# Patient Record
Sex: FEMALE | Race: ASIAN | NOT HISPANIC OR LATINO | Employment: OTHER | URBAN - METROPOLITAN AREA
[De-identification: names, ages, dates, MRNs, and addresses within clinical notes are randomized per-mention and may not be internally consistent; named-entity substitution may affect disease eponyms.]

---

## 2017-07-21 ENCOUNTER — CONVERSION ENCOUNTER (OUTPATIENT)
Dept: RADIOLOGY | Facility: IMAGING CENTER | Age: 77
End: 2017-07-21

## 2017-11-01 ENCOUNTER — LAB REQUISITION (OUTPATIENT)
Dept: LAB | Facility: HOSPITAL | Age: 77
End: 2017-11-01
Payer: MEDICARE

## 2017-11-01 DIAGNOSIS — K21.9 GASTRO-ESOPHAGEAL REFLUX DISEASE WITHOUT ESOPHAGITIS: ICD-10-CM

## 2017-11-01 DIAGNOSIS — R10.13 EPIGASTRIC PAIN: ICD-10-CM

## 2017-11-01 PROCEDURE — 88342 IMHCHEM/IMCYTCHM 1ST ANTB: CPT | Performed by: INTERNAL MEDICINE

## 2017-11-01 PROCEDURE — 88305 TISSUE EXAM BY PATHOLOGIST: CPT | Performed by: INTERNAL MEDICINE

## 2018-07-11 ENCOUNTER — TRANSCRIBE ORDERS (OUTPATIENT)
Dept: ADMINISTRATIVE | Facility: HOSPITAL | Age: 78
End: 2018-07-11

## 2018-07-11 ENCOUNTER — LAB (OUTPATIENT)
Dept: LAB | Facility: IMAGING CENTER | Age: 78
End: 2018-07-11
Payer: MEDICARE

## 2018-07-11 DIAGNOSIS — E03.8 OTHER SPECIFIED HYPOTHYROIDISM: ICD-10-CM

## 2018-07-11 DIAGNOSIS — E03.8 OTHER SPECIFIED HYPOTHYROIDISM: Primary | ICD-10-CM

## 2018-07-11 LAB
T3FREE SERPL-MCNC: 2.07 PG/ML (ref 2.3–4.2)
T4 FREE SERPL-MCNC: 1.26 NG/DL (ref 0.76–1.46)
TSH SERPL DL<=0.05 MIU/L-ACNC: 1.06 UIU/ML (ref 0.36–3.74)

## 2018-07-11 PROCEDURE — 84439 ASSAY OF FREE THYROXINE: CPT

## 2018-07-11 PROCEDURE — 84481 FREE ASSAY (FT-3): CPT

## 2018-07-11 PROCEDURE — 84443 ASSAY THYROID STIM HORMONE: CPT

## 2018-07-11 PROCEDURE — 36415 COLL VENOUS BLD VENIPUNCTURE: CPT

## 2018-07-23 ENCOUNTER — TRANSCRIBE ORDERS (OUTPATIENT)
Dept: ADMINISTRATIVE | Facility: HOSPITAL | Age: 78
End: 2018-07-23

## 2018-07-23 ENCOUNTER — TELEPHONE (OUTPATIENT)
Dept: FAMILY MEDICINE CLINIC | Facility: CLINIC | Age: 78
End: 2018-07-23

## 2018-07-23 DIAGNOSIS — Z12.31 SCREENING MAMMOGRAM, ENCOUNTER FOR: Primary | ICD-10-CM

## 2018-07-23 DIAGNOSIS — Z12.39 SCREENING BREAST EXAMINATION: Primary | ICD-10-CM

## 2018-07-23 NOTE — TELEPHONE ENCOUNTER
Patient's  called and asked for a script for a mammo for his wife  She has an appt on 7/24/2018 @ 10 am at [facility] in [place]  The patient's  would like it faxed over to [phone]

## 2018-07-24 ENCOUNTER — HOSPITAL ENCOUNTER (OUTPATIENT)
Dept: RADIOLOGY | Facility: IMAGING CENTER | Age: 78
Discharge: HOME | End: 2018-07-24
Payer: MEDICARE

## 2018-07-24 DIAGNOSIS — Z12.39 SCREENING BREAST EXAMINATION: ICD-10-CM

## 2018-07-24 PROCEDURE — 77067 SCR MAMMO BI INCL CAD: CPT

## 2018-08-10 ENCOUNTER — TRANSCRIBE ORDERS (OUTPATIENT)
Dept: ADMINISTRATIVE | Facility: HOSPITAL | Age: 78
End: 2018-08-10

## 2018-08-10 ENCOUNTER — LAB (OUTPATIENT)
Dept: LAB | Facility: IMAGING CENTER | Age: 78
End: 2018-08-10
Payer: MEDICARE

## 2018-08-10 DIAGNOSIS — R05.9 COUGH: ICD-10-CM

## 2018-08-10 DIAGNOSIS — R68.89 OTHER GENERAL SYMPTOMS AND SIGNS: ICD-10-CM

## 2018-08-10 DIAGNOSIS — E03.8 OTHER SPECIFIED HYPOTHYROIDISM: ICD-10-CM

## 2018-08-10 DIAGNOSIS — K21.9 GASTROESOPHAGEAL REFLUX DISEASE WITHOUT ESOPHAGITIS: ICD-10-CM

## 2018-08-10 DIAGNOSIS — D32.9 BENIGN NEOPLASM OF MENINGES, UNSPECIFIED (HCC): Primary | ICD-10-CM

## 2018-08-10 DIAGNOSIS — I10 ESSENTIAL (PRIMARY) HYPERTENSION: ICD-10-CM

## 2018-08-10 DIAGNOSIS — D32.9 BENIGN NEOPLASM OF MENINGES, UNSPECIFIED (HCC): ICD-10-CM

## 2018-08-10 LAB
ALBUMIN SERPL BCP-MCNC: 3.6 G/DL (ref 3.5–5)
ALP SERPL-CCNC: 52 U/L (ref 46–116)
ALT SERPL W P-5'-P-CCNC: 33 U/L (ref 12–78)
ANION GAP SERPL CALCULATED.3IONS-SCNC: 6 MMOL/L (ref 4–13)
AST SERPL W P-5'-P-CCNC: 22 U/L (ref 5–45)
BACTERIA UR QL AUTO: ABNORMAL /HPF
BASOPHILS # BLD AUTO: 0.07 THOUSANDS/ΜL (ref 0–0.1)
BASOPHILS NFR BLD AUTO: 1 % (ref 0–1)
BILIRUB SERPL-MCNC: 0.62 MG/DL (ref 0.2–1)
BILIRUB UR QL STRIP: NEGATIVE
BUN SERPL-MCNC: 17 MG/DL (ref 5–25)
CALCIUM SERPL-MCNC: 8.7 MG/DL (ref 8.3–10.1)
CHLORIDE SERPL-SCNC: 106 MMOL/L (ref 100–108)
CHOLEST SERPL-MCNC: 196 MG/DL (ref 50–200)
CLARITY UR: ABNORMAL
CO2 SERPL-SCNC: 24 MMOL/L (ref 21–32)
COLOR UR: YELLOW
CREAT SERPL-MCNC: 0.65 MG/DL (ref 0.6–1.3)
EOSINOPHIL # BLD AUTO: 0.1 THOUSAND/ΜL (ref 0–0.61)
EOSINOPHIL NFR BLD AUTO: 2 % (ref 0–6)
ERYTHROCYTE [DISTWIDTH] IN BLOOD BY AUTOMATED COUNT: 14.4 % (ref 11.6–15.1)
ERYTHROCYTE [SEDIMENTATION RATE] IN BLOOD: 18 MM/HOUR (ref 0–20)
GFR SERPL CREATININE-BSD FRML MDRD: 87 ML/MIN/1.73SQ M
GLUCOSE P FAST SERPL-MCNC: 88 MG/DL (ref 65–99)
GLUCOSE UR STRIP-MCNC: NEGATIVE MG/DL
HCT VFR BLD AUTO: 39.9 % (ref 34.8–46.1)
HDLC SERPL-MCNC: 56 MG/DL (ref 40–60)
HGB BLD-MCNC: 12.4 G/DL (ref 11.5–15.4)
HGB UR QL STRIP.AUTO: NEGATIVE
HYALINE CASTS #/AREA URNS LPF: ABNORMAL /LPF
IMM GRANULOCYTES # BLD AUTO: 0.01 THOUSAND/UL (ref 0–0.2)
IMM GRANULOCYTES NFR BLD AUTO: 0 % (ref 0–2)
KETONES UR STRIP-MCNC: NEGATIVE MG/DL
LDLC SERPL CALC-MCNC: 114 MG/DL (ref 0–100)
LEUKOCYTE ESTERASE UR QL STRIP: NEGATIVE
LYMPHOCYTES # BLD AUTO: 2.37 THOUSANDS/ΜL (ref 0.6–4.47)
LYMPHOCYTES NFR BLD AUTO: 40 % (ref 14–44)
MAGNESIUM SERPL-MCNC: 2.4 MG/DL (ref 1.6–2.6)
MCH RBC QN AUTO: 28.4 PG (ref 26.8–34.3)
MCHC RBC AUTO-ENTMCNC: 31.1 G/DL (ref 31.4–37.4)
MCV RBC AUTO: 92 FL (ref 82–98)
MONOCYTES # BLD AUTO: 0.55 THOUSAND/ΜL (ref 0.17–1.22)
MONOCYTES NFR BLD AUTO: 9 % (ref 4–12)
NEUTROPHILS # BLD AUTO: 2.88 THOUSANDS/ΜL (ref 1.85–7.62)
NEUTS SEG NFR BLD AUTO: 48 % (ref 43–75)
NITRITE UR QL STRIP: NEGATIVE
NON-SQ EPI CELLS URNS QL MICRO: ABNORMAL /HPF
NONHDLC SERPL-MCNC: 140 MG/DL
NRBC BLD AUTO-RTO: 0 /100 WBCS
PH UR STRIP.AUTO: 6 [PH] (ref 4.5–8)
PLATELET # BLD AUTO: 271 THOUSANDS/UL (ref 149–390)
PMV BLD AUTO: 10.4 FL (ref 8.9–12.7)
POTASSIUM SERPL-SCNC: 3.7 MMOL/L (ref 3.5–5.3)
PROT SERPL-MCNC: 7.5 G/DL (ref 6.4–8.2)
PROT UR STRIP-MCNC: NEGATIVE MG/DL
RBC # BLD AUTO: 4.36 MILLION/UL (ref 3.81–5.12)
RBC #/AREA URNS AUTO: ABNORMAL /HPF
SODIUM SERPL-SCNC: 136 MMOL/L (ref 136–145)
SP GR UR STRIP.AUTO: 1.01 (ref 1–1.03)
T3FREE SERPL-MCNC: 2.41 PG/ML (ref 2.3–4.2)
T4 FREE SERPL-MCNC: 1.37 NG/DL (ref 0.76–1.46)
TRIGL SERPL-MCNC: 131 MG/DL
TSH SERPL DL<=0.05 MIU/L-ACNC: 1.7 UIU/ML (ref 0.36–3.74)
UROBILINOGEN UR QL STRIP.AUTO: 0.2 E.U./DL
WBC # BLD AUTO: 5.98 THOUSAND/UL (ref 4.31–10.16)
WBC #/AREA URNS AUTO: ABNORMAL /HPF

## 2018-08-10 PROCEDURE — 83735 ASSAY OF MAGNESIUM: CPT

## 2018-08-10 PROCEDURE — 85652 RBC SED RATE AUTOMATED: CPT

## 2018-08-10 PROCEDURE — 84439 ASSAY OF FREE THYROXINE: CPT

## 2018-08-10 PROCEDURE — 84443 ASSAY THYROID STIM HORMONE: CPT

## 2018-08-10 PROCEDURE — 36415 COLL VENOUS BLD VENIPUNCTURE: CPT

## 2018-08-10 PROCEDURE — 80061 LIPID PANEL: CPT

## 2018-08-10 PROCEDURE — 80053 COMPREHEN METABOLIC PANEL: CPT

## 2018-08-10 PROCEDURE — 81001 URINALYSIS AUTO W/SCOPE: CPT | Performed by: SPECIALIST

## 2018-08-10 PROCEDURE — 85025 COMPLETE CBC W/AUTO DIFF WBC: CPT

## 2018-08-10 PROCEDURE — 84481 FREE ASSAY (FT-3): CPT

## 2018-08-15 DIAGNOSIS — I10 ESSENTIAL HYPERTENSION: Primary | ICD-10-CM

## 2018-08-15 RX ORDER — LEVOTHYROXINE SODIUM 50 MCG
TABLET ORAL
Qty: 90 TABLET | OUTPATIENT
Start: 2018-08-15

## 2018-08-15 RX ORDER — AMLODIPINE BESYLATE 5 MG/1
TABLET ORAL
Qty: 180 TABLET | Refills: 4 | Status: SHIPPED | OUTPATIENT
Start: 2018-08-15 | End: 2018-09-18 | Stop reason: SDUPTHER

## 2018-09-18 ENCOUNTER — OFFICE VISIT (OUTPATIENT)
Dept: FAMILY MEDICINE CLINIC | Facility: CLINIC | Age: 78
End: 2018-09-18
Payer: MEDICARE

## 2018-09-18 VITALS
BODY MASS INDEX: 24.24 KG/M2 | DIASTOLIC BLOOD PRESSURE: 62 MMHG | SYSTOLIC BLOOD PRESSURE: 118 MMHG | OXYGEN SATURATION: 97 % | HEIGHT: 64 IN | HEART RATE: 57 BPM | RESPIRATION RATE: 16 BRPM | WEIGHT: 142 LBS

## 2018-09-18 DIAGNOSIS — I10 ESSENTIAL HYPERTENSION: ICD-10-CM

## 2018-09-18 DIAGNOSIS — E03.9 HYPOTHYROIDISM, UNSPECIFIED TYPE: Primary | ICD-10-CM

## 2018-09-18 PROCEDURE — 99214 OFFICE O/P EST MOD 30 MIN: CPT | Performed by: SPECIALIST

## 2018-09-18 RX ORDER — LEVOTHYROXINE SODIUM 0.07 MG/1
TABLET ORAL EVERY 24 HOURS
COMMUNITY
Start: 2018-04-16 | End: 2018-09-18

## 2018-09-18 RX ORDER — LEVOTHYROXINE SODIUM 0.05 MG/1
TABLET ORAL
Qty: 104 TABLET | Refills: 3 | Status: SHIPPED | OUTPATIENT
Start: 2018-09-18 | End: 2018-09-18 | Stop reason: SDUPTHER

## 2018-09-18 RX ORDER — LEVOTHYROXINE SODIUM 0.05 MG/1
TABLET ORAL EVERY 24 HOURS
COMMUNITY
Start: 2018-04-16 | End: 2018-09-18 | Stop reason: SDUPTHER

## 2018-09-18 RX ORDER — ESTRADIOL 1 MG/1
TABLET ORAL EVERY 24 HOURS
COMMUNITY
Start: 2014-11-11 | End: 2019-04-08 | Stop reason: ALTCHOICE

## 2018-09-18 RX ORDER — ZOLPIDEM TARTRATE 5 MG/1
5 TABLET ORAL DAILY
COMMUNITY
Start: 2014-12-01 | End: 2019-04-15 | Stop reason: SDUPTHER

## 2018-09-18 RX ORDER — AMLODIPINE BESYLATE 5 MG/1
5 TABLET ORAL 2 TIMES DAILY
Qty: 180 TABLET | Refills: 3 | Status: SHIPPED | OUTPATIENT
Start: 2018-09-18 | End: 2018-09-18 | Stop reason: SDUPTHER

## 2018-09-18 RX ORDER — METRONIDAZOLE 7.5 MG/G
GEL TOPICAL EVERY 12 HOURS
COMMUNITY
Start: 2017-11-06 | End: 2019-04-10 | Stop reason: SDUPTHER

## 2018-09-18 RX ORDER — LEVOTHYROXINE SODIUM 0.05 MG/1
TABLET ORAL
Qty: 104 TABLET | Refills: 0 | Status: SHIPPED | OUTPATIENT
Start: 2018-09-18 | End: 2018-09-26 | Stop reason: SDUPTHER

## 2018-09-18 RX ORDER — AMLODIPINE BESYLATE 5 MG/1
5 TABLET ORAL 2 TIMES DAILY
Qty: 180 TABLET | Refills: 0 | Status: SHIPPED | OUTPATIENT
Start: 2018-09-18 | End: 2018-11-06 | Stop reason: SDUPTHER

## 2018-09-18 NOTE — PROGRESS NOTES
Assessment/Plan:feels  Well  Lab  reviewed         Diagnoses and all orders for this visit:    Hypothyroidism, unspecified type  -     Discontinue: levothyroxine (SYNTHROID) 50 mcg tablet; Take 1 daily and 1 1/2 tablet on Wednesday and Saturday  -     levothyroxine (SYNTHROID) 50 mcg tablet; Take 1 daily and 1 1/2 tablet on Wednesday and Saturday  -     CBC and differential; Future  -     Comprehensive metabolic panel; Future  -     Lipid panel; Future  -     T4, free; Future  -     UA w Reflex to Microscopic w Reflex to Culture -Lab Collect  -     TSH, 3rd generation; Future    Essential hypertension  -     Discontinue: amLODIPine (NORVASC) 5 mg tablet; Take 1 tablet (5 mg total) by mouth 2 (two) times a day  -     amLODIPine (NORVASC) 5 mg tablet; Take 1 tablet (5 mg total) by mouth 2 (two) times a day  -     CBC and differential; Future  -     Comprehensive metabolic panel; Future  -     Lipid panel; Future  -     T4, free; Future  -     UA w Reflex to Microscopic w Reflex to Culture -Lab Collect  -     TSH, 3rd generation; Future    Other orders  -     estradiol (ESTRACE) 1 mg tablet; every 24 hours  -     Discontinue: levothyroxine (SYNTHROID) 50 mcg tablet; every 24 hours  -     Discontinue: levothyroxine (SYNTHROID) 75 mcg tablet; every 24 hours  -     B Complex Vitamins (VITAMIN-B COMPLEX PO); Take 1 tablet by mouth  -     zolpidem (AMBIEN) 5 mg tablet; Take 5 mg by mouth daily  -     metroNIDAZOLE (METROGEL) 0 75 % gel; Every 12 hours          Subjective:      Patient ID: Muna Garza is a 70 y o  female      69 Yo  feels  Well      bp  Controlled   Lab  reviewed        The following portions of the patient's history were reviewed and updated as appropriate: allergies, current medications, past family history, past medical history, past social history, past surgical history and problem list     Review of Systems   Constitutional: Negative for activity change, appetite change, chills, diaphoresis, fatigue, fever and unexpected weight change  HENT: Negative for hearing loss, sinus pain, sinus pressure and voice change  Respiratory: Negative for apnea, cough, choking, chest tightness, shortness of breath and wheezing  Cardiovascular: Negative for chest pain, palpitations and leg swelling  Gastrointestinal: Negative for abdominal distention and abdominal pain  Genitourinary: Negative for dysuria  Musculoskeletal: Negative for arthralgias, back pain, gait problem, joint swelling, myalgias and neck pain  Neurological: Negative for dizziness, tremors, seizures, syncope, facial asymmetry, speech difficulty, weakness, light-headedness, numbness and headaches  Psychiatric/Behavioral: Negative for agitation and behavioral problems  Objective:      /62   Pulse 57   Resp 16   Ht 5' 4" (1 626 m)   Wt 64 4 kg (142 lb)   SpO2 97%   BMI 24 37 kg/m²          Physical Exam   Constitutional: She appears well-developed and well-nourished  No distress  HENT:   Head: Normocephalic  Right Ear: External ear normal    Left Ear: External ear normal    Eyes: Conjunctivae and EOM are normal  Pupils are equal, round, and reactive to light  Right eye exhibits no discharge  Left eye exhibits no discharge  Scleral icterus is present  Cardiovascular: Normal rate, regular rhythm and intact distal pulses  Exam reveals no gallop and no friction rub  No murmur heard  Pulmonary/Chest: No respiratory distress  She has no wheezes  She has no rales  She exhibits no tenderness  Abdominal: She exhibits no distension  There is no tenderness  There is no rebound  Musculoskeletal: She exhibits no edema, tenderness or deformity  Neurological: She is alert  No cranial nerve deficit  Skin: Skin is warm and dry  No rash noted  She is not diaphoretic  No erythema     Psychiatric: Her behavior is normal

## 2018-09-26 DIAGNOSIS — E03.9 HYPOTHYROIDISM, UNSPECIFIED TYPE: ICD-10-CM

## 2018-09-26 RX ORDER — LEVOTHYROXINE SODIUM 0.05 MG/1
TABLET ORAL
Qty: 135 TABLET | Refills: 5 | Status: SHIPPED | OUTPATIENT
Start: 2018-09-26 | End: 2018-11-06 | Stop reason: SDUPTHER

## 2018-09-27 ENCOUNTER — TELEPHONE (OUTPATIENT)
Dept: FAMILY MEDICINE CLINIC | Facility: CLINIC | Age: 78
End: 2018-09-27

## 2018-09-28 ENCOUNTER — TELEPHONE (OUTPATIENT)
Dept: FAMILY MEDICINE CLINIC | Facility: CLINIC | Age: 78
End: 2018-09-28

## 2018-11-06 ENCOUNTER — APPOINTMENT (OUTPATIENT)
Dept: LAB | Facility: HOSPITAL | Age: 78
End: 2018-11-06
Payer: MEDICARE

## 2018-11-06 ENCOUNTER — OFFICE VISIT (OUTPATIENT)
Dept: FAMILY MEDICINE CLINIC | Facility: CLINIC | Age: 78
End: 2018-11-06
Payer: MEDICARE

## 2018-11-06 VITALS
RESPIRATION RATE: 16 BRPM | DIASTOLIC BLOOD PRESSURE: 60 MMHG | OXYGEN SATURATION: 94 % | WEIGHT: 142 LBS | SYSTOLIC BLOOD PRESSURE: 114 MMHG | BODY MASS INDEX: 24.37 KG/M2 | HEART RATE: 67 BPM

## 2018-11-06 DIAGNOSIS — E03.9 ACQUIRED HYPOTHYROIDISM: ICD-10-CM

## 2018-11-06 DIAGNOSIS — I10 ESSENTIAL HYPERTENSION: ICD-10-CM

## 2018-11-06 DIAGNOSIS — E03.9 HYPOTHYROIDISM, UNSPECIFIED TYPE: ICD-10-CM

## 2018-11-06 DIAGNOSIS — E03.9 ACQUIRED HYPOTHYROIDISM: Primary | ICD-10-CM

## 2018-11-06 LAB
T3FREE SERPL-MCNC: 2.02 PG/ML (ref 2.3–4.2)
T4 FREE SERPL-MCNC: 1.22 NG/DL (ref 0.76–1.46)
TSH SERPL DL<=0.05 MIU/L-ACNC: 5.53 UIU/ML (ref 0.47–4.68)

## 2018-11-06 PROCEDURE — 84439 ASSAY OF FREE THYROXINE: CPT

## 2018-11-06 PROCEDURE — 84443 ASSAY THYROID STIM HORMONE: CPT

## 2018-11-06 PROCEDURE — 99212 OFFICE O/P EST SF 10 MIN: CPT | Performed by: SPECIALIST

## 2018-11-06 PROCEDURE — 84481 FREE ASSAY (FT-3): CPT

## 2018-11-06 PROCEDURE — 36415 COLL VENOUS BLD VENIPUNCTURE: CPT

## 2018-11-06 RX ORDER — LEVOTHYROXINE SODIUM 50 MCG
TABLET ORAL
Qty: 103 TABLET | Refills: 3 | Status: SHIPPED | OUTPATIENT
Start: 2018-11-06 | End: 2019-04-10 | Stop reason: SDUPTHER

## 2018-11-06 RX ORDER — AMLODIPINE BESYLATE 5 MG/1
TABLET ORAL
Qty: 180 TABLET | Refills: 3 | Status: SHIPPED | OUTPATIENT
Start: 2018-11-06 | End: 2019-04-10 | Stop reason: SDUPTHER

## 2018-11-06 NOTE — PROGRESS NOTES
Assessment/Plan:      Feels  That  He  She  Is  Hypothyroid   Dry  Skin  Constipation  Feels  Cold          Diagnoses and all orders for this visit:    Acquired hypothyroidism  -     TSH, 3rd generation; Future  -     T4, free; Future  -     T3, free; Future          Subjective:      Patient ID: Rhonda Dupree is a 70 y o  female  69 Yo  Feel  Tired  Constipated   Dry  Skin    Feels  Cold     Check  Thyroid   parameters        The following portions of the patient's history were reviewed and updated as appropriate: allergies, current medications, past family history, past medical history, past social history, past surgical history and problem list     Review of Systems   Constitutional: Positive for fatigue  Negative for activity change, appetite change, chills, diaphoresis, fever and unexpected weight change  HENT: Negative for ear pain and voice change  Eyes: Negative for visual disturbance  Respiratory: Negative for cough, chest tightness, shortness of breath and wheezing  Cardiovascular: Negative for chest pain, palpitations and leg swelling  Gastrointestinal: Positive for constipation  Negative for abdominal distention and abdominal pain  Genitourinary: Negative for difficulty urinating  Musculoskeletal: Negative for arthralgias, back pain, gait problem, joint swelling and myalgias  Skin: Negative for color change, pallor, rash and wound  Dry  skin   Neurological: Negative for dizziness, tremors, seizures, syncope, facial asymmetry, speech difficulty, weakness, light-headedness, numbness and headaches  Objective:      /60 (BP Location: Left arm, Patient Position: Sitting, Cuff Size: Adult)   Pulse 67   Resp 16   Wt 64 4 kg (142 lb)   SpO2 94%   BMI 24 37 kg/m²          Physical Exam   Constitutional: She appears well-developed and well-nourished  No distress  Eyes: Pupils are equal, round, and reactive to light   Conjunctivae and EOM are normal    Neck: No JVD present  Cardiovascular: Normal rate and regular rhythm  No murmur heard  Pulmonary/Chest: She has no wheezes  She has no rales  Musculoskeletal: She exhibits no edema  Skin: Skin is dry  She is not diaphoretic

## 2018-11-07 ENCOUNTER — TELEPHONE (OUTPATIENT)
Dept: FAMILY MEDICINE CLINIC | Facility: CLINIC | Age: 78
End: 2018-11-07

## 2018-11-07 NOTE — TELEPHONE ENCOUNTER
Tess Martin said he had a missed call  Patient was seen 11/6/2018 in office and was sent for labs  Please advise

## 2018-11-08 NOTE — TELEPHONE ENCOUNTER
I did not call this Pt   I will forward this message to Lauryn Wheeler to see if she contacted this Pt

## 2019-01-17 ENCOUNTER — APPOINTMENT (OUTPATIENT)
Dept: LAB | Facility: CLINIC | Age: 79
End: 2019-01-17
Payer: MEDICARE

## 2019-01-17 LAB
T4 FREE SERPL-MCNC: 1.33 NG/DL (ref 0.76–1.46)
TSH SERPL DL<=0.05 MIU/L-ACNC: 0.53 UIU/ML (ref 0.36–3.74)

## 2019-01-17 PROCEDURE — 84439 ASSAY OF FREE THYROXINE: CPT

## 2019-01-17 PROCEDURE — 84443 ASSAY THYROID STIM HORMONE: CPT

## 2019-01-17 PROCEDURE — 36415 COLL VENOUS BLD VENIPUNCTURE: CPT

## 2019-01-22 ENCOUNTER — TELEPHONE (OUTPATIENT)
Dept: FAMILY MEDICINE CLINIC | Facility: CLINIC | Age: 79
End: 2019-01-22

## 2019-01-22 NOTE — TELEPHONE ENCOUNTER
Pts  called and states T3 was not done with last blood work   He would like you to place order so he can take her today to get this done

## 2019-03-28 ENCOUNTER — TRANSCRIBE ORDERS (OUTPATIENT)
Dept: LAB | Facility: CLINIC | Age: 79
End: 2019-03-28

## 2019-03-28 ENCOUNTER — APPOINTMENT (OUTPATIENT)
Dept: LAB | Facility: CLINIC | Age: 79
End: 2019-03-28
Payer: MEDICARE

## 2019-03-28 DIAGNOSIS — I10 HYPERTENSION, UNSPECIFIED TYPE: ICD-10-CM

## 2019-03-28 DIAGNOSIS — E03.9 HYPOTHYROIDISM (ACQUIRED): ICD-10-CM

## 2019-03-28 DIAGNOSIS — E03.9 HYPOTHYROIDISM (ACQUIRED): Primary | ICD-10-CM

## 2019-03-28 LAB
ALBUMIN SERPL BCP-MCNC: 3.5 G/DL (ref 3.5–5)
ALP SERPL-CCNC: 55 U/L (ref 46–116)
ALT SERPL W P-5'-P-CCNC: 38 U/L (ref 12–78)
ANION GAP SERPL CALCULATED.3IONS-SCNC: 10 MMOL/L (ref 4–13)
AST SERPL W P-5'-P-CCNC: 23 U/L (ref 5–45)
BACTERIA UR QL AUTO: ABNORMAL /HPF
BASOPHILS # BLD AUTO: 0.06 THOUSANDS/ΜL (ref 0–0.1)
BASOPHILS NFR BLD AUTO: 1 % (ref 0–1)
BILIRUB SERPL-MCNC: 0.2 MG/DL (ref 0.2–1)
BILIRUB UR QL STRIP: NEGATIVE
BUN SERPL-MCNC: 18 MG/DL (ref 5–25)
CALCIUM SERPL-MCNC: 8.8 MG/DL (ref 8.3–10.1)
CHLORIDE SERPL-SCNC: 105 MMOL/L (ref 100–108)
CHOLEST SERPL-MCNC: 198 MG/DL (ref 50–200)
CLARITY UR: CLEAR
CO2 SERPL-SCNC: 24 MMOL/L (ref 21–32)
COLOR UR: YELLOW
CREAT SERPL-MCNC: 0.68 MG/DL (ref 0.6–1.3)
EOSINOPHIL # BLD AUTO: 0.12 THOUSAND/ΜL (ref 0–0.61)
EOSINOPHIL NFR BLD AUTO: 2 % (ref 0–6)
ERYTHROCYTE [DISTWIDTH] IN BLOOD BY AUTOMATED COUNT: 14.1 % (ref 11.6–15.1)
GFR SERPL CREATININE-BSD FRML MDRD: 85 ML/MIN/1.73SQ M
GLUCOSE P FAST SERPL-MCNC: 98 MG/DL (ref 65–99)
GLUCOSE UR STRIP-MCNC: NEGATIVE MG/DL
HCT VFR BLD AUTO: 38.9 % (ref 34.8–46.1)
HDLC SERPL-MCNC: 56 MG/DL (ref 40–60)
HGB BLD-MCNC: 12.7 G/DL (ref 11.5–15.4)
HGB UR QL STRIP.AUTO: NEGATIVE
IMM GRANULOCYTES # BLD AUTO: 0.01 THOUSAND/UL (ref 0–0.2)
IMM GRANULOCYTES NFR BLD AUTO: 0 % (ref 0–2)
KETONES UR STRIP-MCNC: NEGATIVE MG/DL
LDLC SERPL CALC-MCNC: 127 MG/DL (ref 0–100)
LEUKOCYTE ESTERASE UR QL STRIP: ABNORMAL
LYMPHOCYTES # BLD AUTO: 2.77 THOUSANDS/ΜL (ref 0.6–4.47)
LYMPHOCYTES NFR BLD AUTO: 48 % (ref 14–44)
MCH RBC QN AUTO: 29.3 PG (ref 26.8–34.3)
MCHC RBC AUTO-ENTMCNC: 32.6 G/DL (ref 31.4–37.4)
MCV RBC AUTO: 90 FL (ref 82–98)
MONOCYTES # BLD AUTO: 0.48 THOUSAND/ΜL (ref 0.17–1.22)
MONOCYTES NFR BLD AUTO: 8 % (ref 4–12)
NEUTROPHILS # BLD AUTO: 2.4 THOUSANDS/ΜL (ref 1.85–7.62)
NEUTS SEG NFR BLD AUTO: 41 % (ref 43–75)
NITRITE UR QL STRIP: NEGATIVE
NON-SQ EPI CELLS URNS QL MICRO: ABNORMAL /HPF
NONHDLC SERPL-MCNC: 142 MG/DL
NRBC BLD AUTO-RTO: 0 /100 WBCS
PH UR STRIP.AUTO: 7 [PH]
PLATELET # BLD AUTO: 239 THOUSANDS/UL (ref 149–390)
PMV BLD AUTO: 10.2 FL (ref 8.9–12.7)
POTASSIUM SERPL-SCNC: 3.7 MMOL/L (ref 3.5–5.3)
PROT SERPL-MCNC: 7.3 G/DL (ref 6.4–8.2)
PROT UR STRIP-MCNC: NEGATIVE MG/DL
RBC # BLD AUTO: 4.34 MILLION/UL (ref 3.81–5.12)
RBC #/AREA URNS AUTO: ABNORMAL /HPF
SODIUM SERPL-SCNC: 139 MMOL/L (ref 136–145)
SP GR UR STRIP.AUTO: 1.01 (ref 1–1.03)
T4 FREE SERPL-MCNC: 1.2 NG/DL (ref 0.76–1.46)
TRIGL SERPL-MCNC: 76 MG/DL
TSH SERPL DL<=0.05 MIU/L-ACNC: 1.27 UIU/ML (ref 0.36–3.74)
UROBILINOGEN UR QL STRIP.AUTO: 0.2 E.U./DL
WBC # BLD AUTO: 5.84 THOUSAND/UL (ref 4.31–10.16)
WBC #/AREA URNS AUTO: ABNORMAL /HPF

## 2019-03-28 PROCEDURE — 36415 COLL VENOUS BLD VENIPUNCTURE: CPT

## 2019-03-28 PROCEDURE — 84443 ASSAY THYROID STIM HORMONE: CPT

## 2019-03-28 PROCEDURE — 80053 COMPREHEN METABOLIC PANEL: CPT

## 2019-03-28 PROCEDURE — 85025 COMPLETE CBC W/AUTO DIFF WBC: CPT

## 2019-03-28 PROCEDURE — 80061 LIPID PANEL: CPT

## 2019-03-28 PROCEDURE — 81001 URINALYSIS AUTO W/SCOPE: CPT | Performed by: SPECIALIST

## 2019-03-28 PROCEDURE — 84439 ASSAY OF FREE THYROXINE: CPT

## 2019-04-08 ENCOUNTER — OFFICE VISIT (OUTPATIENT)
Dept: FAMILY MEDICINE CLINIC | Facility: CLINIC | Age: 79
End: 2019-04-08
Payer: MEDICARE

## 2019-04-08 VITALS
DIASTOLIC BLOOD PRESSURE: 78 MMHG | SYSTOLIC BLOOD PRESSURE: 142 MMHG | HEART RATE: 64 BPM | RESPIRATION RATE: 14 BRPM | OXYGEN SATURATION: 95 % | HEIGHT: 64 IN | TEMPERATURE: 97 F | WEIGHT: 141 LBS | BODY MASS INDEX: 24.07 KG/M2

## 2019-04-08 DIAGNOSIS — E03.9 HYPOTHYROIDISM, UNSPECIFIED TYPE: ICD-10-CM

## 2019-04-08 DIAGNOSIS — G47.00 INSOMNIA, UNSPECIFIED TYPE: ICD-10-CM

## 2019-04-08 DIAGNOSIS — I10 ESSENTIAL HYPERTENSION: Primary | ICD-10-CM

## 2019-04-08 DIAGNOSIS — E03.9 ACQUIRED HYPOTHYROIDISM: ICD-10-CM

## 2019-04-08 DIAGNOSIS — D32.9 BENIGN NEOPLASM OF MENINGES, UNSPECIFIED (HCC): ICD-10-CM

## 2019-04-08 PROCEDURE — 99214 OFFICE O/P EST MOD 30 MIN: CPT | Performed by: SPECIALIST

## 2019-04-10 RX ORDER — LEVOTHYROXINE SODIUM 50 MCG
50 TABLET ORAL DAILY
Qty: 103 TABLET | Refills: 3 | Status: SHIPPED | OUTPATIENT
Start: 2019-04-10 | End: 2019-08-12

## 2019-04-10 RX ORDER — METRONIDAZOLE 7.5 MG/G
GEL TOPICAL 2 TIMES DAILY
Qty: 45 G | Refills: 1 | Status: SHIPPED | OUTPATIENT
Start: 2019-04-10 | End: 2020-02-10 | Stop reason: SDUPTHER

## 2019-04-10 RX ORDER — AMLODIPINE BESYLATE 5 MG/1
5 TABLET ORAL 2 TIMES DAILY
Qty: 180 TABLET | Refills: 3 | Status: SHIPPED | OUTPATIENT
Start: 2019-04-10 | End: 2020-02-10 | Stop reason: SDUPTHER

## 2019-04-15 DIAGNOSIS — G47.00 INSOMNIA, UNSPECIFIED TYPE: Primary | ICD-10-CM

## 2019-04-15 RX ORDER — ZOLPIDEM TARTRATE 5 MG/1
5 TABLET ORAL DAILY
Qty: 30 TABLET | Refills: 1 | Status: CANCELLED | OUTPATIENT
Start: 2019-04-15

## 2019-04-15 RX ORDER — ZOLPIDEM TARTRATE 5 MG/1
5 TABLET ORAL DAILY
Qty: 30 TABLET | Refills: 2 | Status: SHIPPED | OUTPATIENT
Start: 2019-04-15 | End: 2020-02-10 | Stop reason: SDUPTHER

## 2019-05-22 ENCOUNTER — TRANSCRIBE ORDERS (OUTPATIENT)
Dept: ADMINISTRATIVE | Facility: HOSPITAL | Age: 79
End: 2019-05-22

## 2019-05-22 DIAGNOSIS — D32.9 MENINGIOMA (HCC): Primary | ICD-10-CM

## 2019-05-30 ENCOUNTER — HOSPITAL ENCOUNTER (OUTPATIENT)
Dept: MRI IMAGING | Facility: HOSPITAL | Age: 79
Discharge: HOME | End: 2019-05-30
Payer: MEDICARE

## 2019-05-30 DIAGNOSIS — D32.9 MENINGIOMA (HCC): ICD-10-CM

## 2019-05-30 PROCEDURE — A9585 GADOBUTROL INJECTION: HCPCS | Performed by: RADIOLOGY

## 2019-05-30 PROCEDURE — 70553 MRI BRAIN STEM W/O & W/DYE: CPT

## 2019-05-30 RX ADMIN — GADOBUTROL 6 ML: 604.72 INJECTION INTRAVENOUS at 14:30

## 2019-07-29 ENCOUNTER — TELEPHONE (OUTPATIENT)
Dept: FAMILY MEDICINE CLINIC | Facility: CLINIC | Age: 79
End: 2019-07-29

## 2019-07-29 DIAGNOSIS — E03.9 ACQUIRED HYPOTHYROIDISM: ICD-10-CM

## 2019-07-29 DIAGNOSIS — D32.9 BENIGN NEOPLASM OF MENINGES, UNSPECIFIED (HCC): ICD-10-CM

## 2019-07-29 DIAGNOSIS — I10 ESSENTIAL HYPERTENSION: Primary | ICD-10-CM

## 2019-08-05 ENCOUNTER — APPOINTMENT (OUTPATIENT)
Dept: LAB | Facility: CLINIC | Age: 79
End: 2019-08-05
Payer: MEDICARE

## 2019-08-05 DIAGNOSIS — I10 ESSENTIAL HYPERTENSION: ICD-10-CM

## 2019-08-05 DIAGNOSIS — D32.9 BENIGN NEOPLASM OF MENINGES, UNSPECIFIED (HCC): ICD-10-CM

## 2019-08-05 DIAGNOSIS — E03.9 ACQUIRED HYPOTHYROIDISM: ICD-10-CM

## 2019-08-05 LAB
ALBUMIN SERPL BCP-MCNC: 3.5 G/DL (ref 3.5–5)
ALP SERPL-CCNC: 52 U/L (ref 46–116)
ALT SERPL W P-5'-P-CCNC: 26 U/L (ref 12–78)
ANION GAP SERPL CALCULATED.3IONS-SCNC: 10 MMOL/L (ref 4–13)
AST SERPL W P-5'-P-CCNC: 16 U/L (ref 5–45)
BACTERIA UR QL AUTO: ABNORMAL /HPF
BASOPHILS # BLD AUTO: 0.07 THOUSANDS/ΜL (ref 0–0.1)
BASOPHILS NFR BLD AUTO: 1 % (ref 0–1)
BILIRUB SERPL-MCNC: 0.4 MG/DL (ref 0.2–1)
BILIRUB UR QL STRIP: NEGATIVE
BUN SERPL-MCNC: 16 MG/DL (ref 5–25)
CALCIUM SERPL-MCNC: 8.8 MG/DL (ref 8.3–10.1)
CHLORIDE SERPL-SCNC: 106 MMOL/L (ref 100–108)
CHOLEST SERPL-MCNC: 185 MG/DL (ref 50–200)
CLARITY UR: CLEAR
CO2 SERPL-SCNC: 25 MMOL/L (ref 21–32)
COLOR UR: YELLOW
CREAT SERPL-MCNC: 0.7 MG/DL (ref 0.6–1.3)
EOSINOPHIL # BLD AUTO: 0.09 THOUSAND/ΜL (ref 0–0.61)
EOSINOPHIL NFR BLD AUTO: 2 % (ref 0–6)
ERYTHROCYTE [DISTWIDTH] IN BLOOD BY AUTOMATED COUNT: 13.8 % (ref 11.6–15.1)
GFR SERPL CREATININE-BSD FRML MDRD: 84 ML/MIN/1.73SQ M
GLUCOSE P FAST SERPL-MCNC: 94 MG/DL (ref 65–99)
GLUCOSE UR STRIP-MCNC: NEGATIVE MG/DL
HCT VFR BLD AUTO: 40.3 % (ref 34.8–46.1)
HDLC SERPL-MCNC: 48 MG/DL (ref 40–60)
HGB BLD-MCNC: 12.7 G/DL (ref 11.5–15.4)
HGB UR QL STRIP.AUTO: NEGATIVE
IMM GRANULOCYTES # BLD AUTO: 0.01 THOUSAND/UL (ref 0–0.2)
IMM GRANULOCYTES NFR BLD AUTO: 0 % (ref 0–2)
KETONES UR STRIP-MCNC: NEGATIVE MG/DL
LDLC SERPL CALC-MCNC: 106 MG/DL (ref 0–100)
LEUKOCYTE ESTERASE UR QL STRIP: ABNORMAL
LYMPHOCYTES # BLD AUTO: 2.7 THOUSANDS/ΜL (ref 0.6–4.47)
LYMPHOCYTES NFR BLD AUTO: 49 % (ref 14–44)
MCH RBC QN AUTO: 28.9 PG (ref 26.8–34.3)
MCHC RBC AUTO-ENTMCNC: 31.5 G/DL (ref 31.4–37.4)
MCV RBC AUTO: 92 FL (ref 82–98)
MONOCYTES # BLD AUTO: 0.54 THOUSAND/ΜL (ref 0.17–1.22)
MONOCYTES NFR BLD AUTO: 10 % (ref 4–12)
NEUTROPHILS # BLD AUTO: 2.06 THOUSANDS/ΜL (ref 1.85–7.62)
NEUTS SEG NFR BLD AUTO: 38 % (ref 43–75)
NITRITE UR QL STRIP: NEGATIVE
NON-SQ EPI CELLS URNS QL MICRO: ABNORMAL /HPF
NONHDLC SERPL-MCNC: 137 MG/DL
NRBC BLD AUTO-RTO: 0 /100 WBCS
PH UR STRIP.AUTO: 6.5 [PH]
PLATELET # BLD AUTO: 246 THOUSANDS/UL (ref 149–390)
PMV BLD AUTO: 9.9 FL (ref 8.9–12.7)
POTASSIUM SERPL-SCNC: 3.9 MMOL/L (ref 3.5–5.3)
PROT SERPL-MCNC: 7.4 G/DL (ref 6.4–8.2)
PROT UR STRIP-MCNC: NEGATIVE MG/DL
RBC # BLD AUTO: 4.39 MILLION/UL (ref 3.81–5.12)
RBC #/AREA URNS AUTO: ABNORMAL /HPF
SODIUM SERPL-SCNC: 141 MMOL/L (ref 136–145)
SP GR UR STRIP.AUTO: <=1.005 (ref 1–1.03)
T4 FREE SERPL-MCNC: 1.29 NG/DL (ref 0.76–1.46)
TRIGL SERPL-MCNC: 154 MG/DL
TSH SERPL DL<=0.05 MIU/L-ACNC: 0.56 UIU/ML (ref 0.36–3.74)
UROBILINOGEN UR QL STRIP.AUTO: 0.2 E.U./DL
WBC # BLD AUTO: 5.47 THOUSAND/UL (ref 4.31–10.16)
WBC #/AREA URNS AUTO: ABNORMAL /HPF

## 2019-08-05 PROCEDURE — 84439 ASSAY OF FREE THYROXINE: CPT

## 2019-08-05 PROCEDURE — 80061 LIPID PANEL: CPT

## 2019-08-05 PROCEDURE — 36415 COLL VENOUS BLD VENIPUNCTURE: CPT

## 2019-08-05 PROCEDURE — 85025 COMPLETE CBC W/AUTO DIFF WBC: CPT

## 2019-08-05 PROCEDURE — 84443 ASSAY THYROID STIM HORMONE: CPT

## 2019-08-05 PROCEDURE — 80053 COMPREHEN METABOLIC PANEL: CPT

## 2019-08-05 PROCEDURE — 81001 URINALYSIS AUTO W/SCOPE: CPT

## 2019-08-12 ENCOUNTER — OFFICE VISIT (OUTPATIENT)
Dept: FAMILY MEDICINE CLINIC | Facility: CLINIC | Age: 79
End: 2019-08-12
Payer: MEDICARE

## 2019-08-12 VITALS
BODY MASS INDEX: 23.39 KG/M2 | RESPIRATION RATE: 16 BRPM | TEMPERATURE: 97.9 F | OXYGEN SATURATION: 97 % | DIASTOLIC BLOOD PRESSURE: 70 MMHG | HEART RATE: 62 BPM | SYSTOLIC BLOOD PRESSURE: 124 MMHG | WEIGHT: 137 LBS | HEIGHT: 64 IN

## 2019-08-12 DIAGNOSIS — Z12.39 BREAST CANCER SCREENING: ICD-10-CM

## 2019-08-12 DIAGNOSIS — G47.00 INSOMNIA, UNSPECIFIED TYPE: ICD-10-CM

## 2019-08-12 DIAGNOSIS — H25.9 AGE-RELATED CATARACT OF BOTH EYES, UNSPECIFIED AGE-RELATED CATARACT TYPE: ICD-10-CM

## 2019-08-12 DIAGNOSIS — E03.9 ACQUIRED HYPOTHYROIDISM: ICD-10-CM

## 2019-08-12 DIAGNOSIS — Z00.00 MEDICARE ANNUAL WELLNESS VISIT, SUBSEQUENT: Primary | ICD-10-CM

## 2019-08-12 DIAGNOSIS — E03.9 HYPOTHYROIDISM, UNSPECIFIED TYPE: ICD-10-CM

## 2019-08-12 DIAGNOSIS — I10 ESSENTIAL HYPERTENSION: ICD-10-CM

## 2019-08-12 PROCEDURE — G0439 PPPS, SUBSEQ VISIT: HCPCS | Performed by: SPECIALIST

## 2019-08-12 PROCEDURE — 99214 OFFICE O/P EST MOD 30 MIN: CPT | Performed by: SPECIALIST

## 2019-08-12 RX ORDER — LEVOTHYROXINE SODIUM 50 MCG
50 TABLET ORAL DAILY
Qty: 104 TABLET | Refills: 3 | Status: SHIPPED | OUTPATIENT
Start: 2019-08-12 | End: 2020-02-10 | Stop reason: SDUPTHER

## 2019-08-12 NOTE — PROGRESS NOTES
Assessment and Plan:     Problem List Items Addressed This Visit     None      Visit Diagnoses     Medicare annual wellness visit, subsequent    -  Primary    Essential hypertension        Acquired hypothyroidism        Insomnia, unspecified type             History of Present Illness:     Patient presents for Welcome to Medicare visit  Patient Care Team:  Braulio Rogers MD as PCP - General (Internal Medicine)     Review of Systems:     Review of Systems   Gastrointestinal: Negative for bowel incontinence  Psychiatric/Behavioral: The patient is nervous/anxious           Problem List:     Patient Active Problem List   Diagnosis    Benign neoplasm of meninges, unspecified ([address] )      Past Medical and Surgical History:     Past Medical History:   Diagnosis Date    Disease of thyroid gland     GERD (gastroesophageal reflux disease)     Shoulder impingement      Past Surgical History:   Procedure Laterality Date    APPENDECTOMY  1993    FEMUR SURGERY      HYSTERECTOMY  1993      Family History:     Family History   Problem Relation Age of Onset    Breast cancer Family     Colon cancer Family     Stroke Family     Hypertension Family       Social History:     Social History     Tobacco Use   Smoking Status Never Smoker   Smokeless Tobacco Never Used   Tobacco Comment    no passive smoke exposure     Social History     Substance and Sexual Activity   Alcohol Use No     Social History     Substance and Sexual Activity   Drug Use No      Medications and Allergies:     Current Outpatient Medications   Medication Sig Dispense Refill    amLODIPine (NORVASC) 5 mg tablet Take 1 tablet (5 mg total) by mouth 2 (two) times a day 180 tablet 3    B Complex Vitamins (VITAMIN-B COMPLEX PO) Take 1 tablet by mouth      metroNIDAZOLE (METROGEL) 0 75 % gel Apply topically 2 (two) times a day 45 g 1    SYNTHROID 50 MCG tablet Take 1 tablet (50 mcg total) by mouth daily Brand Only 103 tablet 3    zolpidem (AMBIEN) 5 mg tablet Take 1 tablet (5 mg total) by mouth daily 30 tablet 2     No current facility-administered medications for this visit  Allergies   Allergen Reactions    No Active Allergies       Immunizations:     Immunization History   Administered Date(s) Administered    Zoster 11/06/2014      Medicare Screening Tests and Risk Assessments:     Arash Hendrix is here for her Subsequent Wellness visit  Health Risk Assessment:  Patient rates overall health as very good  Patient feels that their physical health rating is Same  Eyesight was rated as Same  Hearing was rated as Same  Patient feels that their emotional and mental health rating is Same  Pain experienced by patient in the last 7 days has been None  Patient states that she has experienced no weight loss or gain in last 6 months  Emotional/Mental Health:  Patient has been feeling nervous/anxious  PHQ-9 Depression Screening:    Frequency of the following problems over the past two weeks:      1  Little interest or pleasure in doing things: 0 - not at all      2  Feeling down, depressed, or hopeless: 0 - not at all  PHQ-2 Score: 0          Broken Bones/Falls: Fall Risk Assessment:    In the past year, patient has experienced: No history of falling in past year          Bladder/Bowel:  Patient has leaked urine accidently in the last six months  Patient reports no loss of bowel control  Immunizations:  Patient has had a flu vaccination within the last year  Patient has received a pneumonia shot  Patient has received a shingles shot  Patient has received tetanus/diphtheria shot  Home Safety:  Patient has trouble with stairs inside or outside of their home  Patient currently reports that there are no safety hazards present in home, working smoke alarms, working carbon monoxide detectors        Preventative Screenings:   Breast cancer screening performed, colon cancer screen completed, cholesterol screen completed, glaucoma eye exam completed, Nutrition:  Current diet: Regular with servings of the following:    Medications:  Patient is not currently taking any over-the-counter supplements  Patient is able to manage medications  Lifestyle Choices:  Patient reports no tobacco use  Patient has not smoked or used tobacco in the past   Patient reports no alcohol use  Patient drives a vehicle  Patient wears seat belt  Activities of Daily Living:  Can get out of bed by his or her self, able to dress self, able to make own meals, able to do own shopping, able to bathe self, can do own laundry/housekeeping, can manage own money, pay bills and track expenses    Previous Hospitalizations:  No hospitalization or ED visit in past 12 months        Advanced Directives:  Patient has decided on a power of   Patient has spoken to designated power of   Patient has completed advanced directive          Preventative Screening/Counseling:      Cardiovascular:      General: Risks and Benefits Discussed and Screening Current      Counseling: Healthy Diet and Healthy Weight          Diabetes:      General: Risks and Benefits Discussed and Screening Current      Counseling: Healthy Diet and Healthy Weight          Colorectal Cancer:      General: Risks and Benefits Discussed and Screening Current      Counseling: high fiber diet          Breast Cancer:      General: Risks and Benefits Discussed and Screening Current          Cervical Cancer:      General: Risks and Benefits Discussed and Screening Current          Osteoporosis:      General: Risks and Benefits Discussed and Screening Current      Counseling: Calcium and Vitamin D Intake          AAA:      General: Risks and Benefits Discussed and Screening Current          Glaucoma:      General: Risks and Benefits Discussed and Screening Current          HIV:      General: Risks and Benefits Discussed and Screening Not Indicated      Counseling: HIV Prevention          Hepatitis C:      General: Risks and Benefits Discussed and Screening Current        Advanced Directives:   Patient has living will for healthcare, has durable POA for healthcare, patient has an advanced directive  Information on ACP and/or AD provided  End of life assessment reviewed with patient  Provider agrees with end of life decisions   Immunizations:  Patient reviewed and up to date          No exam data present     Physical Exam:     There were no vitals taken for this visit      Physical Exam

## 2019-08-12 NOTE — PROGRESS NOTES
Assessment/Plan:    Stable  t4  And  tsh   No  Other  Symptoms   Has   2  Hearing  Aids      For   Eye       And  [place] To   Completed           Patient seen in office today  During the visit I was accompanied by MA while physical exam was completed  No problem-specific Assessment & Plan notes found for this encounter  Problem List Items Addressed This Visit     None      Visit Diagnoses     Essential hypertension    -  Primary    Acquired hypothyroidism        Insomnia, unspecified type                Subjective:     BMI Counseling: There is no height or weight on file to calculate BMI  Discussed the patient's BMI with her  The BMI is above average  BMI counseling and education was provided to the patient  Nutrition recommendations include decreasing overall calorie intake  Depression Screening Follow-up Plan: Patient's depression screening was positive with a PHQ-2 score of   Their PHQ-9 score was   Patient assessed for underlying major depression  They have no active suicidal ideations  Brief counseling provided and recommend additional follow-up/re-evaluation next office visit  Patient ID: Luisa Maciel is a 70 y o  female  81-year-old female is seen today     Annual well visit was reviewed she needs referral for eye doctor and  Mammogram       There have been no cardio or pulmonary symptoms    He feels well    [place]   advanced  Directives  Have  Been  Done      tsh  And  t4          The following portions of the patient's history were reviewed and updated as appropriate: allergies, current medications, past family history, past medical history, past social history, past surgical history and problem list     Review of Systems   Constitutional: Negative for activity change, appetite change, chills, diaphoresis, fatigue and fever  HENT: Negative for congestion, sinus pressure, sinus pain and voice change  Eyes: Negative for visual disturbance     Respiratory: Negative for apnea, shortness of breath and wheezing  Cardiovascular: Negative for chest pain, palpitations and leg swelling  Genitourinary: Negative for difficulty urinating and dysuria  Musculoskeletal: Negative for arthralgias, back pain, gait problem, joint swelling, myalgias, neck pain and neck stiffness  Skin: Negative  Neurological: Negative for dizziness, tremors, seizures, syncope, facial asymmetry, speech difficulty, weakness, light-headedness, numbness and headaches  Psychiatric/Behavioral: Negative for agitation and behavioral problems  Objective: There were no vitals taken for this visit  Physical Exam   Constitutional: She is oriented to person, place, and time  She appears well-developed and well-nourished  No distress  HENT:   Mouth/Throat: No oropharyngeal exudate  Eyes: Right eye exhibits no discharge  Left eye exhibits no discharge  No scleral icterus  Cardiovascular: Normal rate, regular rhythm, normal heart sounds and intact distal pulses  No murmur heard  Pulmonary/Chest: Effort normal and breath sounds normal  She has no wheezes  She has no rales  Abdominal: Soft  Musculoskeletal: She exhibits no edema or deformity  Neurological: She is alert and oriented to person, place, and time  Skin: Skin is warm and dry  She is not diaphoretic  Psychiatric: She has a normal mood and affect

## 2019-11-05 ENCOUNTER — HOSPITAL ENCOUNTER (OUTPATIENT)
Dept: MAMMOGRAPHY | Facility: CLINIC | Age: 79
Discharge: HOME | End: 2019-11-05
Payer: MEDICARE

## 2019-11-05 VITALS — HEIGHT: 64 IN | BODY MASS INDEX: 23.39 KG/M2 | WEIGHT: 137 LBS

## 2019-11-05 DIAGNOSIS — Z12.39 BREAST CANCER SCREENING: ICD-10-CM

## 2019-11-05 PROCEDURE — 77063 BREAST TOMOSYNTHESIS BI: CPT

## 2019-11-05 PROCEDURE — 77067 SCR MAMMO BI INCL CAD: CPT

## 2019-11-06 ENCOUNTER — TELEPHONE (OUTPATIENT)
Dept: FAMILY MEDICINE CLINIC | Facility: CLINIC | Age: 79
End: 2019-11-06

## 2020-01-28 ENCOUNTER — TRANSCRIBE ORDERS (OUTPATIENT)
Dept: LAB | Facility: CLINIC | Age: 80
End: 2020-01-28

## 2020-01-28 ENCOUNTER — APPOINTMENT (OUTPATIENT)
Dept: LAB | Facility: CLINIC | Age: 80
End: 2020-01-28
Payer: MEDICARE

## 2020-01-28 DIAGNOSIS — G47.00 INSOMNIA, UNSPECIFIED TYPE: ICD-10-CM

## 2020-01-28 DIAGNOSIS — E03.9 ACQUIRED HYPOTHYROIDISM: ICD-10-CM

## 2020-01-28 DIAGNOSIS — Z00.00 MEDICARE ANNUAL WELLNESS VISIT, SUBSEQUENT: ICD-10-CM

## 2020-01-28 DIAGNOSIS — I10 ESSENTIAL HYPERTENSION: ICD-10-CM

## 2020-01-28 DIAGNOSIS — E03.9 HYPOTHYROIDISM, UNSPECIFIED TYPE: ICD-10-CM

## 2020-01-28 LAB
ALBUMIN SERPL BCP-MCNC: 3.7 G/DL (ref 3.5–5)
ALP SERPL-CCNC: 51 U/L (ref 46–116)
ALT SERPL W P-5'-P-CCNC: 27 U/L (ref 12–78)
ANION GAP SERPL CALCULATED.3IONS-SCNC: 10 MMOL/L (ref 4–13)
AST SERPL W P-5'-P-CCNC: 12 U/L (ref 5–45)
BACTERIA UR QL AUTO: ABNORMAL /HPF
BASOPHILS # BLD AUTO: 0.06 THOUSANDS/ΜL (ref 0–0.1)
BASOPHILS NFR BLD AUTO: 1 % (ref 0–1)
BILIRUB SERPL-MCNC: 0.37 MG/DL (ref 0.2–1)
BILIRUB UR QL STRIP: NEGATIVE
BUN SERPL-MCNC: 18 MG/DL (ref 5–25)
CALCIUM SERPL-MCNC: 8.7 MG/DL (ref 8.3–10.1)
CHLORIDE SERPL-SCNC: 106 MMOL/L (ref 100–108)
CHOLEST SERPL-MCNC: 217 MG/DL (ref 50–200)
CLARITY UR: CLEAR
CO2 SERPL-SCNC: 26 MMOL/L (ref 21–32)
COLOR UR: YELLOW
CREAT SERPL-MCNC: 0.71 MG/DL (ref 0.6–1.3)
EOSINOPHIL # BLD AUTO: 0.13 THOUSAND/ΜL (ref 0–0.61)
EOSINOPHIL NFR BLD AUTO: 2 % (ref 0–6)
ERYTHROCYTE [DISTWIDTH] IN BLOOD BY AUTOMATED COUNT: 14.4 % (ref 11.6–15.1)
GFR SERPL CREATININE-BSD FRML MDRD: 82 ML/MIN/1.73SQ M
GLUCOSE P FAST SERPL-MCNC: 90 MG/DL (ref 65–99)
GLUCOSE UR STRIP-MCNC: NEGATIVE MG/DL
HCT VFR BLD AUTO: 40.3 % (ref 34.8–46.1)
HDLC SERPL-MCNC: 58 MG/DL
HGB BLD-MCNC: 12.7 G/DL (ref 11.5–15.4)
HGB UR QL STRIP.AUTO: NEGATIVE
IMM GRANULOCYTES # BLD AUTO: 0 THOUSAND/UL (ref 0–0.2)
IMM GRANULOCYTES NFR BLD AUTO: 0 % (ref 0–2)
KETONES UR STRIP-MCNC: NEGATIVE MG/DL
LDLC SERPL CALC-MCNC: 138 MG/DL (ref 0–100)
LEUKOCYTE ESTERASE UR QL STRIP: ABNORMAL
LYMPHOCYTES # BLD AUTO: 3.07 THOUSANDS/ΜL (ref 0.6–4.47)
LYMPHOCYTES NFR BLD AUTO: 48 % (ref 14–44)
MCH RBC QN AUTO: 28.7 PG (ref 26.8–34.3)
MCHC RBC AUTO-ENTMCNC: 31.5 G/DL (ref 31.4–37.4)
MCV RBC AUTO: 91 FL (ref 82–98)
MONOCYTES # BLD AUTO: 0.5 THOUSAND/ΜL (ref 0.17–1.22)
MONOCYTES NFR BLD AUTO: 8 % (ref 4–12)
NEUTROPHILS # BLD AUTO: 2.56 THOUSANDS/ΜL (ref 1.85–7.62)
NEUTS SEG NFR BLD AUTO: 41 % (ref 43–75)
NITRITE UR QL STRIP: POSITIVE
NON-SQ EPI CELLS URNS QL MICRO: ABNORMAL /HPF
NONHDLC SERPL-MCNC: 159 MG/DL
NRBC BLD AUTO-RTO: 0 /100 WBCS
PH UR STRIP.AUTO: 7 [PH]
PLATELET # BLD AUTO: 239 THOUSANDS/UL (ref 149–390)
PMV BLD AUTO: 9.8 FL (ref 8.9–12.7)
POTASSIUM SERPL-SCNC: 3.7 MMOL/L (ref 3.5–5.3)
PROT SERPL-MCNC: 7.6 G/DL (ref 6.4–8.2)
PROT UR STRIP-MCNC: NEGATIVE MG/DL
RBC # BLD AUTO: 4.42 MILLION/UL (ref 3.81–5.12)
RBC #/AREA URNS AUTO: ABNORMAL /HPF
SODIUM SERPL-SCNC: 142 MMOL/L (ref 136–145)
SP GR UR STRIP.AUTO: 1.01 (ref 1–1.03)
T4 FREE SERPL-MCNC: 1.23 NG/DL (ref 0.76–1.46)
TRIGL SERPL-MCNC: 106 MG/DL
TSH SERPL DL<=0.05 MIU/L-ACNC: 0.67 UIU/ML (ref 0.36–3.74)
UROBILINOGEN UR QL STRIP.AUTO: 0.2 E.U./DL
WBC # BLD AUTO: 6.32 THOUSAND/UL (ref 4.31–10.16)
WBC #/AREA URNS AUTO: ABNORMAL /HPF

## 2020-01-28 PROCEDURE — 84443 ASSAY THYROID STIM HORMONE: CPT

## 2020-01-28 PROCEDURE — 85025 COMPLETE CBC W/AUTO DIFF WBC: CPT

## 2020-01-28 PROCEDURE — 36415 COLL VENOUS BLD VENIPUNCTURE: CPT

## 2020-01-28 PROCEDURE — 80061 LIPID PANEL: CPT

## 2020-01-28 PROCEDURE — 84439 ASSAY OF FREE THYROXINE: CPT

## 2020-01-28 PROCEDURE — 81001 URINALYSIS AUTO W/SCOPE: CPT | Performed by: SPECIALIST

## 2020-01-28 PROCEDURE — 80053 COMPREHEN METABOLIC PANEL: CPT

## 2020-02-04 ENCOUNTER — TELEPHONE (OUTPATIENT)
Dept: FAMILY MEDICINE CLINIC | Facility: CLINIC | Age: 80
End: 2020-02-04

## 2020-02-04 ENCOUNTER — APPOINTMENT (OUTPATIENT)
Dept: LAB | Facility: CLINIC | Age: 80
End: 2020-02-04
Payer: MEDICARE

## 2020-02-04 DIAGNOSIS — R30.0 DYSURIA: Primary | ICD-10-CM

## 2020-02-04 LAB
BACTERIA UR QL AUTO: ABNORMAL /HPF
BILIRUB UR QL STRIP: NEGATIVE
CLARITY UR: CLEAR
COLOR UR: YELLOW
GLUCOSE UR STRIP-MCNC: NEGATIVE MG/DL
HGB UR QL STRIP.AUTO: NEGATIVE
KETONES UR STRIP-MCNC: NEGATIVE MG/DL
LEUKOCYTE ESTERASE UR QL STRIP: ABNORMAL
NITRITE UR QL STRIP: POSITIVE
NON-SQ EPI CELLS URNS QL MICRO: ABNORMAL /HPF
PH UR STRIP.AUTO: 7 [PH]
PROT UR STRIP-MCNC: NEGATIVE MG/DL
RBC #/AREA URNS AUTO: ABNORMAL /HPF
SP GR UR STRIP.AUTO: 1.01 (ref 1–1.03)
UROBILINOGEN UR QL STRIP.AUTO: 0.2 E.U./DL
WBC #/AREA URNS AUTO: ABNORMAL /HPF

## 2020-02-04 PROCEDURE — 81001 URINALYSIS AUTO W/SCOPE: CPT | Performed by: SPECIALIST

## 2020-02-04 NOTE — TELEPHONE ENCOUNTER
Let  Dr Partha Montero  Know  I  Ordered  A  Urinalysis    Reflex  To   Culture   Sensitivity  [place]  [facility]              Can  Go  To   Lab   Its  In  The  Computer

## 2020-02-04 NOTE — TELEPHONE ENCOUNTER
Call from pt spouse states that pt is currently having UTI symptoms of urinary frequency and burning  No blood no odor  Spouse Is requesting for Dr Choe to put UA order in and he will take urine specimen in sterile cup to [facility]  Please advise thank you

## 2020-02-10 ENCOUNTER — OFFICE VISIT (OUTPATIENT)
Dept: FAMILY MEDICINE CLINIC | Facility: CLINIC | Age: 80
End: 2020-02-10
Payer: MEDICARE

## 2020-02-10 VITALS
WEIGHT: 138.8 LBS | HEIGHT: 64 IN | OXYGEN SATURATION: 97 % | SYSTOLIC BLOOD PRESSURE: 120 MMHG | BODY MASS INDEX: 23.7 KG/M2 | TEMPERATURE: 95.8 F | DIASTOLIC BLOOD PRESSURE: 72 MMHG | HEART RATE: 65 BPM

## 2020-02-10 DIAGNOSIS — G47.00 INSOMNIA, UNSPECIFIED TYPE: ICD-10-CM

## 2020-02-10 DIAGNOSIS — E03.9 ACQUIRED HYPOTHYROIDISM: ICD-10-CM

## 2020-02-10 DIAGNOSIS — E03.9 HYPOTHYROIDISM, UNSPECIFIED TYPE: ICD-10-CM

## 2020-02-10 DIAGNOSIS — I10 ESSENTIAL HYPERTENSION: Primary | ICD-10-CM

## 2020-02-10 DIAGNOSIS — D32.9 BENIGN NEOPLASM OF MENINGES, UNSPECIFIED (HCC): ICD-10-CM

## 2020-02-10 PROCEDURE — 1160F RVW MEDS BY RX/DR IN RCRD: CPT | Performed by: SPECIALIST

## 2020-02-10 PROCEDURE — 3078F DIAST BP <80 MM HG: CPT | Performed by: SPECIALIST

## 2020-02-10 PROCEDURE — 3008F BODY MASS INDEX DOCD: CPT | Performed by: SPECIALIST

## 2020-02-10 PROCEDURE — 1036F TOBACCO NON-USER: CPT | Performed by: SPECIALIST

## 2020-02-10 PROCEDURE — 99214 OFFICE O/P EST MOD 30 MIN: CPT | Performed by: SPECIALIST

## 2020-02-10 PROCEDURE — 3074F SYST BP LT 130 MM HG: CPT | Performed by: SPECIALIST

## 2020-02-10 RX ORDER — ZOLPIDEM TARTRATE 5 MG/1
5 TABLET ORAL DAILY
Qty: 30 TABLET | Refills: 3 | Status: SHIPPED | OUTPATIENT
Start: 2020-02-10 | End: 2020-09-16 | Stop reason: SDUPTHER

## 2020-02-10 RX ORDER — AMLODIPINE BESYLATE 5 MG/1
5 TABLET ORAL 2 TIMES DAILY
Qty: 180 TABLET | Refills: 1 | Status: SHIPPED | OUTPATIENT
Start: 2020-02-10 | End: 2020-09-11

## 2020-02-10 RX ORDER — METRONIDAZOLE 7.5 MG/G
GEL TOPICAL 2 TIMES DAILY
Qty: 135 G | Refills: 1 | Status: SHIPPED | OUTPATIENT
Start: 2020-02-10 | End: 2021-01-20 | Stop reason: SDUPTHER

## 2020-02-10 RX ORDER — LEVOTHYROXINE SODIUM 50 MCG
TABLET ORAL
Qty: 117 TABLET | Refills: 3 | Status: SHIPPED | OUTPATIENT
Start: 2020-02-10 | End: 2020-11-13

## 2020-02-10 NOTE — PROGRESS NOTES
Assessment/Plan:    Patient seen today she is 68years old her only complaint on walking is right knee pain there is no cardio or pulmonary symptoms she goes to the eye doctor dentist to neurosurgeon who follows her for 3 meningiomas on the brain she does not have chronic headaches but occasional headaches    Laboratory studies reviewed she also gets mammography pelvic exams and colonoscopies when indicated    Stable    Patient seen in office today  During the visit I was accompanied by MA while physical exam was completed  No problem-specific Assessment & Plan notes found for this encounter           Problem List Items Addressed This Visit        Nervous and Auditory    Benign neoplasm of meninges, unspecified (HCC)    Relevant Medications    metroNIDAZOLE (METROGEL) 0 75 % gel    zolpidem (AMBIEN) 5 mg tablet    Other Relevant Orders    CBC and differential    Comprehensive metabolic panel    Lipid panel    UA w Reflex to Microscopic w Reflex to Culture -Lab Collect    TSH, 3rd generation    T4, free      Other Visit Diagnoses     Essential hypertension        Relevant Medications    amLODIPine (NORVASC) 5 mg tablet    metroNIDAZOLE (METROGEL) 0 75 % gel    SYNTHROID 50 MCG tablet    zolpidem (AMBIEN) 5 mg tablet    Other Relevant Orders    CBC and differential    Comprehensive metabolic panel    Lipid panel    UA w Reflex to Microscopic w Reflex to Culture -Lab Collect    TSH, 3rd generation    T4, free    Acquired hypothyroidism        Relevant Medications    metroNIDAZOLE (METROGEL) 0 75 % gel    SYNTHROID 50 MCG tablet    zolpidem (AMBIEN) 5 mg tablet    Other Relevant Orders    CBC and differential    Comprehensive metabolic panel    Lipid panel    UA w Reflex to Microscopic w Reflex to Culture -Lab Collect    TSH, 3rd generation    T4, free    Insomnia, unspecified type        Relevant Medications    metroNIDAZOLE (METROGEL) 0 75 % gel    SYNTHROID 50 MCG tablet    zolpidem (AMBIEN) 5 mg tablet    Other Relevant Orders    CBC and differential    Comprehensive metabolic panel    Lipid panel    UA w Reflex to Microscopic w Reflex to Culture -Lab Collect    TSH, 3rd generation    T4, free    Hypothyroidism, unspecified type        Relevant Medications    metroNIDAZOLE (METROGEL) 0 75 % gel    SYNTHROID 50 MCG tablet    zolpidem (AMBIEN) 5 mg tablet    Other Relevant Orders    CBC and differential    Comprehensive metabolic panel    Lipid panel    UA w Reflex to Microscopic w Reflex to Culture -Lab Collect    TSH, 3rd generation    T4, free    Medicare annual wellness visit, subsequent        Relevant Medications    SYNTHROID 50 MCG tablet    Other Relevant Orders    CBC and differential    Comprehensive metabolic panel    Lipid panel    UA w Reflex to Microscopic w Reflex to Culture -Lab Collect    TSH, 3rd generation    T4, free            Subjective:            Patient ID: Brenna Cabrera is a 68 y o  female  24-year-old female when walking developed right knee pain no cardio or pulmonary symptoms she is being followed by Neurosurgery for meningiomas x3 no surgery has been done she does not have any chronic headaches or symptoms from this    Lab reviewed    She has a history of her chronic urinary tract infections the present time she has asymptomatic bacteriuria      The following portions of the patient's history were reviewed and updated as appropriate: allergies, past family history and past social history  Review of Systems   Constitutional: Negative for activity change, appetite change, chills, diaphoresis, fatigue and fever  HENT: Negative for congestion, sinus pressure, sinus pain and voice change  Eyes: Negative for visual disturbance  Respiratory: Negative for cough, chest tightness, shortness of breath and wheezing  Cardiovascular: Negative for chest pain, palpitations and leg swelling  Gastrointestinal: Negative for abdominal distention and abdominal pain     Genitourinary: Negative for difficulty urinating and dysuria  Musculoskeletal: Positive for arthralgias  Negative for back pain, gait problem, joint swelling, myalgias, neck pain and neck stiffness  Skin: Negative  Neurological: Negative for dizziness, tremors, seizures, syncope, facial asymmetry, speech difficulty, weakness, light-headedness, numbness and headaches  Objective:      /72 (BP Location: Left arm, Patient Position: Sitting, Cuff Size: Standard)   Pulse 65   Temp (!) 95 8 °F (35 4 °C) (Tympanic)   Ht 5' 4" (1 626 m)   Wt 63 kg (138 lb 12 8 oz)   SpO2 97%   BMI 23 82 kg/m²          Physical Exam   Constitutional: She is oriented to person, place, and time  She appears well-developed and well-nourished  No distress  HENT:   Mouth/Throat: No oropharyngeal exudate  Eyes: Pupils are equal, round, and reactive to light  EOM are normal    Neck: No JVD present  Cardiovascular: Normal rate, regular rhythm, normal heart sounds and intact distal pulses  No murmur heard  Pulmonary/Chest: Effort normal and breath sounds normal  No respiratory distress  Abdominal: Soft  Bowel sounds are normal  She exhibits no distension  Musculoskeletal: Normal range of motion  Neurological: She is alert and oriented to person, place, and time  Skin: Skin is warm and dry  She is not diaphoretic  Psychiatric: She has a normal mood and affect

## 2020-06-30 ENCOUNTER — TRANSCRIBE ORDERS (OUTPATIENT)
Dept: ADMINISTRATIVE | Facility: HOSPITAL | Age: 80
End: 2020-06-30

## 2020-06-30 DIAGNOSIS — D32.9 MENINGIOMA (HCC): Primary | ICD-10-CM

## 2020-07-06 ENCOUNTER — HOSPITAL ENCOUNTER (OUTPATIENT)
Dept: MRI IMAGING | Facility: HOSPITAL | Age: 80
Discharge: HOME | End: 2020-07-06
Payer: MEDICARE

## 2020-07-06 DIAGNOSIS — D32.9 MENINGIOMA (HCC): ICD-10-CM

## 2020-07-06 PROCEDURE — A9585 GADOBUTROL INJECTION: HCPCS | Performed by: OPHTHALMOLOGY

## 2020-07-06 PROCEDURE — 70553 MRI BRAIN STEM W/O & W/DYE: CPT

## 2020-07-06 RX ADMIN — GADOBUTROL 6 ML: 604.72 INJECTION INTRAVENOUS at 11:02

## 2020-07-30 ENCOUNTER — APPOINTMENT (OUTPATIENT)
Dept: LAB | Facility: CLINIC | Age: 80
End: 2020-07-30
Payer: MEDICARE

## 2020-07-30 ENCOUNTER — TRANSCRIBE ORDERS (OUTPATIENT)
Dept: LAB | Facility: CLINIC | Age: 80
End: 2020-07-30

## 2020-07-30 DIAGNOSIS — E03.9 ACQUIRED HYPOTHYROIDISM: ICD-10-CM

## 2020-07-30 DIAGNOSIS — G47.00 INSOMNIA, UNSPECIFIED TYPE: ICD-10-CM

## 2020-07-30 DIAGNOSIS — Z79.899 ENCOUNTER FOR LONG-TERM (CURRENT) USE OF OTHER MEDICATIONS: ICD-10-CM

## 2020-07-30 DIAGNOSIS — Z79.899 ENCOUNTER FOR LONG-TERM (CURRENT) USE OF OTHER MEDICATIONS: Primary | ICD-10-CM

## 2020-07-30 DIAGNOSIS — L50.9 URTICARIA, UNSPECIFIED: ICD-10-CM

## 2020-07-30 DIAGNOSIS — I10 ESSENTIAL HYPERTENSION, BENIGN: ICD-10-CM

## 2020-07-30 DIAGNOSIS — E03.9 HYPOTHYROIDISM, UNSPECIFIED TYPE: ICD-10-CM

## 2020-07-30 DIAGNOSIS — I10 ESSENTIAL HYPERTENSION: ICD-10-CM

## 2020-07-30 DIAGNOSIS — L30.8 INTERFACE DERMATITIS: ICD-10-CM

## 2020-07-30 DIAGNOSIS — D32.9 BENIGN NEOPLASM OF MENINGES, UNSPECIFIED (HCC): ICD-10-CM

## 2020-07-30 LAB
ALBUMIN SERPL BCP-MCNC: 3.5 G/DL (ref 3.5–5)
ALP SERPL-CCNC: 55 U/L (ref 46–116)
ALT SERPL W P-5'-P-CCNC: 31 U/L (ref 12–78)
ANION GAP SERPL CALCULATED.3IONS-SCNC: 10 MMOL/L (ref 4–13)
AST SERPL W P-5'-P-CCNC: 18 U/L (ref 5–45)
BACTERIA UR QL AUTO: ABNORMAL /HPF
BASOPHILS # BLD AUTO: 0.04 THOUSANDS/ΜL (ref 0–0.1)
BASOPHILS NFR BLD AUTO: 1 % (ref 0–1)
BILIRUB SERPL-MCNC: 0.24 MG/DL (ref 0.2–1)
BILIRUB UR QL STRIP: NEGATIVE
BUN SERPL-MCNC: 21 MG/DL (ref 5–25)
CALCIUM SERPL-MCNC: 8.4 MG/DL (ref 8.3–10.1)
CHLORIDE SERPL-SCNC: 106 MMOL/L (ref 100–108)
CHOLEST SERPL-MCNC: 197 MG/DL (ref 50–200)
CLARITY UR: ABNORMAL
CO2 SERPL-SCNC: 23 MMOL/L (ref 21–32)
COLOR UR: YELLOW
CREAT SERPL-MCNC: 0.76 MG/DL (ref 0.6–1.3)
EOSINOPHIL # BLD AUTO: 0.03 THOUSAND/ΜL (ref 0–0.61)
EOSINOPHIL NFR BLD AUTO: 0 % (ref 0–6)
ERYTHROCYTE [DISTWIDTH] IN BLOOD BY AUTOMATED COUNT: 13.4 % (ref 11.6–15.1)
GFR SERPL CREATININE-BSD FRML MDRD: 75 ML/MIN/1.73SQ M
GLUCOSE P FAST SERPL-MCNC: 106 MG/DL (ref 65–99)
GLUCOSE UR STRIP-MCNC: NEGATIVE MG/DL
HCT VFR BLD AUTO: 37.4 % (ref 34.8–46.1)
HDLC SERPL-MCNC: 55 MG/DL
HGB BLD-MCNC: 12.1 G/DL (ref 11.5–15.4)
HGB UR QL STRIP.AUTO: NEGATIVE
IMM GRANULOCYTES # BLD AUTO: 0.02 THOUSAND/UL (ref 0–0.2)
IMM GRANULOCYTES NFR BLD AUTO: 0 % (ref 0–2)
KETONES UR STRIP-MCNC: NEGATIVE MG/DL
LDLC SERPL CALC-MCNC: 124 MG/DL (ref 0–100)
LEUKOCYTE ESTERASE UR QL STRIP: ABNORMAL
LYMPHOCYTES # BLD AUTO: 2.2 THOUSANDS/ΜL (ref 0.6–4.47)
LYMPHOCYTES NFR BLD AUTO: 32 % (ref 14–44)
MCH RBC QN AUTO: 29.4 PG (ref 26.8–34.3)
MCHC RBC AUTO-ENTMCNC: 32.4 G/DL (ref 31.4–37.4)
MCV RBC AUTO: 91 FL (ref 82–98)
MONOCYTES # BLD AUTO: 0.45 THOUSAND/ΜL (ref 0.17–1.22)
MONOCYTES NFR BLD AUTO: 7 % (ref 4–12)
NEUTROPHILS # BLD AUTO: 4.19 THOUSANDS/ΜL (ref 1.85–7.62)
NEUTS SEG NFR BLD AUTO: 60 % (ref 43–75)
NITRITE UR QL STRIP: NEGATIVE
NON-SQ EPI CELLS URNS QL MICRO: ABNORMAL /HPF
NONHDLC SERPL-MCNC: 142 MG/DL
NRBC BLD AUTO-RTO: 0 /100 WBCS
PH UR STRIP.AUTO: 7 [PH]
PLATELET # BLD AUTO: 238 THOUSANDS/UL (ref 149–390)
PMV BLD AUTO: 9.7 FL (ref 8.9–12.7)
POTASSIUM SERPL-SCNC: 3.8 MMOL/L (ref 3.5–5.3)
PROT SERPL-MCNC: 7.3 G/DL (ref 6.4–8.2)
PROT UR STRIP-MCNC: NEGATIVE MG/DL
RBC # BLD AUTO: 4.12 MILLION/UL (ref 3.81–5.12)
RBC #/AREA URNS AUTO: ABNORMAL /HPF
SODIUM SERPL-SCNC: 139 MMOL/L (ref 136–145)
SP GR UR STRIP.AUTO: 1.01 (ref 1–1.03)
T4 FREE SERPL-MCNC: 1.38 NG/DL (ref 0.76–1.46)
TRIGL SERPL-MCNC: 89 MG/DL
TSH SERPL DL<=0.05 MIU/L-ACNC: 0.22 UIU/ML (ref 0.36–3.74)
UROBILINOGEN UR QL STRIP.AUTO: 0.2 E.U./DL
WBC # BLD AUTO: 6.93 THOUSAND/UL (ref 4.31–10.16)
WBC #/AREA URNS AUTO: ABNORMAL /HPF

## 2020-07-30 PROCEDURE — 80061 LIPID PANEL: CPT

## 2020-07-30 PROCEDURE — 84439 ASSAY OF FREE THYROXINE: CPT

## 2020-07-30 PROCEDURE — 81001 URINALYSIS AUTO W/SCOPE: CPT | Performed by: SPECIALIST

## 2020-07-30 PROCEDURE — 85025 COMPLETE CBC W/AUTO DIFF WBC: CPT

## 2020-07-30 PROCEDURE — 80053 COMPREHEN METABOLIC PANEL: CPT

## 2020-07-30 PROCEDURE — 84443 ASSAY THYROID STIM HORMONE: CPT

## 2020-07-30 PROCEDURE — 36415 COLL VENOUS BLD VENIPUNCTURE: CPT

## 2020-09-02 ENCOUNTER — OFFICE VISIT (OUTPATIENT)
Dept: FAMILY MEDICINE CLINIC | Facility: CLINIC | Age: 80
End: 2020-09-02
Payer: MEDICARE

## 2020-09-02 VITALS
TEMPERATURE: 97 F | WEIGHT: 137 LBS | DIASTOLIC BLOOD PRESSURE: 80 MMHG | SYSTOLIC BLOOD PRESSURE: 140 MMHG | OXYGEN SATURATION: 97 % | HEART RATE: 60 BPM | RESPIRATION RATE: 18 BRPM | HEIGHT: 64 IN | BODY MASS INDEX: 23.39 KG/M2

## 2020-09-02 DIAGNOSIS — E03.9 ACQUIRED HYPOTHYROIDISM: Primary | ICD-10-CM

## 2020-09-02 PROCEDURE — G0439 PPPS, SUBSEQ VISIT: HCPCS | Performed by: SPECIALIST

## 2020-09-02 PROCEDURE — 99214 OFFICE O/P EST MOD 30 MIN: CPT | Performed by: SPECIALIST

## 2020-09-02 NOTE — PATIENT INSTRUCTIONS
Continue your new dose of thyroid replacement check T4 and TSH to see if the TSH is now back in the normal limits    Follow-up within 6 months with a new physician

## 2020-09-02 NOTE — PROGRESS NOTES
Assessment and Plan:     Problem List Items Addressed This Visit     None           Preventive health issues were discussed with patient, and age appropriate screening tests were ordered as noted in patient's After Visit Summary  Personalized health advice and appropriate referrals for health education or preventive services given if needed, as noted in patient's After Visit Summary       History of Present Illness:     Patient presents for Medicare Annual Wellness visit    Patient Care Team:  Melida Fraga MD as PCP - General (Internal Medicine)     Problem List:     Patient Active Problem List   Diagnosis    Benign neoplasm of meninges, unspecified [facility])      Past Medical and Surgical History:     Past Medical History:   Diagnosis Date    Disease of thyroid gland     GERD (gastroesophageal reflux disease)     Shoulder impingement      Past Surgical History:   Procedure Laterality Date    APPENDECTOMY  1993    FEMUR SURGERY      HYSTERECTOMY  1993    OOPHORECTOMY        Family History:     Family History   Problem Relation Age of Onset   Lourdes Courser Breast cancer Family     Colon cancer Family     Stroke Family     Hypertension Family     Colon cancer Mother 80    Breast cancer Sister 44      Social History:        Social History     Socioeconomic History    Marital status: /Civil Union     Spouse name: None    Number of children: None    Years of education: None    Highest education level: None   Occupational History    Occupation: retired   Social Needs    Financial resource strain: Not hard at all   Jules-Gina insecurity     Worry: Never true     Inability: Never true    Transportation needs     Medical: No     Non-medical: No   Tobacco Use    Smoking status: Never Smoker    Smokeless tobacco: Never Used    Tobacco comment: no passive smoke exposure   Substance and Sexual Activity    Alcohol use: No    Drug use: No    Sexual activity: Not Currently     Partners: Male     Birth control/protection: None     Comment:    Lifestyle    Physical activity     Days per week: 7 days     Minutes per session: 40 min    Stress: Not at all   Relationships    Social connections     Talks on phone: More than three times a week     Gets together: More than three times a week     Attends [employer] service: 1 to 4 times per year     Active member of club or organization: No     Attends meetings of clubs or organizations: Never     Relationship status:     Intimate partner violence     Fear of current or ex partner: No     Emotionally abused: No     Physically abused: No     Forced sexual activity: No   Other Topics Concern    None   Social History Narrative    None      Medications and Allergies:     Current Outpatient Medications   Medication Sig Dispense Refill    amLODIPine (NORVASC) 5 mg tablet Take 1 tablet (5 mg total) by mouth 2 (two) times a day 180 tablet 1    B Complex Vitamins (VITAMIN-B COMPLEX PO) Take 1 tablet by mouth      metroNIDAZOLE (METROGEL) 0 75 % gel Apply topically 2 (two) times a day 135 g 1    SYNTHROID 50 MCG tablet Brand Only Pt is to take 1 1/4 tab daily 117 tablet 3    zolpidem (AMBIEN) 5 mg tablet Take 1 tablet (5 mg total) by mouth daily 30 tablet 3     No current facility-administered medications for this visit  Allergies   Allergen Reactions    No Active Allergies       Immunizations:     Immunization History   Administered Date(s) Administered    Zoster 11/06/2014      Health Maintenance: There are no preventive care reminders to display for this patient        Topic Date Due    DTaP,Tdap,and Td Vaccines (1 - Tdap) 01/21/1961    Pneumococcal Vaccine: 65+ Years (1 of 1 - PPSV23) 01/21/2007    Influenza Vaccine  07/01/2020      Medicare Health Risk Assessment:     /80 (BP Location: Left arm, Patient Position: Sitting, Cuff Size: Adult)   Pulse 60   Temp (!) 97 °F (36 1 °C)   Resp 18   Ht 5' 4" (1 626 m)   Wt 62 1 kg (137 lb) SpO2 97%   BMI 23 52 kg/m²      Kameron Rdz is here for her Subsequent Wellness visit  Health Risk Assessment:   Patient rates overall health as fair  Patient feels that their physical health rating is same  Eyesight was rated as same  Hearing was rated as same  Patient feels that their emotional and mental health rating is same  Pain experienced in the last 7 days has been none  Patient states that she has experienced no weight loss or gain in last 6 months  Depression Screening:   PHQ-2 Score: 0      Fall Risk Screening: In the past year, patient has experienced: no history of falling in past year      Urinary Incontinence Screening:   Patient has not leaked urine accidently in the last six months  Home Safety:  Patient has trouble with stairs inside or outside of their home  Patient has working smoke alarms Home safety hazards include: none  Nutrition:   Current diet is Regular  Medications:   Patient is currently taking over-the-counter supplements  OTC medications include: see medication list  Patient is able to manage medications  Activities of Daily Living (ADLs)/Instrumental Activities of Daily Living (IADLs):   Walk and transfer into and out of bed and chair?: Yes  Dress and groom yourself?: Yes    Bathe or shower yourself?: Yes    Feed yourself? Yes  Do your laundry/housekeeping?: Yes  Manage your money, pay your bills and track your expenses?: Yes  Make your own meals?: Yes    Do your own shopping?: Yes    Previous Hospitalizations:   Any hospitalizations or ED visits within the last 12 months?: No      Advance Care Planning:   Living will: Yes    Durable POA for healthcare:  Yes    Advanced directive: Yes      PREVENTIVE SCREENINGS      Cardiovascular Screening:    General: Screening Current      Diabetes Screening:     General: Screening Current      Breast Cancer Screening:     General: Screening Current      Cervical Cancer Screening:    General: Screening Not Indicated Lung Cancer Screening:     General: Screening Not Indicated      Zaid Burleson MD

## 2020-09-02 NOTE — PROGRESS NOTES
Assessment/Plan:    Patient seen today and generally feeling well she has decreased hearing and 2 hearing aids she walks well and can walk for more than a mi she does not have any cardiopulmonary symptoms    Her TSH is slightly less than normal and she reduced her thyroid replacement slightly T4 and TSH will be repeated to compare and attempt to keep her TSH is in the normal limits as well as the T4        Patient seen in office today  During the visit I was accompanied by MA while physical exam was completed  No problem-specific Assessment & Plan notes found for this encounter  Problem List Items Addressed This Visit     None      Visit Diagnoses     Acquired hypothyroidism    -  Primary    Relevant Orders    T4, free    TSH, 3rd generation            Subjective:            Patient ID: Chinyere Cruz is a 68 y o  female  70-year-old female generally feels well she has hypothyroidism she is on Synthroid replacement therapy her TSH is slightly reduced therefore she did reduce her dose slightly at home and will repeat her T4 and TSH is see she is now normal      The following portions of the patient's history were reviewed and updated as appropriate: allergies, past family history, past social history and past surgical history  Review of Systems   Constitutional: Negative for activity change, appetite change, chills, diaphoresis, fatigue and fever  HENT: Positive for hearing loss  Negative for congestion, sinus pressure, sinus pain and voice change  Eyes: Positive for visual disturbance  Patient will be seeing her eye doctor since her vision is decreased   Respiratory: Negative for cough, chest tightness, shortness of breath and wheezing  Cardiovascular: Negative for chest pain, palpitations and leg swelling  Gastrointestinal: Negative for abdominal distention and abdominal pain  Genitourinary: Negative for difficulty urinating     Musculoskeletal: Negative for arthralgias, back pain, gait problem, joint swelling, myalgias, neck pain and neck stiffness  Skin: Negative  Neurological: Negative for dizziness, tremors, seizures, syncope, facial asymmetry, speech difficulty, weakness, light-headedness, numbness and headaches  Psychiatric/Behavioral: Negative for behavioral problems  Objective:      /80 (BP Location: Left arm, Patient Position: Sitting, Cuff Size: Adult)   Pulse 60   Temp (!) 97 °F (36 1 °C)   Resp 18   Ht 5' 4" (1 626 m)   Wt 62 1 kg (137 lb)   SpO2 97%   BMI 23 52 kg/m²          Physical Exam  Constitutional:       General: She is not in acute distress  Appearance: Normal appearance  She is normal weight  She is not ill-appearing, toxic-appearing or diaphoretic  Eyes:      Pupils: Pupils are equal, round, and reactive to light  Neck:      Musculoskeletal: No neck rigidity  Cardiovascular:      Rate and Rhythm: Normal rate and regular rhythm  Heart sounds: No murmur  Pulmonary:      Effort: Pulmonary effort is normal       Breath sounds: Normal breath sounds  No wheezing or rales  Abdominal:      General: There is no distension  Musculoskeletal:         General: No swelling  Skin:     General: Skin is warm  Neurological:      General: No focal deficit present  Mental Status: She is alert     Psychiatric:         Mood and Affect: Mood normal

## 2020-09-11 ENCOUNTER — APPOINTMENT (OUTPATIENT)
Dept: LAB | Facility: CLINIC | Age: 80
End: 2020-09-11
Payer: MEDICARE

## 2020-09-11 DIAGNOSIS — I10 ESSENTIAL HYPERTENSION: ICD-10-CM

## 2020-09-11 DIAGNOSIS — E03.9 ACQUIRED HYPOTHYROIDISM: ICD-10-CM

## 2020-09-11 LAB
T4 FREE SERPL-MCNC: 1.33 NG/DL (ref 0.76–1.46)
TSH SERPL DL<=0.05 MIU/L-ACNC: 0.69 UIU/ML (ref 0.36–3.74)

## 2020-09-11 PROCEDURE — 84439 ASSAY OF FREE THYROXINE: CPT

## 2020-09-11 PROCEDURE — 84443 ASSAY THYROID STIM HORMONE: CPT

## 2020-09-11 PROCEDURE — 36415 COLL VENOUS BLD VENIPUNCTURE: CPT

## 2020-09-11 RX ORDER — AMLODIPINE BESYLATE 5 MG/1
TABLET ORAL
Qty: 180 TABLET | Refills: 3 | Status: SHIPPED | OUTPATIENT
Start: 2020-09-11 | End: 2021-01-20 | Stop reason: SDUPTHER

## 2020-09-16 DIAGNOSIS — E03.9 HYPOTHYROIDISM, UNSPECIFIED TYPE: ICD-10-CM

## 2020-09-16 DIAGNOSIS — I10 ESSENTIAL HYPERTENSION: ICD-10-CM

## 2020-09-16 DIAGNOSIS — G47.00 INSOMNIA, UNSPECIFIED TYPE: ICD-10-CM

## 2020-09-16 DIAGNOSIS — D32.9 BENIGN NEOPLASM OF MENINGES, UNSPECIFIED (HCC): ICD-10-CM

## 2020-09-16 DIAGNOSIS — E03.9 ACQUIRED HYPOTHYROIDISM: ICD-10-CM

## 2020-09-16 RX ORDER — ZOLPIDEM TARTRATE 5 MG/1
5 TABLET ORAL DAILY
Qty: 30 TABLET | Refills: 3 | Status: SHIPPED | OUTPATIENT
Start: 2020-09-16 | End: 2021-01-20 | Stop reason: SDUPTHER

## 2020-09-16 NOTE — TELEPHONE ENCOUNTER
Patients  is requesting patient to have annual blood work ordered   Patient will have this done by next visit

## 2020-09-21 ENCOUNTER — TELEPHONE (OUTPATIENT)
Dept: FAMILY MEDICINE CLINIC | Facility: CLINIC | Age: 80
End: 2020-09-21

## 2020-09-21 DIAGNOSIS — E78.5 HYPERLIPIDEMIA, UNSPECIFIED HYPERLIPIDEMIA TYPE: ICD-10-CM

## 2020-09-21 DIAGNOSIS — E03.9 ACQUIRED HYPOTHYROIDISM: Primary | ICD-10-CM

## 2020-09-21 NOTE — TELEPHONE ENCOUNTER
Patient's  called and would like to have a script for labs placed so that she can have them done before her next visit

## 2020-11-13 DIAGNOSIS — G47.00 INSOMNIA, UNSPECIFIED TYPE: ICD-10-CM

## 2020-11-13 DIAGNOSIS — I10 ESSENTIAL HYPERTENSION: ICD-10-CM

## 2020-11-13 DIAGNOSIS — E03.9 HYPOTHYROIDISM, UNSPECIFIED TYPE: ICD-10-CM

## 2020-11-13 DIAGNOSIS — E03.9 ACQUIRED HYPOTHYROIDISM: ICD-10-CM

## 2020-11-13 RX ORDER — LEVOTHYROXINE SODIUM 50 MCG
TABLET ORAL
Qty: 113 TABLET | Refills: 3 | Status: SHIPPED | OUTPATIENT
Start: 2020-11-13 | End: 2021-01-20 | Stop reason: SDUPTHER

## 2020-11-30 PROCEDURE — 88305 TISSUE EXAM BY PATHOLOGIST: CPT | Performed by: PATHOLOGY

## 2020-12-01 ENCOUNTER — LAB REQUISITION (OUTPATIENT)
Dept: LAB | Facility: HOSPITAL | Age: 80
End: 2020-12-01
Payer: MEDICARE

## 2020-12-01 DIAGNOSIS — K31.89 OTHER DISEASES OF STOMACH AND DUODENUM: ICD-10-CM

## 2020-12-01 DIAGNOSIS — B96.81 HELICOBACTER PYLORI (H. PYLORI) AS THE CAUSE OF DISEASES CLASSIFIED ELSEWHERE: ICD-10-CM

## 2020-12-01 DIAGNOSIS — K22.89 OTHER SPECIFIED DISEASES OF ESOPHAGUS: ICD-10-CM

## 2020-12-01 DIAGNOSIS — K63.5 POLYP OF COLON: ICD-10-CM

## 2020-12-01 DIAGNOSIS — Z80.0 FAMILY HISTORY OF MALIGNANT NEOPLASM OF DIGESTIVE ORGANS: ICD-10-CM

## 2020-12-01 DIAGNOSIS — Z86.010 PERSONAL HISTORY OF COLONIC POLYPS: ICD-10-CM

## 2020-12-01 DIAGNOSIS — K29.50 UNSPECIFIED CHRONIC GASTRITIS WITHOUT BLEEDING: ICD-10-CM

## 2021-01-20 ENCOUNTER — OFFICE VISIT (OUTPATIENT)
Dept: INTERNAL MEDICINE CLINIC | Facility: CLINIC | Age: 81
End: 2021-01-20
Payer: MEDICARE

## 2021-01-20 VITALS
HEIGHT: 62 IN | HEART RATE: 79 BPM | OXYGEN SATURATION: 98 % | BODY MASS INDEX: 25.21 KG/M2 | DIASTOLIC BLOOD PRESSURE: 72 MMHG | SYSTOLIC BLOOD PRESSURE: 128 MMHG | TEMPERATURE: 96.9 F | WEIGHT: 137 LBS

## 2021-01-20 DIAGNOSIS — Z79.899 ENCOUNTER FOR LONG-TERM CURRENT USE OF MEDICATION: ICD-10-CM

## 2021-01-20 DIAGNOSIS — R39.9 URINARY SYMPTOM OR SIGN: ICD-10-CM

## 2021-01-20 DIAGNOSIS — Z71.9 HEALTH COUNSELING: ICD-10-CM

## 2021-01-20 DIAGNOSIS — Z23 ENCOUNTER FOR IMMUNIZATION: ICD-10-CM

## 2021-01-20 DIAGNOSIS — E03.9 ACQUIRED HYPOTHYROIDISM: Primary | ICD-10-CM

## 2021-01-20 DIAGNOSIS — G47.00 INSOMNIA, UNSPECIFIED TYPE: ICD-10-CM

## 2021-01-20 DIAGNOSIS — R20.9 COLD HANDS: ICD-10-CM

## 2021-01-20 DIAGNOSIS — L71.9 ROSACEA: ICD-10-CM

## 2021-01-20 DIAGNOSIS — D32.9 BENIGN NEOPLASM OF MENINGES, UNSPECIFIED (HCC): ICD-10-CM

## 2021-01-20 DIAGNOSIS — Z78.0 POSTMENOPAUSAL: ICD-10-CM

## 2021-01-20 DIAGNOSIS — G47.09 OTHER INSOMNIA: ICD-10-CM

## 2021-01-20 DIAGNOSIS — I10 ESSENTIAL HYPERTENSION: ICD-10-CM

## 2021-01-20 DIAGNOSIS — R73.01 ABNORMAL FASTING GLUCOSE: ICD-10-CM

## 2021-01-20 DIAGNOSIS — E03.9 HYPOTHYROIDISM, UNSPECIFIED TYPE: ICD-10-CM

## 2021-01-20 DIAGNOSIS — K29.50 CHRONIC GASTRITIS, PRESENCE OF BLEEDING UNSPECIFIED, UNSPECIFIED GASTRITIS TYPE: ICD-10-CM

## 2021-01-20 DIAGNOSIS — D42.9 MENINGIOMA, MULTIPLE (HCC): ICD-10-CM

## 2021-01-20 PROBLEM — K57.30 DIVERTICULAR DISEASE OF COLON: Status: ACTIVE | Noted: 2020-11-19

## 2021-01-20 PROBLEM — B96.81 HELICOBACTER PYLORI GASTRITIS: Status: ACTIVE | Noted: 2017-10-19

## 2021-01-20 PROBLEM — K64.9 HEMORRHOIDS: Status: ACTIVE | Noted: 2020-11-19

## 2021-01-20 PROBLEM — K29.70 HELICOBACTER PYLORI GASTRITIS: Status: ACTIVE | Noted: 2017-10-19

## 2021-01-20 PROCEDURE — 99214 OFFICE O/P EST MOD 30 MIN: CPT | Performed by: INTERNAL MEDICINE

## 2021-01-20 RX ORDER — CALCIUM LACTATE 100 MG
600 TABLET ORAL DAILY PRN
Qty: 30 TABLET | Refills: 0
Start: 2021-01-20

## 2021-01-20 RX ORDER — LEVOTHYROXINE SODIUM 50 MCG
TABLET ORAL
Qty: 113 TABLET | Refills: 0 | Status: SHIPPED | OUTPATIENT
Start: 2021-01-20 | End: 2021-03-24

## 2021-01-20 RX ORDER — METRONIDAZOLE 7.5 MG/G
GEL TOPICAL 2 TIMES DAILY
Qty: 135 G | Refills: 1 | Status: SHIPPED | OUTPATIENT
Start: 2021-01-20 | End: 2022-05-16

## 2021-01-20 RX ORDER — ZOLPIDEM TARTRATE 5 MG/1
5 TABLET ORAL DAILY
Qty: 30 TABLET | Refills: 1 | Status: SHIPPED | OUTPATIENT
Start: 2021-01-20 | End: 2022-02-14 | Stop reason: SDUPTHER

## 2021-01-20 RX ORDER — AMLODIPINE BESYLATE 5 MG/1
5 TABLET ORAL 2 TIMES DAILY
Qty: 180 TABLET | Refills: 1 | Status: SHIPPED | OUTPATIENT
Start: 2021-01-20 | End: 2021-05-26

## 2021-01-20 NOTE — PROGRESS NOTES
Assessment/Plan:    Essential hypertension  BP stable, on amlodipine  Acquired hypothyroidism  Repeat TFTs  Taking medication appropriately  Cold hands  Intermittent symptoms with chills, not consistent with Raynaud's  Refer to Endo  Meningioma, multiple ([facility] [address] )  Yearly MRI, due 7/21  Sees [facility] neurology  Rosacea  Uses metronidazole prn  Other insomnia  Takes zolpidem 2 5 mg qHS  Diagnoses and all orders for this visit:    Acquired hypothyroidism  -     Ambulatory referral to Endocrinology; Future  -     TSH, 3rd generation with Free T4 reflex  -     T4, free  -     Synthroid 50 MCG tablet; TAKE 1 AND 1/4 TABLETS BY  MOUTH DAILY  -     zolpidem (AMBIEN) 5 mg tablet; Take 1 tablet (5 mg total) by mouth daily  -     metroNIDAZOLE (METROGEL) 0 75 % gel; Apply topically 2 (two) times a day    Cold hands  -     Ambulatory referral to Endocrinology; Future    Essential hypertension  -     CBC and differential  -     Comprehensive metabolic panel  -     Lipid panel  -     Synthroid 50 MCG tablet; TAKE 1 AND 1/4 TABLETS BY  MOUTH DAILY  -     amLODIPine (NORVASC) 5 mg tablet; Take 1 tablet (5 mg total) by mouth 2 (two) times a day  -     zolpidem (AMBIEN) 5 mg tablet; Take 1 tablet (5 mg total) by mouth daily  -     metroNIDAZOLE (METROGEL) 0 75 % gel; Apply topically 2 (two) times a day    Hypothyroidism, unspecified type  -     Synthroid 50 MCG tablet; TAKE 1 AND 1/4 TABLETS BY  MOUTH DAILY  -     zolpidem (AMBIEN) 5 mg tablet; Take 1 tablet (5 mg total) by mouth daily  -     metroNIDAZOLE (METROGEL) 0 75 % gel; Apply topically 2 (two) times a day    Insomnia, unspecified type  -     Synthroid 50 MCG tablet; TAKE 1 AND 1/4 TABLETS BY  MOUTH DAILY  -     zolpidem (AMBIEN) 5 mg tablet; Take 1 tablet (5 mg total) by mouth daily  -     metroNIDAZOLE (METROGEL) 0 75 % gel; Apply topically 2 (two) times a day    Benign neoplasm of meninges, unspecified (HCC)  -     zolpidem (AMBIEN) 5 mg tablet;  Take 1 tablet (5 mg total) by mouth daily  -     metroNIDAZOLE (METROGEL) 0 75 % gel; Apply topically 2 (two) times a day    Meningioma, multiple (HCC)    Other insomnia    Abnormal fasting glucose  -     Hemoglobin A1C    Encounter for long-term current use of medication  -     Vitamin B12    Postmenopausal  -     DXA bone density spine hip and pelvis; Future    Urinary symptom or sign  -     UA w Reflex to Microscopic w Reflex to Culture    Rosacea    Health counseling  Comments:  Declined vaccinations due to side effects in the past   Bone density due  Declined mammogram this year  Chronic gastritis, presence of bleeding unspecified, unspecified gastritis type  -     Betaine HCl 300 MG TABS; Take 2 tablets (600 mg total) by mouth daily as needed (indigestion)  -     pancrelipase, Lip-Prot-Amyl, (CREON) 24,000 units; Take 24,000 units of lipase by mouth daily as needed for snacks    Other orders  -     Cancel: Vitamin D 25 hydroxy      Follow up in 6 months or as needed  Subjective:      Patient ID: Charanjit Mancilla is a 68 y o  female here to establish care  She feels well, no new complaints  She reports developing cold hands after she eats  This would subside after a few minutes  For several months (?years), she has experienced intermittent chills  It would occur anytime of the year, would wear a winter coat in the summer sometimes  Symptoms would last less than a day, no other symptoms   is asking if this is related to her thyroid  Denies any dry skin, constipations, memory issues  She takes Ambien 2 5 mg every night to help her sleep  The following portions of the patient's history were reviewed and updated as appropriate: allergies, current medications, past family history, past medical history, past social history, past surgical history and problem list     Review of Systems   Constitutional: Negative for appetite change and fatigue     HENT: Negative for congestion, ear pain and postnasal drip     Eyes: Negative for visual disturbance  Respiratory: Negative for cough and shortness of breath  Cardiovascular: Negative for chest pain and leg swelling  Gastrointestinal: Negative for abdominal pain, constipation and diarrhea  Genitourinary: Negative for dysuria, frequency and urgency  Musculoskeletal: Negative for arthralgias and myalgias  Skin: Negative for rash and wound  Neurological: Negative for dizziness, numbness and headaches  Hematological: Does not bruise/bleed easily  Psychiatric/Behavioral: Negative for confusion  The patient is not nervous/anxious  Objective:      /72   Pulse 79   Temp (!) 96 9 °F (36 1 °C)   Ht 5' 2" (1 575 m)   Wt 62 1 kg (137 lb)   SpO2 98%   BMI 25 06 kg/m²          Physical Exam  Vitals signs and nursing note reviewed  Constitutional:       General: She is not in acute distress  Appearance: She is well-developed  HENT:      Head: Normocephalic and atraumatic  Right Ear: Tympanic membrane, ear canal and external ear normal       Left Ear: Tympanic membrane, ear canal and external ear normal    Eyes:      Pupils: Pupils are equal, round, and reactive to light  Cardiovascular:      Rate and Rhythm: Normal rate and regular rhythm  Heart sounds: Normal heart sounds  Pulmonary:      Effort: Pulmonary effort is normal       Breath sounds: Normal breath sounds  No wheezing  Abdominal:      General: Bowel sounds are normal       Palpations: Abdomen is soft  Skin:     General: Skin is warm  Findings: No rash  Neurological:      General: No focal deficit present  Mental Status: She is alert and oriented to person, place, and time  Psychiatric:         Mood and Affect: Mood normal          Behavior: Behavior normal          Reviewed available records  BMI Counseling: Body mass index is 25 06 kg/m²   The BMI is above normal  Exercise recommendations include exercising 3-5 times per week and strength training exercises

## 2021-01-25 ENCOUNTER — IMMUNIZATIONS (OUTPATIENT)
Dept: FAMILY MEDICINE CLINIC | Facility: HOSPITAL | Age: 81
End: 2021-01-25

## 2021-01-25 DIAGNOSIS — Z23 ENCOUNTER FOR IMMUNIZATION: Primary | ICD-10-CM

## 2021-01-25 PROCEDURE — 0011A SARS-COV-2 / COVID-19 MRNA VACCINE (MODERNA) 100 MCG: CPT

## 2021-01-25 PROCEDURE — 91301 SARS-COV-2 / COVID-19 MRNA VACCINE (MODERNA) 100 MCG: CPT

## 2021-02-22 ENCOUNTER — IMMUNIZATIONS (OUTPATIENT)
Dept: FAMILY MEDICINE CLINIC | Facility: HOSPITAL | Age: 81
End: 2021-02-22

## 2021-02-22 DIAGNOSIS — Z23 ENCOUNTER FOR IMMUNIZATION: Primary | ICD-10-CM

## 2021-02-22 PROCEDURE — 0012A SARS-COV-2 / COVID-19 MRNA VACCINE (MODERNA) 100 MCG: CPT

## 2021-02-22 PROCEDURE — 91301 SARS-COV-2 / COVID-19 MRNA VACCINE (MODERNA) 100 MCG: CPT

## 2021-02-26 ENCOUNTER — TELEPHONE (OUTPATIENT)
Dept: INTERNAL MEDICINE CLINIC | Facility: CLINIC | Age: 81
End: 2021-02-26

## 2021-02-26 ENCOUNTER — LAB (OUTPATIENT)
Dept: LAB | Facility: CLINIC | Age: 81
End: 2021-02-26
Payer: MEDICARE

## 2021-02-26 ENCOUNTER — TRANSCRIBE ORDERS (OUTPATIENT)
Dept: LAB | Facility: CLINIC | Age: 81
End: 2021-02-26

## 2021-02-26 DIAGNOSIS — E78.5 HYPERLIPIDEMIA, UNSPECIFIED HYPERLIPIDEMIA TYPE: ICD-10-CM

## 2021-02-26 DIAGNOSIS — E03.9 ACQUIRED HYPOTHYROIDISM: ICD-10-CM

## 2021-02-26 LAB
ALBUMIN SERPL BCP-MCNC: 3.4 G/DL (ref 3.5–5)
ALP SERPL-CCNC: 86 U/L (ref 46–116)
ALT SERPL W P-5'-P-CCNC: 74 U/L (ref 12–78)
ANION GAP SERPL CALCULATED.3IONS-SCNC: 11 MMOL/L (ref 4–13)
AST SERPL W P-5'-P-CCNC: 30 U/L (ref 5–45)
BACTERIA UR QL AUTO: ABNORMAL /HPF
BASOPHILS # BLD AUTO: 0.08 THOUSANDS/ΜL (ref 0–0.1)
BASOPHILS NFR BLD AUTO: 2 % (ref 0–1)
BILIRUB SERPL-MCNC: 0.27 MG/DL (ref 0.2–1)
BILIRUB UR QL STRIP: NEGATIVE
BUN SERPL-MCNC: 19 MG/DL (ref 5–25)
CALCIUM ALBUM COR SERPL-MCNC: 9.2 MG/DL (ref 8.3–10.1)
CALCIUM SERPL-MCNC: 8.7 MG/DL (ref 8.3–10.1)
CHLORIDE SERPL-SCNC: 106 MMOL/L (ref 100–108)
CHOLEST SERPL-MCNC: 212 MG/DL (ref 50–200)
CLARITY UR: ABNORMAL
CO2 SERPL-SCNC: 24 MMOL/L (ref 21–32)
COLOR UR: YELLOW
CREAT SERPL-MCNC: 0.66 MG/DL (ref 0.6–1.3)
EOSINOPHIL # BLD AUTO: 0.24 THOUSAND/ΜL (ref 0–0.61)
EOSINOPHIL NFR BLD AUTO: 5 % (ref 0–6)
ERYTHROCYTE [DISTWIDTH] IN BLOOD BY AUTOMATED COUNT: 14.2 % (ref 11.6–15.1)
EST. AVERAGE GLUCOSE BLD GHB EST-MCNC: 117 MG/DL
GFR SERPL CREATININE-BSD FRML MDRD: 84 ML/MIN/1.73SQ M
GLUCOSE P FAST SERPL-MCNC: 95 MG/DL (ref 65–99)
GLUCOSE UR STRIP-MCNC: NEGATIVE MG/DL
HBA1C MFR BLD: 5.7 %
HCT VFR BLD AUTO: 38.2 % (ref 34.8–46.1)
HDLC SERPL-MCNC: 60 MG/DL
HGB BLD-MCNC: 12.3 G/DL (ref 11.5–15.4)
HGB UR QL STRIP.AUTO: ABNORMAL
IMM GRANULOCYTES # BLD AUTO: 0.01 THOUSAND/UL (ref 0–0.2)
IMM GRANULOCYTES NFR BLD AUTO: 0 % (ref 0–2)
KETONES UR STRIP-MCNC: NEGATIVE MG/DL
LDLC SERPL CALC-MCNC: 126 MG/DL (ref 0–100)
LEUKOCYTE ESTERASE UR QL STRIP: ABNORMAL
LYMPHOCYTES # BLD AUTO: 2.41 THOUSANDS/ΜL (ref 0.6–4.47)
LYMPHOCYTES NFR BLD AUTO: 45 % (ref 14–44)
MCH RBC QN AUTO: 28.7 PG (ref 26.8–34.3)
MCHC RBC AUTO-ENTMCNC: 32.2 G/DL (ref 31.4–37.4)
MCV RBC AUTO: 89 FL (ref 82–98)
MONOCYTES # BLD AUTO: 0.43 THOUSAND/ΜL (ref 0.17–1.22)
MONOCYTES NFR BLD AUTO: 8 % (ref 4–12)
NEUTROPHILS # BLD AUTO: 2.11 THOUSANDS/ΜL (ref 1.85–7.62)
NEUTS SEG NFR BLD AUTO: 40 % (ref 43–75)
NITRITE UR QL STRIP: POSITIVE
NON-SQ EPI CELLS URNS QL MICRO: ABNORMAL /HPF
NRBC BLD AUTO-RTO: 0 /100 WBCS
PH UR STRIP.AUTO: 7 [PH]
PLATELET # BLD AUTO: 262 THOUSANDS/UL (ref 149–390)
PMV BLD AUTO: 9.7 FL (ref 8.9–12.7)
POTASSIUM SERPL-SCNC: 3.7 MMOL/L (ref 3.5–5.3)
PROT SERPL-MCNC: 7.2 G/DL (ref 6.4–8.2)
PROT UR STRIP-MCNC: NEGATIVE MG/DL
RBC # BLD AUTO: 4.28 MILLION/UL (ref 3.81–5.12)
RBC #/AREA URNS AUTO: ABNORMAL /HPF
SODIUM SERPL-SCNC: 141 MMOL/L (ref 136–145)
SP GR UR STRIP.AUTO: 1.02 (ref 1–1.03)
T4 FREE SERPL-MCNC: 1.15 NG/DL (ref 0.76–1.46)
TRIGL SERPL-MCNC: 132 MG/DL
TSH SERPL DL<=0.05 MIU/L-ACNC: 1.5 UIU/ML (ref 0.36–3.74)
UROBILINOGEN UR QL STRIP.AUTO: 0.2 E.U./DL
VIT B12 SERPL-MCNC: 758 PG/ML (ref 100–900)
WBC # BLD AUTO: 5.28 THOUSAND/UL (ref 4.31–10.16)
WBC #/AREA URNS AUTO: ABNORMAL /HPF

## 2021-02-26 PROCEDURE — 36415 COLL VENOUS BLD VENIPUNCTURE: CPT | Performed by: INTERNAL MEDICINE

## 2021-02-26 PROCEDURE — 85025 COMPLETE CBC W/AUTO DIFF WBC: CPT | Performed by: INTERNAL MEDICINE

## 2021-02-26 PROCEDURE — 83036 HEMOGLOBIN GLYCOSYLATED A1C: CPT | Performed by: INTERNAL MEDICINE

## 2021-02-26 PROCEDURE — 82607 VITAMIN B-12: CPT | Performed by: INTERNAL MEDICINE

## 2021-02-26 PROCEDURE — 84439 ASSAY OF FREE THYROXINE: CPT | Performed by: INTERNAL MEDICINE

## 2021-02-26 PROCEDURE — 80053 COMPREHEN METABOLIC PANEL: CPT | Performed by: INTERNAL MEDICINE

## 2021-02-26 PROCEDURE — 81001 URINALYSIS AUTO W/SCOPE: CPT

## 2021-02-26 PROCEDURE — 80061 LIPID PANEL: CPT

## 2021-02-26 PROCEDURE — 84443 ASSAY THYROID STIM HORMONE: CPT | Performed by: INTERNAL MEDICINE

## 2021-02-26 NOTE — TELEPHONE ENCOUNTER
Lab results:    Urine showed infection  Are you having any pain or discomfort? If not, no need for antibiotics  Cholesterol is higher than previous  Sugars are in prediabetic range    The rest of your labs were normal

## 2021-03-24 DIAGNOSIS — E03.9 ACQUIRED HYPOTHYROIDISM: ICD-10-CM

## 2021-03-24 DIAGNOSIS — E03.9 HYPOTHYROIDISM, UNSPECIFIED TYPE: ICD-10-CM

## 2021-03-24 DIAGNOSIS — I10 ESSENTIAL HYPERTENSION: ICD-10-CM

## 2021-03-24 DIAGNOSIS — G47.00 INSOMNIA, UNSPECIFIED TYPE: ICD-10-CM

## 2021-03-24 RX ORDER — LEVOTHYROXINE SODIUM 50 MCG
TABLET ORAL
Qty: 113 TABLET | Refills: 1 | Status: SHIPPED | OUTPATIENT
Start: 2021-03-24 | End: 2021-05-13 | Stop reason: SDUPTHER

## 2021-05-13 ENCOUNTER — CONSULT (OUTPATIENT)
Dept: ENDOCRINOLOGY | Facility: CLINIC | Age: 81
End: 2021-05-13
Payer: MEDICARE

## 2021-05-13 VITALS
DIASTOLIC BLOOD PRESSURE: 66 MMHG | SYSTOLIC BLOOD PRESSURE: 136 MMHG | BODY MASS INDEX: 24.91 KG/M2 | TEMPERATURE: 97.5 F | HEART RATE: 62 BPM | WEIGHT: 136.2 LBS

## 2021-05-13 DIAGNOSIS — I10 ESSENTIAL HYPERTENSION: ICD-10-CM

## 2021-05-13 DIAGNOSIS — E03.9 ACQUIRED HYPOTHYROIDISM: Primary | ICD-10-CM

## 2021-05-13 DIAGNOSIS — E03.9 HYPOTHYROIDISM, UNSPECIFIED TYPE: ICD-10-CM

## 2021-05-13 DIAGNOSIS — R20.9 COLD HANDS: ICD-10-CM

## 2021-05-13 DIAGNOSIS — R68.89 COLD INTOLERANCE: ICD-10-CM

## 2021-05-13 DIAGNOSIS — G47.00 INSOMNIA, UNSPECIFIED TYPE: ICD-10-CM

## 2021-05-13 PROCEDURE — 1123F ACP DISCUSS/DSCN MKR DOCD: CPT | Performed by: INTERNAL MEDICINE

## 2021-05-13 PROCEDURE — 99204 OFFICE O/P NEW MOD 45 MIN: CPT | Performed by: INTERNAL MEDICINE

## 2021-05-13 RX ORDER — LEVOTHYROXINE SODIUM 50 MCG
TABLET ORAL
Qty: 120 TABLET | Refills: 1 | Status: SHIPPED | OUTPATIENT
Start: 2021-05-13 | End: 2021-08-23 | Stop reason: SDUPTHER

## 2021-05-13 NOTE — PROGRESS NOTES
Elen Riley 80 y o  female MRN: [id number]    Encounter: [id number]      Assessment/Plan     Assessment: This is a 80y o -year-old female with hypothyroidism  Plan:    Diagnoses and all orders for this visit:    Acquired hypothyroidism  Lab Results   Component Value Date    [id number] 1 496 02/26/2021    Last TSH is 1 4    Patient is currently taking 1 and  1/ 4 tablet daily, discussed to switch to Synthroid 50 mcg 1 tablet daily from Monday through Friday and take 2 tablets on Saturday Sunday  discussed to check  TSH, free T4 in 6 weeks  educated to take Synthroid on empty stomach 1 hour before breakfast and 4 hour apart from vitamin supplementation       -     Ambulatory referral to Endocrinology  -     Synthroid 50 MCG tablet; Take one tablet daily on empty stomach except on Saturday and Sunday take 2 tablet  -     T4, free; Future  -     TSH, 3rd generation; Future  -     TSH, 3rd generation; Future  -     T4, free; Future    Cold hands  -     Ambulatory referral to Endocrinology    Hypothyroidism, unspecified type  -     Synthroid 50 MCG tablet; Take one tablet daily on empty stomach except on Saturday and Sunday take 2 tablet    Essential hypertension   Blood pressure is slightly elevated  Discussed to follow up with PCP, continue current management  -     Synthroid 50 MCG tablet; Take one tablet daily on empty stomach except on Saturday and Sunday take 2 tablet    Cold intolerance   will obtain a m  cortisol, to ensure cortisol is okay  -     Cortisol Level, AM Specimen- Lab Collect; Future        CC:   Hypothyroidism     History of Present Illness     HPI:    Elen Riley Is 42-year-old woman with medical history of hypothyroidism for many years managed on levothyroxine, prediabetes is here for evaluation and management of hypothyroidism  she currently takes  Brand Synthroid 50 mcg daily on empty stomach 1 hour before breakfast    she is accompanied by her      she is also concerned about cold hands and cold intolerance  she denies dizziness, syncopal or presyncopal episodes  she has history of pancreatic insufficiency and currently taking pancrelipase  she also takes Norvasc 5 mg twice daily for hypertension  she is on zolpidem 5 mg daily  Results for Wilder Done (MRN [id number]) as of 5/13/2021 09:04   Ref  Range 2/26/2021 07:09   Sodium Latest Ref Range: 136 - 145 mmol/L 141   Potassium Latest Ref Range: 3 5 - 5 3 mmol/L 3 7   Chloride Latest Ref Range: 100 - 108 mmol/L 106   CO2 Latest Ref Range: 21 - 32 mmol/L 24   Anion Gap Latest Ref Range: 4 - 13 mmol/L 11   BUN Latest Ref Range: 5 - 25 mg/dL 19   Creatinine Latest Ref Range: 0 60 - 1 30 mg/dL 0 66   GLUCOSE FASTING Latest Ref Range: 65 - 99 mg/dL 95   Calcium Latest Ref Range: 8 3 - 10 1 mg/dL 8 7   CORRECTED CALCIUM Latest Ref Range: 8 3 - 10 1 mg/dL 9 2   AST Latest Ref Range: 5 - 45 U/L 30   ALT Latest Ref Range: 12 - 78 U/L 74   Alkaline Phosphatase Latest Ref Range: 46 - 116 U/L 86   Total Protein Latest Ref Range: 6 4 - 8 2 g/dL 7 2   Albumin Latest Ref Range: 3 5 - 5 0 g/dL 3 4 (L)   TOTAL BILIRUBIN Latest Ref Range: 0 20 - 1 00 mg/dL 0 27   eGFR Latest Units: ml/min/1 73sq m 84   Cholesterol Latest Ref Range: 50 - 200 mg/dL 212 (H)   Triglycerides Latest Ref Range: <=150 mg/dL 132   HDL Latest Ref Range: >=40 mg/dL 60   LDL Calculated Latest Ref Range: 0 - 100 mg/dL 126 (H)   Vitamin B-12 Latest Ref Range: 100 - 900 pg/mL 758     Results for Wilder Done (MRN [id number]) as of 5/13/2021 09:04   Ref  Range 2/26/2021 07:09   TSH 3RD GENERATON Latest Ref Range: 0 358 - 3 740 uIU/mL 1 496   Free T4 Latest Ref Range: 0 76 - 1 46 ng/dL 1 15     Review of Systems   Constitutional: Positive for chills  Negative for activity change, appetite change, fatigue, fever and unexpected weight change  HENT: Negative  Eyes: Negative for visual disturbance  Respiratory: Negative      Cardiovascular: Negative  Gastrointestinal: Negative  Endocrine: Negative for polydipsia, polyphagia and polyuria  Genitourinary: Negative  Musculoskeletal: Negative  Neurological: Negative  Hematological: Negative  Psychiatric/Behavioral: The patient is nervous/anxious          Historical Information   Past Medical History:   Diagnosis Date    Disease of thyroid gland     GERD (gastroesophageal reflux disease)     Shoulder impingement      Past Surgical History:   Procedure Laterality Date    APPENDECTOMY  1993    FEMUR SURGERY Right     HYSTERECTOMY  1993    TAHBSO    OOPHORECTOMY       Social History   Social History     Substance and Sexual Activity   Alcohol Use No     Social History     Substance and Sexual Activity   Drug Use No     Social History     Tobacco Use   Smoking Status Never Smoker   Smokeless Tobacco Never Used   Tobacco Comment    no passive smoke exposure     Family History:   Family History   Problem Relation Age of Onset    Breast cancer Family     Colon cancer Family     Stroke Family     Hypertension Family     Colon cancer Mother 80    Breast cancer Sister 44    Pancreatic cancer Sister     Hypertension Father     Stroke Father     Alcohol abuse Neg Hx     Substance Abuse Neg Hx     Mental illness Neg Hx     Depression Neg Hx        Meds/Allergies   Current Outpatient Medications   Medication Sig Dispense Refill    amLODIPine (NORVASC) 5 mg tablet Take 1 tablet (5 mg total) by mouth 2 (two) times a day 180 tablet 1    B Complex Vitamins (VITAMIN-B COMPLEX PO) Take 1 tablet by mouth      Betaine HCl 300 MG TABS Take 2 tablets (600 mg total) by mouth daily as needed (indigestion) 30 tablet 0    metroNIDAZOLE (METROGEL) 0 75 % gel Apply topically 2 (two) times a day 135 g 1    pancrelipase, Lip-Prot-Amyl, (CREON) 24,000 units Take 24,000 units of lipase by mouth daily as needed for snacks 30 capsule 0    Synthroid 50 MCG tablet Take one tablet daily on empty stomach except on Saturday and Sunday take 2 tablet 120 tablet 1    zolpidem (AMBIEN) 5 mg tablet Take 1 tablet (5 mg total) by mouth daily 30 tablet 1     No current facility-administered medications for this visit  Allergies   Allergen Reactions    No Active Allergies        Objective   Vitals: Blood pressure 136/66, pulse 62, temperature 97 5 °F (36 4 °C), weight 61 8 kg (136 lb 3 2 oz)  Physical Exam    The history was obtained from the review of the chart, patient  Lab Results:   Lab Results   Component Value Date/Time    TSH 3RD GENERATON 1 496 02/26/2021 07:09 AM    TSH 3RD GENERATON 0 695 09/11/2020 11:02 AM    TSH 3RD GENERATON 0 217 (L) 07/30/2020 07:28 AM    Free T4 1 15 02/26/2021 07:09 AM    Free T4 1 33 09/11/2020 11:02 AM    Free T4 1 38 07/30/2020 07:28 AM       Imaging Studies:        I have personally reviewed pertinent reports  Portions of the record may have been created with voice recognition software  Occasional wrong word or "sound a like" substitutions may have occurred due to the inherent limitations of voice recognition software  Read the chart carefully and recognize, using context, where substitutions have occurred

## 2021-05-20 ENCOUNTER — LAB (OUTPATIENT)
Dept: LAB | Facility: CLINIC | Age: 81
End: 2021-05-20
Payer: MEDICARE

## 2021-05-20 DIAGNOSIS — R68.89 COLD INTOLERANCE: ICD-10-CM

## 2021-05-20 LAB — CORTIS AM PEAK SERPL-MCNC: 18.9 UG/DL (ref 4.2–22.4)

## 2021-05-20 PROCEDURE — 36415 COLL VENOUS BLD VENIPUNCTURE: CPT

## 2021-05-20 PROCEDURE — 82533 TOTAL CORTISOL: CPT

## 2021-05-20 NOTE — RESULT ENCOUNTER NOTE
Hi your , Cortisol is normal  Cold intolerance not related to Endocrine problems, thyroid blood work is also within normal range  Please discuss with PCP to rule out other causes    Lab Results       Component                Value               Date                       [id number]             1 496               02/26/2021

## 2021-05-26 DIAGNOSIS — I10 ESSENTIAL HYPERTENSION: ICD-10-CM

## 2021-05-26 RX ORDER — AMLODIPINE BESYLATE 5 MG/1
TABLET ORAL
Qty: 180 TABLET | Refills: 0 | Status: SHIPPED | OUTPATIENT
Start: 2021-05-26 | End: 2021-09-28

## 2021-07-19 ENCOUNTER — LAB (OUTPATIENT)
Dept: LAB | Facility: CLINIC | Age: 81
End: 2021-07-19
Payer: MEDICARE

## 2021-07-19 DIAGNOSIS — E03.9 ACQUIRED HYPOTHYROIDISM: ICD-10-CM

## 2021-07-19 LAB
T4 FREE SERPL-MCNC: 1.14 NG/DL (ref 0.76–1.46)
TSH SERPL DL<=0.05 MIU/L-ACNC: 1.47 UIU/ML (ref 0.36–3.74)

## 2021-07-19 PROCEDURE — 84443 ASSAY THYROID STIM HORMONE: CPT

## 2021-07-19 PROCEDURE — 84439 ASSAY OF FREE THYROXINE: CPT

## 2021-07-19 PROCEDURE — 36415 COLL VENOUS BLD VENIPUNCTURE: CPT

## 2021-07-22 ENCOUNTER — OFFICE VISIT (OUTPATIENT)
Dept: INTERNAL MEDICINE CLINIC | Facility: CLINIC | Age: 81
End: 2021-07-22
Payer: MEDICARE

## 2021-07-22 VITALS
OXYGEN SATURATION: 97 % | WEIGHT: 136 LBS | SYSTOLIC BLOOD PRESSURE: 124 MMHG | TEMPERATURE: 97.3 F | DIASTOLIC BLOOD PRESSURE: 74 MMHG | HEART RATE: 63 BPM | HEIGHT: 62 IN | BODY MASS INDEX: 25.03 KG/M2

## 2021-07-22 DIAGNOSIS — G47.00 INSOMNIA, UNSPECIFIED TYPE: ICD-10-CM

## 2021-07-22 DIAGNOSIS — E03.9 HYPOTHYROIDISM, UNSPECIFIED TYPE: ICD-10-CM

## 2021-07-22 DIAGNOSIS — Z71.9 HEALTH COUNSELING: ICD-10-CM

## 2021-07-22 DIAGNOSIS — I10 ESSENTIAL HYPERTENSION: ICD-10-CM

## 2021-07-22 DIAGNOSIS — K29.50 CHRONIC GASTRITIS, PRESENCE OF BLEEDING UNSPECIFIED, UNSPECIFIED GASTRITIS TYPE: ICD-10-CM

## 2021-07-22 DIAGNOSIS — D32.9 BENIGN NEOPLASM OF MENINGES, UNSPECIFIED (HCC): ICD-10-CM

## 2021-07-22 DIAGNOSIS — D42.9 MENINGIOMA, MULTIPLE (HCC): ICD-10-CM

## 2021-07-22 DIAGNOSIS — E03.9 ACQUIRED HYPOTHYROIDISM: ICD-10-CM

## 2021-07-22 DIAGNOSIS — R73.03 PREDIABETES: ICD-10-CM

## 2021-07-22 DIAGNOSIS — G47.09 OTHER INSOMNIA: Primary | ICD-10-CM

## 2021-07-22 DIAGNOSIS — R39.9 URINARY SYMPTOM OR SIGN: ICD-10-CM

## 2021-07-22 PROCEDURE — 99214 OFFICE O/P EST MOD 30 MIN: CPT | Performed by: INTERNAL MEDICINE

## 2021-07-22 RX ORDER — PHENAZOPYRIDINE HYDROCHLORIDE 200 MG/1
200 TABLET, FILM COATED ORAL 3 TIMES DAILY PRN
Qty: 30 TABLET | Refills: 0 | Status: SHIPPED | OUTPATIENT
Start: 2021-07-22

## 2021-07-22 NOTE — PATIENT INSTRUCTIONS
If with pain when urinating, please give a urine sample  Just submit to the lab, there is a standing order  Schedule appointment for eye exam     Consider prazosin if having true night maxwell  Start walking / exercising daily to help with sleep  What you can do to improve your sleep:   · Create a sleep schedule  Try to go to sleep and wake up at the same times every day  Keep a record of your sleep patterns, and any sleeping problems you have  Bring the record to follow-up visits with healthcare providers  · Do not take naps  Naps could make it hard for you to fall asleep at bedtime  · Keep your bedroom cool, quiet, and dark  Turn on white noise, such as a fan, to help you relax  Do not use your bed for any activity that will keep you awake  Do not read, exercise, eat, or watch TV in your bedroom  · Get up if you do not fall asleep within 20 minutes  Move to another room and do something relaxing until you become sleepy  · Limit caffeine, alcohol, and food to earlier in the day  Only drink caffeine in the morning  Do not drink alcohol within 6 hours of bedtime  Do not eat a heavy meal right before you go to bed  · Exercise regularly  Daily exercise may help you sleep better  Do not exercise within 4 hours of bedtime

## 2021-07-22 NOTE — PROGRESS NOTES
Assessment/Plan:    Essential hypertension  BP controlled, on amlodipine  Other insomnia  Discussed sleep hygiene  She is unable to explain if having true nightmares or vivid dreams  Discussed prazosin, may affect blood pressure  Encouraged to walk / exercise daily  Takes zolpidem 2 5 mg qHS prn only  Meningioma, multiple ([facility] )  MRI due, she will call neurosurgery to schedule  Cold hands  Saw Endo, cortisol normal   Symptoms would occur a few days a month only  Acquired hypothyroidism  Adequately replaced  Would like to see another Endo  Hemorrhoids  No recent issues  Rosacea  No recent rash  Diagnoses and all orders for this visit:    Other insomnia    Essential hypertension  -     CBC and differential; Future  -     Comprehensive metabolic panel; Future  -     Lipid panel; Future    Acquired hypothyroidism    Insomnia, unspecified type    Benign neoplasm of meninges, unspecified (HCC)    Hypothyroidism, unspecified type    Chronic gastritis, presence of bleeding unspecified, unspecified gastritis type    Meningioma, multiple ([facility] )    Urinary symptom or sign  Comments:  Chills may be due to UTIs  Instructed to give sample asap if with symptoms  Given Pyridium to take prn as requested  Orders:  -     phenazopyridine (PYRIDIUM) 200 mg tablet; Take 1 tablet (200 mg total) by mouth 3 (three) times a day as needed for bladder spasms  -     UA w Reflex to Microscopic w Reflex to Culture    Prediabetes  -     Hemoglobin A1C; Future    Health counseling  Comments:  Schedule bone density  Declined futher mammograms  Eye exam due  Follow up in 6 months or as needed  Subjective:      Patient ID: Marta Robledo is a 80 y o  female here for a follow up  She still complains of chills, it would occur a few times a month   reports it is shaking chills  He says she has asymptomatic bacteruria  She reports urinary symptoms about a week ago, cannot recall if she had chills then  She takes Pyridium as needed, requesting for a prescription for this  She complains of sleep issues  She usually goes to bed at midnight  If she is unable to sleep, she will take Ambien, half tablet around 1 am  She reports nightmares most night, unable to explain further  She does dream every night  She has trouble falling asleep  She sleeps several hours at night with or without the Ambien  She does not walk or exercise regularly, remains sedentary at home  She complains of easy bruising, still has a bruise from blood work 3 days ago on her arm   is asking if she needs a coagulation profile  She is not on aspirin, does not take NSAIDs, does take several vitamin supplements  The following portions of the patient's history were reviewed and updated as appropriate: allergies, current medications, past medical history, past social history and problem list     Review of Systems   Constitutional: Negative for activity change, appetite change and fatigue  HENT: Negative for congestion  Eyes: Negative for visual disturbance  Respiratory: Negative for cough and shortness of breath  Cardiovascular: Negative for chest pain and leg swelling  Gastrointestinal: Negative for abdominal pain, constipation and diarrhea  Genitourinary: Negative for dysuria, frequency and urgency  Musculoskeletal: Negative for arthralgias  Skin: Negative for rash and wound  Neurological: Negative for dizziness, numbness and headaches  Hematological: Bruises/bleeds easily  Psychiatric/Behavioral: Positive for sleep disturbance  Negative for agitation and dysphoric mood  The patient is not nervous/anxious  Objective:      /74   Pulse 63   Temp (!) 97 3 °F (36 3 °C)   Ht 5' 2" (1 575 m)   Wt 61 7 kg (136 lb)   SpO2 97%   BMI 24 87 kg/m²          Physical Exam  Vitals and nursing note reviewed  Constitutional:       General: She is not in acute distress       Appearance: She is well-developed  HENT:      Head: Normocephalic and atraumatic  Right Ear: Tympanic membrane, ear canal and external ear normal       Left Ear: Tympanic membrane, ear canal and external ear normal    Eyes:      Pupils: Pupils are equal, round, and reactive to light  Cardiovascular:      Rate and Rhythm: Normal rate and regular rhythm  Heart sounds: Normal heart sounds  Pulmonary:      Effort: Pulmonary effort is normal       Breath sounds: Normal breath sounds  No wheezing  Abdominal:      General: Bowel sounds are normal       Palpations: Abdomen is soft  Musculoskeletal:         General: No swelling  Skin:     General: Skin is warm  Findings: Bruising present  No rash or wound  Neurological:      General: No focal deficit present  Mental Status: She is alert and oriented to person, place, and time  Psychiatric:         Mood and Affect: Mood normal          Behavior: Behavior normal            Lab results reviewed with patient

## 2021-07-22 NOTE — ASSESSMENT & PLAN NOTE
Discussed sleep hygiene  She is unable to explain if having true nightmares or vivid dreams  Discussed prazosin, may affect blood pressure  Encouraged to walk / exercise daily  Takes zolpidem 2 5 mg qHS prn only

## 2021-08-16 ENCOUNTER — HOSPITAL ENCOUNTER (OUTPATIENT)
Dept: MRI IMAGING | Facility: HOSPITAL | Age: 81
Discharge: HOME | End: 2021-08-16
Payer: MEDICARE

## 2021-08-16 DIAGNOSIS — D32.9 MENINGIOMA (HCC): ICD-10-CM

## 2021-08-16 PROCEDURE — 70553 MRI BRAIN STEM W/O & W/DYE: CPT

## 2021-08-16 PROCEDURE — A9585 GADOBUTROL INJECTION: HCPCS | Performed by: RADIOLOGY

## 2021-08-16 RX ADMIN — GADOBUTROL 6 ML: 604.72 INJECTION INTRAVENOUS at 10:08

## 2021-08-23 DIAGNOSIS — E03.9 ACQUIRED HYPOTHYROIDISM: ICD-10-CM

## 2021-08-23 DIAGNOSIS — I10 ESSENTIAL HYPERTENSION: ICD-10-CM

## 2021-08-23 DIAGNOSIS — G47.00 INSOMNIA, UNSPECIFIED TYPE: ICD-10-CM

## 2021-08-23 DIAGNOSIS — E03.9 HYPOTHYROIDISM, UNSPECIFIED TYPE: ICD-10-CM

## 2021-08-23 RX ORDER — LEVOTHYROXINE SODIUM 50 MCG
TABLET ORAL
Qty: 120 TABLET | Refills: 1 | Status: SHIPPED | OUTPATIENT
Start: 2021-08-23 | End: 2022-08-08

## 2021-09-28 DIAGNOSIS — I10 ESSENTIAL HYPERTENSION: ICD-10-CM

## 2021-09-28 RX ORDER — AMLODIPINE BESYLATE 5 MG/1
TABLET ORAL
Qty: 180 TABLET | Refills: 3 | Status: SHIPPED | OUTPATIENT
Start: 2021-09-28

## 2021-10-29 LAB
LEFT EYE DIABETIC RETINOPATHY: NORMAL
RIGHT EYE DIABETIC RETINOPATHY: NORMAL
SEVERITY (EYE EXAM): NORMAL

## 2021-11-15 ENCOUNTER — LAB (OUTPATIENT)
Dept: LAB | Facility: CLINIC | Age: 81
End: 2021-11-15
Payer: MEDICARE

## 2021-11-15 ENCOUNTER — TELEPHONE (OUTPATIENT)
Dept: ENDOCRINOLOGY | Facility: CLINIC | Age: 81
End: 2021-11-15

## 2021-11-15 DIAGNOSIS — E03.9 ACQUIRED HYPOTHYROIDISM: ICD-10-CM

## 2021-11-15 LAB
T4 FREE SERPL-MCNC: 1.21 NG/DL (ref 0.76–1.46)
TSH SERPL DL<=0.05 MIU/L-ACNC: 2.36 UIU/ML (ref 0.36–3.74)

## 2021-11-15 PROCEDURE — 36415 COLL VENOUS BLD VENIPUNCTURE: CPT

## 2021-11-15 PROCEDURE — 84439 ASSAY OF FREE THYROXINE: CPT

## 2021-11-15 PROCEDURE — 84443 ASSAY THYROID STIM HORMONE: CPT

## 2021-11-29 ENCOUNTER — OFFICE VISIT (OUTPATIENT)
Dept: INTERNAL MEDICINE CLINIC | Facility: CLINIC | Age: 81
End: 2021-11-29
Payer: MEDICARE

## 2021-11-29 VITALS
HEART RATE: 65 BPM | WEIGHT: 137 LBS | BODY MASS INDEX: 25.21 KG/M2 | DIASTOLIC BLOOD PRESSURE: 60 MMHG | OXYGEN SATURATION: 98 % | HEIGHT: 62 IN | TEMPERATURE: 97.8 F | SYSTOLIC BLOOD PRESSURE: 122 MMHG

## 2021-11-29 DIAGNOSIS — Z78.0 ENCOUNTER FOR OSTEOPOROSIS SCREENING IN ASYMPTOMATIC POSTMENOPAUSAL PATIENT: ICD-10-CM

## 2021-11-29 DIAGNOSIS — I10 ESSENTIAL HYPERTENSION: ICD-10-CM

## 2021-11-29 DIAGNOSIS — Z13.820 ENCOUNTER FOR OSTEOPOROSIS SCREENING IN ASYMPTOMATIC POSTMENOPAUSAL PATIENT: ICD-10-CM

## 2021-11-29 DIAGNOSIS — T14.8XXA BRUISING: ICD-10-CM

## 2021-11-29 DIAGNOSIS — M81.0 AGE-RELATED OSTEOPOROSIS WITHOUT CURRENT PATHOLOGICAL FRACTURE: ICD-10-CM

## 2021-11-29 DIAGNOSIS — Z12.31 ENCOUNTER FOR SCREENING MAMMOGRAM FOR MALIGNANT NEOPLASM OF BREAST: ICD-10-CM

## 2021-11-29 DIAGNOSIS — D42.9 MENINGIOMA, MULTIPLE (HCC): Primary | ICD-10-CM

## 2021-11-29 DIAGNOSIS — E03.9 ACQUIRED HYPOTHYROIDISM: ICD-10-CM

## 2021-11-29 DIAGNOSIS — G47.09 OTHER INSOMNIA: ICD-10-CM

## 2021-11-29 PROCEDURE — 99214 OFFICE O/P EST MOD 30 MIN: CPT | Performed by: INTERNAL MEDICINE

## 2021-11-30 ENCOUNTER — TELEPHONE (OUTPATIENT)
Dept: ADMINISTRATIVE | Facility: OTHER | Age: 81
End: 2021-11-30

## 2021-12-11 ENCOUNTER — APPOINTMENT (OUTPATIENT)
Dept: LAB | Facility: CLINIC | Age: 81
End: 2021-12-11
Payer: MEDICARE

## 2021-12-11 DIAGNOSIS — I10 ESSENTIAL HYPERTENSION: ICD-10-CM

## 2021-12-11 DIAGNOSIS — T14.8XXA BRUISING: ICD-10-CM

## 2021-12-11 DIAGNOSIS — M81.0 AGE-RELATED OSTEOPOROSIS WITHOUT CURRENT PATHOLOGICAL FRACTURE: ICD-10-CM

## 2021-12-11 DIAGNOSIS — R73.03 PREDIABETES: ICD-10-CM

## 2021-12-11 LAB
25(OH)D3 SERPL-MCNC: 30.8 NG/ML (ref 30–100)
ALBUMIN SERPL BCP-MCNC: 3.9 G/DL (ref 3.5–5)
ALP SERPL-CCNC: 63 U/L (ref 46–116)
ALT SERPL W P-5'-P-CCNC: 34 U/L (ref 12–78)
ANION GAP SERPL CALCULATED.3IONS-SCNC: 9 MMOL/L (ref 4–13)
APTT PPP: 32 SECONDS (ref 23–37)
AST SERPL W P-5'-P-CCNC: 24 U/L (ref 5–45)
BACTERIA UR QL AUTO: ABNORMAL /HPF
BASOPHILS # BLD AUTO: 0.09 THOUSANDS/ΜL (ref 0–0.1)
BASOPHILS NFR BLD AUTO: 1 % (ref 0–1)
BILIRUB SERPL-MCNC: 0.38 MG/DL (ref 0.2–1)
BILIRUB UR QL STRIP: NEGATIVE
BUN SERPL-MCNC: 16 MG/DL (ref 5–25)
CALCIUM SERPL-MCNC: 8.9 MG/DL (ref 8.3–10.1)
CHLORIDE SERPL-SCNC: 103 MMOL/L (ref 100–108)
CHOLEST SERPL-MCNC: 224 MG/DL
CLARITY UR: CLEAR
CO2 SERPL-SCNC: 25 MMOL/L (ref 21–32)
COLOR UR: ABNORMAL
CREAT SERPL-MCNC: 0.77 MG/DL (ref 0.6–1.3)
EOSINOPHIL # BLD AUTO: 0.15 THOUSAND/ΜL (ref 0–0.61)
EOSINOPHIL NFR BLD AUTO: 2 % (ref 0–6)
ERYTHROCYTE [DISTWIDTH] IN BLOOD BY AUTOMATED COUNT: 13.8 % (ref 11.6–15.1)
EST. AVERAGE GLUCOSE BLD GHB EST-MCNC: 117 MG/DL
GFR SERPL CREATININE-BSD FRML MDRD: 74 ML/MIN/1.73SQ M
GLUCOSE P FAST SERPL-MCNC: 91 MG/DL (ref 65–99)
GLUCOSE UR STRIP-MCNC: NEGATIVE MG/DL
HBA1C MFR BLD: 5.7 %
HCT VFR BLD AUTO: 41 % (ref 34.8–46.1)
HDLC SERPL-MCNC: 55 MG/DL
HGB BLD-MCNC: 13 G/DL (ref 11.5–15.4)
HGB UR QL STRIP.AUTO: NEGATIVE
IMM GRANULOCYTES # BLD AUTO: 0.01 THOUSAND/UL (ref 0–0.2)
IMM GRANULOCYTES NFR BLD AUTO: 0 % (ref 0–2)
INR PPP: 0.93 (ref 0.84–1.19)
KETONES UR STRIP-MCNC: NEGATIVE MG/DL
LDLC SERPL CALC-MCNC: 141 MG/DL (ref 0–100)
LEUKOCYTE ESTERASE UR QL STRIP: ABNORMAL
LYMPHOCYTES # BLD AUTO: 3.42 THOUSANDS/ΜL (ref 0.6–4.47)
LYMPHOCYTES NFR BLD AUTO: 53 % (ref 14–44)
MCH RBC QN AUTO: 28.9 PG (ref 26.8–34.3)
MCHC RBC AUTO-ENTMCNC: 31.7 G/DL (ref 31.4–37.4)
MCV RBC AUTO: 91 FL (ref 82–98)
MONOCYTES # BLD AUTO: 0.46 THOUSAND/ΜL (ref 0.17–1.22)
MONOCYTES NFR BLD AUTO: 7 % (ref 4–12)
NEUTROPHILS # BLD AUTO: 2.39 THOUSANDS/ΜL (ref 1.85–7.62)
NEUTS SEG NFR BLD AUTO: 37 % (ref 43–75)
NITRITE UR QL STRIP: NEGATIVE
NON-SQ EPI CELLS URNS QL MICRO: ABNORMAL /HPF
NONHDLC SERPL-MCNC: 169 MG/DL
NRBC BLD AUTO-RTO: 0 /100 WBCS
PH UR STRIP.AUTO: 7.5 [PH]
PLATELET # BLD AUTO: 292 THOUSANDS/UL (ref 149–390)
PMV BLD AUTO: 10.1 FL (ref 8.9–12.7)
POTASSIUM SERPL-SCNC: 3.8 MMOL/L (ref 3.5–5.3)
PROT SERPL-MCNC: 7.8 G/DL (ref 6.4–8.2)
PROT UR STRIP-MCNC: NEGATIVE MG/DL
PROTHROMBIN TIME: 12.5 SECONDS (ref 11.6–14.5)
RBC # BLD AUTO: 4.5 MILLION/UL (ref 3.81–5.12)
RBC #/AREA URNS AUTO: ABNORMAL /HPF
SODIUM SERPL-SCNC: 137 MMOL/L (ref 136–145)
SP GR UR STRIP.AUTO: 1.01 (ref 1–1.03)
TRIGL SERPL-MCNC: 140 MG/DL
UROBILINOGEN UR QL STRIP.AUTO: 0.2 E.U./DL
WBC # BLD AUTO: 6.52 THOUSAND/UL (ref 4.31–10.16)
WBC #/AREA URNS AUTO: ABNORMAL /HPF

## 2021-12-11 PROCEDURE — 81001 URINALYSIS AUTO W/SCOPE: CPT | Performed by: INTERNAL MEDICINE

## 2021-12-11 PROCEDURE — 80053 COMPREHEN METABOLIC PANEL: CPT

## 2021-12-11 PROCEDURE — 82306 VITAMIN D 25 HYDROXY: CPT

## 2021-12-11 PROCEDURE — 85730 THROMBOPLASTIN TIME PARTIAL: CPT

## 2021-12-11 PROCEDURE — 36415 COLL VENOUS BLD VENIPUNCTURE: CPT

## 2021-12-11 PROCEDURE — 85025 COMPLETE CBC W/AUTO DIFF WBC: CPT

## 2021-12-11 PROCEDURE — 83036 HEMOGLOBIN GLYCOSYLATED A1C: CPT

## 2021-12-11 PROCEDURE — 80061 LIPID PANEL: CPT

## 2021-12-11 PROCEDURE — 85610 PROTHROMBIN TIME: CPT

## 2022-02-14 DIAGNOSIS — D32.9 BENIGN NEOPLASM OF MENINGES, UNSPECIFIED (HCC): ICD-10-CM

## 2022-02-14 DIAGNOSIS — G47.00 INSOMNIA, UNSPECIFIED TYPE: ICD-10-CM

## 2022-02-14 DIAGNOSIS — I10 ESSENTIAL HYPERTENSION: ICD-10-CM

## 2022-02-14 DIAGNOSIS — E03.9 HYPOTHYROIDISM, UNSPECIFIED TYPE: ICD-10-CM

## 2022-02-14 DIAGNOSIS — E03.9 ACQUIRED HYPOTHYROIDISM: ICD-10-CM

## 2022-02-15 RX ORDER — ZOLPIDEM TARTRATE 5 MG/1
5 TABLET ORAL DAILY
Qty: 30 TABLET | Refills: 0 | Status: SHIPPED | OUTPATIENT
Start: 2022-02-15

## 2022-02-21 DIAGNOSIS — I10 ESSENTIAL HYPERTENSION: ICD-10-CM

## 2022-02-21 DIAGNOSIS — G47.00 INSOMNIA, UNSPECIFIED TYPE: ICD-10-CM

## 2022-02-21 DIAGNOSIS — E03.9 ACQUIRED HYPOTHYROIDISM: ICD-10-CM

## 2022-02-21 DIAGNOSIS — D32.9 BENIGN NEOPLASM OF MENINGES, UNSPECIFIED (HCC): ICD-10-CM

## 2022-02-21 DIAGNOSIS — E03.9 HYPOTHYROIDISM, UNSPECIFIED TYPE: ICD-10-CM

## 2022-02-21 RX ORDER — ZOLPIDEM TARTRATE 5 MG/1
5 TABLET ORAL DAILY
Qty: 30 TABLET | Refills: 0 | Status: CANCELLED | OUTPATIENT
Start: 2022-02-21

## 2022-04-25 ENCOUNTER — TELEPHONE (OUTPATIENT)
Dept: INTERNAL MEDICINE CLINIC | Facility: CLINIC | Age: 82
End: 2022-04-25

## 2022-04-25 DIAGNOSIS — K29.50 CHRONIC GASTRITIS, PRESENCE OF BLEEDING UNSPECIFIED, UNSPECIFIED GASTRITIS TYPE: Primary | ICD-10-CM

## 2022-04-25 DIAGNOSIS — Z13.220 LIPID SCREENING: ICD-10-CM

## 2022-04-25 DIAGNOSIS — E03.9 ACQUIRED HYPOTHYROIDISM: ICD-10-CM

## 2022-04-25 DIAGNOSIS — R73.03 PREDIABETES: ICD-10-CM

## 2022-04-25 DIAGNOSIS — I10 ESSENTIAL HYPERTENSION: ICD-10-CM

## 2022-04-26 ENCOUNTER — APPOINTMENT (OUTPATIENT)
Dept: LAB | Facility: CLINIC | Age: 82
End: 2022-04-26
Payer: MEDICARE

## 2022-04-26 DIAGNOSIS — Z13.220 LIPID SCREENING: ICD-10-CM

## 2022-04-26 DIAGNOSIS — R73.03 PREDIABETES: ICD-10-CM

## 2022-04-26 DIAGNOSIS — E03.9 ACQUIRED HYPOTHYROIDISM: ICD-10-CM

## 2022-04-26 DIAGNOSIS — I10 ESSENTIAL HYPERTENSION: ICD-10-CM

## 2022-04-26 LAB
ALBUMIN SERPL BCP-MCNC: 3.6 G/DL (ref 3.5–5)
ALP SERPL-CCNC: 55 U/L (ref 46–116)
ALT SERPL W P-5'-P-CCNC: 31 U/L (ref 12–78)
ANION GAP SERPL CALCULATED.3IONS-SCNC: 9 MMOL/L (ref 4–13)
AST SERPL W P-5'-P-CCNC: 21 U/L (ref 5–45)
BACTERIA UR QL AUTO: ABNORMAL /HPF
BASOPHILS # BLD AUTO: 0.09 THOUSANDS/ΜL (ref 0–0.1)
BASOPHILS NFR BLD AUTO: 2 % (ref 0–1)
BILIRUB SERPL-MCNC: 0.36 MG/DL (ref 0.2–1)
BILIRUB UR QL STRIP: NEGATIVE
BUN SERPL-MCNC: 14 MG/DL (ref 5–25)
CALCIUM SERPL-MCNC: 8.9 MG/DL (ref 8.3–10.1)
CHLORIDE SERPL-SCNC: 106 MMOL/L (ref 100–108)
CHOLEST SERPL-MCNC: 206 MG/DL
CLARITY UR: CLEAR
CO2 SERPL-SCNC: 24 MMOL/L (ref 21–32)
COLOR UR: ABNORMAL
CREAT SERPL-MCNC: 0.74 MG/DL (ref 0.6–1.3)
EOSINOPHIL # BLD AUTO: 0.15 THOUSAND/ΜL (ref 0–0.61)
EOSINOPHIL NFR BLD AUTO: 3 % (ref 0–6)
ERYTHROCYTE [DISTWIDTH] IN BLOOD BY AUTOMATED COUNT: 14.6 % (ref 11.6–15.1)
GFR SERPL CREATININE-BSD FRML MDRD: 76 ML/MIN/1.73SQ M
GLUCOSE P FAST SERPL-MCNC: 88 MG/DL (ref 65–99)
GLUCOSE UR STRIP-MCNC: NEGATIVE MG/DL
HCT VFR BLD AUTO: 38.6 % (ref 34.8–46.1)
HDLC SERPL-MCNC: 58 MG/DL
HGB BLD-MCNC: 12.3 G/DL (ref 11.5–15.4)
HGB UR QL STRIP.AUTO: NEGATIVE
IMM GRANULOCYTES # BLD AUTO: 0.01 THOUSAND/UL (ref 0–0.2)
IMM GRANULOCYTES NFR BLD AUTO: 0 % (ref 0–2)
KETONES UR STRIP-MCNC: NEGATIVE MG/DL
LDLC SERPL CALC-MCNC: 123 MG/DL (ref 0–100)
LEUKOCYTE ESTERASE UR QL STRIP: ABNORMAL
LYMPHOCYTES # BLD AUTO: 2.74 THOUSANDS/ΜL (ref 0.6–4.47)
LYMPHOCYTES NFR BLD AUTO: 47 % (ref 14–44)
MCH RBC QN AUTO: 28.6 PG (ref 26.8–34.3)
MCHC RBC AUTO-ENTMCNC: 31.9 G/DL (ref 31.4–37.4)
MCV RBC AUTO: 90 FL (ref 82–98)
MONOCYTES # BLD AUTO: 0.5 THOUSAND/ΜL (ref 0.17–1.22)
MONOCYTES NFR BLD AUTO: 9 % (ref 4–12)
NEUTROPHILS # BLD AUTO: 2.24 THOUSANDS/ΜL (ref 1.85–7.62)
NEUTS SEG NFR BLD AUTO: 39 % (ref 43–75)
NITRITE UR QL STRIP: NEGATIVE
NON-SQ EPI CELLS URNS QL MICRO: ABNORMAL /HPF
NONHDLC SERPL-MCNC: 148 MG/DL
NRBC BLD AUTO-RTO: 0 /100 WBCS
PH UR STRIP.AUTO: 7.5 [PH]
PLATELET # BLD AUTO: 267 THOUSANDS/UL (ref 149–390)
PMV BLD AUTO: 9.7 FL (ref 8.9–12.7)
POTASSIUM SERPL-SCNC: 4 MMOL/L (ref 3.5–5.3)
PROT SERPL-MCNC: 7 G/DL (ref 6.4–8.2)
PROT UR STRIP-MCNC: NEGATIVE MG/DL
RBC # BLD AUTO: 4.3 MILLION/UL (ref 3.81–5.12)
RBC #/AREA URNS AUTO: ABNORMAL /HPF
SODIUM SERPL-SCNC: 139 MMOL/L (ref 136–145)
SP GR UR STRIP.AUTO: 1.01 (ref 1–1.03)
TRIGL SERPL-MCNC: 126 MG/DL
TSH SERPL DL<=0.05 MIU/L-ACNC: 2.44 UIU/ML (ref 0.45–4.5)
URINE COMMENT: ABNORMAL
UROBILINOGEN UR QL STRIP.AUTO: 0.2 E.U./DL
WBC # BLD AUTO: 5.73 THOUSAND/UL (ref 4.31–10.16)
WBC #/AREA URNS AUTO: ABNORMAL /HPF

## 2022-04-26 PROCEDURE — 80053 COMPREHEN METABOLIC PANEL: CPT

## 2022-04-26 PROCEDURE — 84443 ASSAY THYROID STIM HORMONE: CPT

## 2022-04-26 PROCEDURE — 80061 LIPID PANEL: CPT

## 2022-04-26 PROCEDURE — 36415 COLL VENOUS BLD VENIPUNCTURE: CPT

## 2022-04-26 PROCEDURE — 85025 COMPLETE CBC W/AUTO DIFF WBC: CPT

## 2022-04-26 PROCEDURE — 81001 URINALYSIS AUTO W/SCOPE: CPT | Performed by: INTERNAL MEDICINE

## 2022-04-29 ENCOUNTER — TELEPHONE (OUTPATIENT)
Dept: INTERNAL MEDICINE CLINIC | Facility: CLINIC | Age: 82
End: 2022-04-29

## 2022-04-29 ENCOUNTER — OFFICE VISIT (OUTPATIENT)
Dept: INTERNAL MEDICINE CLINIC | Facility: CLINIC | Age: 82
End: 2022-04-29
Payer: MEDICARE

## 2022-04-29 VITALS
TEMPERATURE: 97.8 F | SYSTOLIC BLOOD PRESSURE: 130 MMHG | OXYGEN SATURATION: 96 % | HEART RATE: 66 BPM | HEIGHT: 62 IN | WEIGHT: 137 LBS | BODY MASS INDEX: 25.21 KG/M2 | DIASTOLIC BLOOD PRESSURE: 60 MMHG

## 2022-04-29 DIAGNOSIS — I10 ESSENTIAL HYPERTENSION: ICD-10-CM

## 2022-04-29 DIAGNOSIS — I10 ESSENTIAL HYPERTENSION: Primary | ICD-10-CM

## 2022-04-29 DIAGNOSIS — Z00.00 MEDICARE ANNUAL WELLNESS VISIT, SUBSEQUENT: ICD-10-CM

## 2022-04-29 DIAGNOSIS — E03.9 ACQUIRED HYPOTHYROIDISM: Primary | ICD-10-CM

## 2022-04-29 DIAGNOSIS — R73.03 PREDIABETES: ICD-10-CM

## 2022-04-29 PROCEDURE — 99214 OFFICE O/P EST MOD 30 MIN: CPT | Performed by: INTERNAL MEDICINE

## 2022-04-29 PROCEDURE — G0439 PPPS, SUBSEQ VISIT: HCPCS | Performed by: INTERNAL MEDICINE

## 2022-04-29 RX ORDER — NAPROXEN 375 MG/1
TABLET ORAL
COMMUNITY
Start: 2022-04-18

## 2022-04-29 RX ORDER — PREDNISONE 10 MG/1
TABLET ORAL
COMMUNITY
Start: 2022-02-27

## 2022-04-29 RX ORDER — TRIAMCINOLONE ACETONIDE 1 MG/G
CREAM TOPICAL 2 TIMES DAILY PRN
COMMUNITY
Start: 2022-01-31 | End: 2023-01-31

## 2022-04-29 NOTE — PROGRESS NOTES
Assessment/Plan:           1  Acquired hypothyroidism  Comments:  Continue levothyroxine 50 mcg is  2  Essential hypertension  Comments:  Continue amlodipine  3  Prediabetes  Comments:  Blood work was reviewed  Low carb diet and avoidance of prednisone discussed  4  Medicare annual wellness visit, subsequent  Comments:  DEXA scan is pending  1  Acquired hypothyroidism      2  Essential hypertension      3  Prediabetes      4  Hypothyroidism, unspecified type      5  Medicare annual wellness visit, subsequent         No problem-specific Assessment & Plan notes found for this encounter  Subjective:      Patient ID: Alexsander Wren is a [de-identified] y o  female  HPI    The following portions of the patient's history were reviewed and updated as appropriate: She  has a past medical history of Disease of thyroid gland, GERD (gastroesophageal reflux disease), and Shoulder impingement  She   Patient Active Problem List    Diagnosis Date Noted    Prediabetes 07/22/2021    Cold hands 01/20/2021    Acquired hypothyroidism 01/20/2021    Other insomnia 01/20/2021    Rosacea 01/20/2021    Chronic gastritis 11/19/2020    Diverticular disease of colon 11/19/2020    Hemorrhoids 11/19/2020    Meningioma, multiple ([facility] ) 12/12/2017    Essential hypertension 17/84/2108    Helicobacter pylori gastritis 10/19/2017    Sinus bradycardia 12/01/2014     She  has a past surgical history that includes Femur Surgery (Right); Appendectomy (1993); Oophorectomy; and Hysterectomy (1993)  Her family history includes Breast cancer in her family; Breast cancer (age of onset: 44) in her sister; Colon cancer in her family; Colon cancer (age of onset: 80) in her mother; Hypertension in her family and father; Pancreatic cancer in her sister; Stroke in her family and father  She  reports that she has never smoked   She has never used smokeless tobacco  She reports that she does not drink alcohol and does not use drugs   Current Outpatient Medications   Medication Sig Dispense Refill    amLODIPine (NORVASC) 5 mg tablet TAKE 1 TABLET BY MOUTH  TWICE DAILY 180 tablet 3    B Complex Vitamins (VITAMIN-B COMPLEX PO) Take 1 tablet by mouth      Betaine HCl 300 MG TABS Take 2 tablets (600 mg total) by mouth daily as needed (indigestion) 30 tablet 0    metroNIDAZOLE (METROGEL) 0 75 % gel Apply topically 2 (two) times a day 135 g 1    naproxen (NAPROSYN) 375 mg tablet As needed for pain       pancrelipase, Lip-Prot-Amyl, (CREON) 24,000 units Take 24,000 units of lipase by mouth daily as needed for snacks 30 capsule 0    predniSONE 10 mg tablet       Synthroid 50 MCG tablet Take one tablet daily on empty stomach except on Saturday and Sunday take 2 tablet 120 tablet 1    triamcinolone (KENALOG) 0 1 % cream Apply topically 2 (two) times a day as needed      zolpidem (AMBIEN) 5 mg tablet Take 1 tablet (5 mg total) by mouth daily 30 tablet 0    phenazopyridine (PYRIDIUM) 200 mg tablet Take 1 tablet (200 mg total) by mouth 3 (three) times a day as needed for bladder spasms (Patient not taking: Reported on 4/29/2022 ) 30 tablet 0     No current facility-administered medications for this visit       Current Outpatient Medications on File Prior to Visit   Medication Sig    amLODIPine (NORVASC) 5 mg tablet TAKE 1 TABLET BY MOUTH  TWICE DAILY    B Complex Vitamins (VITAMIN-B COMPLEX PO) Take 1 tablet by mouth    Betaine HCl 300 MG TABS Take 2 tablets (600 mg total) by mouth daily as needed (indigestion)    metroNIDAZOLE (METROGEL) 0 75 % gel Apply topically 2 (two) times a day    naproxen (NAPROSYN) 375 mg tablet As needed for pain     pancrelipase, Lip-Prot-Amyl, (CREON) 24,000 units Take 24,000 units of lipase by mouth daily as needed for snacks    predniSONE 10 mg tablet     Synthroid 50 MCG tablet Take one tablet daily on empty stomach except on Saturday and Sunday take 2 tablet    triamcinolone (KENALOG) 0 1 % cream Apply topically 2 (two) times a day as needed    zolpidem (AMBIEN) 5 mg tablet Take 1 tablet (5 mg total) by mouth daily    phenazopyridine (PYRIDIUM) 200 mg tablet Take 1 tablet (200 mg total) by mouth 3 (three) times a day as needed for bladder spasms (Patient not taking: Reported on 4/29/2022 )     No current facility-administered medications on file prior to visit  She is allergic to no active allergies       Review of Systems   Constitutional: Negative for appetite change, chills, fatigue and fever  HENT: Negative for sore throat and trouble swallowing  Eyes: Negative for redness  Respiratory: Negative for shortness of breath  Cardiovascular: Negative for chest pain and palpitations  Gastrointestinal: Negative for abdominal pain, constipation and diarrhea  Genitourinary: Negative for dysuria and hematuria  Musculoskeletal: Negative for back pain and neck pain  Skin: Negative for rash  Neurological: Negative for seizures, weakness and headaches  Hematological: Negative for adenopathy  Psychiatric/Behavioral: Negative for confusion  The patient is not nervous/anxious            Objective:      /60 (BP Location: Left arm, Patient Position: Sitting, Cuff Size: Adult)   Pulse 66   Temp 97 8 °F (36 6 °C) (Temporal)   Ht 5' 2" (1 575 m)   Wt 62 1 kg (137 lb)   SpO2 96%   BMI 25 06 kg/m²     Results Reviewed     None          Recent Results (from the past 1344 hour(s))   CBC and differential    Collection Time: 04/26/22  6:56 AM   Result Value Ref Range    WBC 5 73 4 31 - 10 16 Thousand/uL    RBC 4 30 3 81 - 5 12 Million/uL    Hemoglobin 12 3 11 5 - 15 4 g/dL    Hematocrit 38 6 34 8 - 46 1 %    MCV 90 82 - 98 fL    MCH 28 6 26 8 - 34 3 pg    MCHC 31 9 31 4 - 37 4 g/dL    RDW 14 6 11 6 - 15 1 %    MPV 9 7 8 9 - 12 7 fL    Platelets 152 392 - 104 Thousands/uL    nRBC 0 /100 WBCs    Neutrophils Relative 39 (L) 43 - 75 %    Immat GRANS % 0 0 - 2 %    Lymphocytes Relative 47 (H) 14 - 44 %    Monocytes Relative 9 4 - 12 %    Eosinophils Relative 3 0 - 6 %    Basophils Relative 2 (H) 0 - 1 %    Neutrophils Absolute 2 24 1 85 - 7 62 Thousands/µL    Immature Grans Absolute 0 01 0 00 - 0 20 Thousand/uL    Lymphocytes Absolute 2 74 0 60 - 4 47 Thousands/µL    Monocytes Absolute 0 50 0 17 - 1 22 Thousand/µL    Eosinophils Absolute 0 15 0 00 - 0 61 Thousand/µL    Basophils Absolute 0 09 0 00 - 0 10 Thousands/µL   Comprehensive metabolic panel    Collection Time: 04/26/22  6:56 AM   Result Value Ref Range    Sodium 139 136 - 145 mmol/L    Potassium 4 0 3 5 - 5 3 mmol/L    Chloride 106 100 - 108 mmol/L    CO2 24 21 - 32 mmol/L    ANION GAP 9 4 - 13 mmol/L    BUN 14 5 - 25 mg/dL    Creatinine 0 74 0 60 - 1 30 mg/dL    Glucose, Fasting 88 65 - 99 mg/dL    Calcium 8 9 8 3 - 10 1 mg/dL    AST 21 5 - 45 U/L    ALT 31 12 - 78 U/L    Alkaline Phosphatase 55 46 - 116 U/L    Total Protein 7 0 6 4 - 8 2 g/dL    Albumin 3 6 3 5 - 5 0 g/dL    Total Bilirubin 0 36 0 20 - 1 00 mg/dL    eGFR 76 ml/min/1 73sq m   Lipid panel    Collection Time: 04/26/22  6:56 AM   Result Value Ref Range    Cholesterol 206 (H) See Comment mg/dL    Triglycerides 126 See Comment mg/dL    HDL, Direct 58 >=50 mg/dL    LDL Calculated 123 (H) 0 - 100 mg/dL    Non-HDL-Chol (CHOL-HDL) 148 mg/dl   TSH, 3rd generation with Free T4 reflex    Collection Time: 04/26/22  6:56 AM   Result Value Ref Range    TSH 3RD GENERATON 2 441 0 450 - 4 500 uIU/mL   UA w Reflex to Microscopic w Reflex to Culture    Collection Time: 04/26/22  7:45 AM    Specimen: Urine, Clean Catch   Result Value Ref Range    Color, UA Light Yellow     Clarity, UA Clear     Specific [place], UA 1 015 1 003 - 1 030    pH, UA 7 5 4 5, 5 0, 5 5, 6 0, 6 5, 7 0, 7 5, 8 0    Leukocytes, UA Large (A) Negative    Nitrite, UA Negative Negative    Protein, UA Negative Negative mg/dl    Glucose, UA Negative Negative mg/dl    Ketones, UA Negative Negative mg/dl    Urobilinogen, UA 0 2 0 2, 1 0 E U /dl E U /dl    Bilirubin, UA Negative Negative    Blood, UA Negative Negative   Urine Microscopic    Collection Time: 04/26/22  7:45 AM   Result Value Ref Range    RBC, UA 0-1 None Seen, 0-1, 1-2, 2-4, 0-5 /hpf    WBC, UA 4-10 (A) None Seen, 0-1, 1-2, 0-5, 2-4 /hpf    Epithelial Cells Occasional None Seen, Occasional /hpf    Bacteria, UA Moderate (A) None Seen, Occasional /hpf    URINE COMMENT Concentrated microscopic on low volume urine         Physical Exam  Constitutional:       General: She is not in acute distress  Appearance: Normal appearance  HENT:      Head: Normocephalic and atraumatic  Nose: Nose normal       Mouth/Throat:      Mouth: Mucous membranes are moist    Eyes:      Extraocular Movements: Extraocular movements intact  Pupils: Pupils are equal, round, and reactive to light  Cardiovascular:      Rate and Rhythm: Normal rate and regular rhythm  Pulses: Normal pulses  Heart sounds: Normal heart sounds  No murmur heard  No friction rub  Pulmonary:      Effort: Pulmonary effort is normal  No respiratory distress  Breath sounds: Normal breath sounds  No wheezing  Abdominal:      General: Abdomen is flat  Bowel sounds are normal  There is no distension  Palpations: Abdomen is soft  There is no mass  Tenderness: There is no abdominal tenderness  There is no guarding  Musculoskeletal:         General: Normal range of motion  Neurological:      General: No focal deficit present  Mental Status: She is alert and oriented to person, place, and time  Mental status is at baseline  Cranial Nerves: No cranial nerve deficit  Psychiatric:         Mood and Affect: Mood normal          Behavior: Behavior normal        BMI Counseling: Body mass index is 25 06 kg/m²  The BMI is above normal  Nutrition recommendations include reducing portion sizes

## 2022-04-29 NOTE — PROGRESS NOTES
Assessment and Plan:     Problem List Items Addressed This Visit        Endocrine    Acquired hypothyroidism - Primary    Relevant Medications    predniSONE 10 mg tablet       Cardiovascular and Mediastinum    Essential hypertension       Other    Prediabetes      Other Visit Diagnoses     Hypothyroidism, unspecified type        Relevant Medications    predniSONE 10 mg tablet    Medicare annual wellness visit, subsequent               Preventive health issues were discussed with patient, and age appropriate screening tests were ordered as noted in patient's After Visit Summary  Personalized health advice and appropriate referrals for health education or preventive services given if needed, as noted in patient's After Visit Summary       History of Present Illness:     Patient presents for Medicare Annual Wellness visit    Patient Care Team:  Breanna Fofana MD as PCP - General (Internal Medicine)     Problem List:     Patient Active Problem List   Diagnosis    Meningioma, multiple ([address] )    Essential hypertension    Chronic gastritis    Diverticular disease of colon    Helicobacter pylori gastritis    Hemorrhoids    Cold hands    Acquired hypothyroidism    Other insomnia    Rosacea    Prediabetes    Sinus bradycardia      Past Medical and Surgical History:     Past Medical History:   Diagnosis Date    Disease of thyroid gland     GERD (gastroesophageal reflux disease)     Shoulder impingement      Past Surgical History:   Procedure Laterality Date    APPENDECTOMY  1993    FEMUR SURGERY Right     HYSTERECTOMY  1993    TAHBSO    OOPHORECTOMY        Family History:     Family History   Problem Relation Age of Onset   [facility] Breast [facility] Family     Colon cancer Family     Stroke Family     Hypertension Family     Colon cancer Mother 80    Breast cancer Sister 44    Pancreatic cancer Sister     Hypertension Father     Stroke Father     Alcohol abuse Neg Hx     Substance Abuse Neg Hx     Mental illness Neg Hx     Depression Neg Hx       Social History:     Social History     Socioeconomic History    Marital status: /Civil Union     Spouse name: None    Number of children: None    Years of education: None    Highest education level: None   Occupational History    Occupation: retired   Tobacco Use    Smoking status: Never Smoker    Smokeless tobacco: Never Used    Tobacco comment: no passive smoke exposure   Vaping Use    Vaping Use: Never used   Substance and Sexual Activity    Alcohol use: No    Drug use: No    Sexual activity: Not Currently     Partners: Male     Birth control/protection: None     Comment:    Other Topics Concern    None   Social History Narrative        2 children     Social Determinants of Health     Financial Resource Strain: Not on file   Food Insecurity: Not on file   Transportation Needs: Not on file   Physical Activity: Not on file   Stress: Not on file   Social Connections: Not on file   Intimate Partner Violence: Not on file   Housing Stability: Not on file      Medications and Allergies:     Current Outpatient Medications   Medication Sig Dispense Refill    amLODIPine (NORVASC) 5 mg tablet TAKE 1 TABLET BY MOUTH  TWICE DAILY 180 tablet 3    B Complex Vitamins (VITAMIN-B COMPLEX PO) Take 1 tablet by mouth      Betaine HCl 300 MG TABS Take 2 tablets (600 mg total) by mouth daily as needed (indigestion) 30 tablet 0    metroNIDAZOLE (METROGEL) 0 75 % gel Apply topically 2 (two) times a day 135 g 1    naproxen (NAPROSYN) 375 mg tablet As needed for pain       pancrelipase, Lip-Prot-Amyl, (CREON) 24,000 units Take 24,000 units of lipase by mouth daily as needed for snacks 30 capsule 0    predniSONE 10 mg tablet       Synthroid 50 MCG tablet Take one tablet daily on empty stomach except on Saturday and Sunday take 2 tablet 120 tablet 1    triamcinolone (KENALOG) 0 1 % cream Apply topically 2 (two) times a day as needed      zolpidem (AMBIEN) 5 mg tablet Take 1 tablet (5 mg total) by mouth daily 30 tablet 0    phenazopyridine (PYRIDIUM) 200 mg tablet Take 1 tablet (200 mg total) by mouth 3 (three) times a day as needed for bladder spasms (Patient not taking: Reported on 4/29/2022 ) 30 tablet 0     No current facility-administered medications for this visit  Allergies   Allergen Reactions    No Active Allergies       Immunizations:     Immunization History   Administered Date(s) Administered    COVID-19 MODERNA VACC 0 5 ML IM 01/25/2021, 02/22/2021    Zoster 11/06/2014      Health Maintenance: There are no preventive care reminders to display for this patient  Topic Date Due    DTaP,Tdap,and Td Vaccines (1 - Tdap) Never done    Pneumococcal Vaccine: 65+ Years (1 of 1 - PPSV23) Never done    COVID-19 Vaccine (3 - Booster for Moderna series) 07/22/2021    Influenza Vaccine (1) Never done      Medicare Health Risk Assessment:     /60 (BP Location: Left arm, Patient Position: Sitting, Cuff Size: Adult)   Pulse 66   Temp 97 8 °F (36 6 °C) (Temporal)   Ht 5' 2" (1 575 m)   Wt 62 1 kg (137 lb)   SpO2 96%   BMI 25 06 kg/m²      Ramona is here for her Subsequent Wellness visit  Last Medicare Wellness visit information reviewed, patient interviewed and updates made to the record today  Health Risk Assessment:   Patient rates overall health as good  Patient feels that their physical health rating is same  Patient is satisfied with their life  Eyesight was rated as slightly worse  Hearing was rated as slightly worse  Patient feels that their emotional and mental health rating is slightly better  Patients states they are never, rarely angry  Patient states they are never, rarely unusually tired/fatigued  Pain experienced in the last 7 days has been some  Patient's pain rating has been 2/10  Patient states that she has experienced no weight loss or gain in last 6 months       Depression Screening:   PHQ-2 Score: 0      Fall Risk Screening: In the past year, patient has experienced: no history of falling in past year      Urinary Incontinence Screening:   Patient has not leaked urine accidently in the last six months  Home Safety:  Patient does not have trouble with stairs inside or outside of their home  Patient has working smoke alarms and has working carbon monoxide detector  Home safety hazards include: none  Nutrition:   Current diet is Regular  Medications:   Patient is currently taking over-the-counter supplements  OTC medications include: see medication list  Patient is able to manage medications  Activities of Daily Living (ADLs)/Instrumental Activities of Daily Living (IADLs):   Walk and transfer into and out of bed and chair?: Yes  Dress and groom yourself?: Yes    Bathe or shower yourself?: Yes    Feed yourself? Yes  Do your laundry/housekeeping?: Yes  Manage your money, pay your bills and track your expenses?: Yes  Make your own meals?: Yes    Do your own shopping?: Yes    Previous Hospitalizations:   Any hospitalizations or ED visits within the last 12 months?: No      Advance Care Planning:   Living will: Yes    Advanced directive: Yes      PREVENTIVE SCREENINGS      Cardiovascular Screening:    General: Screening Current      Diabetes Screening:     General: Screening Current      Cervical Cancer Screening:    General: Screening Not Indicated      Osteoporosis Screening:    General: Screening Not Indicated and History Osteoporosis      Lung Cancer Screening:     General: Screening Not Indicated    Screening, Brief Intervention, and Referral to Treatment (SBIRT)    Screening  Typical number of drinks in a day: 0  Typical number of drinks in a week: 0  Interpretation: Low risk drinking behavior        Lorenza Sinclair MD

## 2022-05-16 DIAGNOSIS — E03.9 HYPOTHYROIDISM, UNSPECIFIED TYPE: ICD-10-CM

## 2022-05-16 DIAGNOSIS — G47.00 INSOMNIA, UNSPECIFIED TYPE: ICD-10-CM

## 2022-05-16 DIAGNOSIS — D32.9 BENIGN NEOPLASM OF MENINGES, UNSPECIFIED (HCC): ICD-10-CM

## 2022-05-16 DIAGNOSIS — E03.9 ACQUIRED HYPOTHYROIDISM: ICD-10-CM

## 2022-05-16 DIAGNOSIS — I10 ESSENTIAL HYPERTENSION: ICD-10-CM

## 2022-05-16 RX ORDER — METRONIDAZOLE 7.5 MG/G
GEL TOPICAL
Qty: 135 G | Refills: 1 | Status: SHIPPED | OUTPATIENT
Start: 2022-05-16

## 2022-07-18 ENCOUNTER — HOSPITAL ENCOUNTER (OUTPATIENT)
Dept: BONE DENSITY | Facility: CLINIC | Age: 82
Discharge: HOME | End: 2022-07-18
Payer: MEDICARE

## 2022-07-18 ENCOUNTER — HOSPITAL ENCOUNTER (OUTPATIENT)
Dept: MAMMOGRAPHY | Facility: CLINIC | Age: 82
Discharge: HOME | End: 2022-07-18
Payer: MEDICARE

## 2022-07-18 VITALS — BODY MASS INDEX: 25.21 KG/M2 | WEIGHT: 137 LBS | HEIGHT: 62 IN

## 2022-07-18 DIAGNOSIS — Z12.31 ENCOUNTER FOR SCREENING MAMMOGRAM FOR MALIGNANT NEOPLASM OF BREAST: ICD-10-CM

## 2022-07-18 DIAGNOSIS — Z78.0 ENCOUNTER FOR OSTEOPOROSIS SCREENING IN ASYMPTOMATIC POSTMENOPAUSAL PATIENT: ICD-10-CM

## 2022-07-18 DIAGNOSIS — Z13.820 ENCOUNTER FOR OSTEOPOROSIS SCREENING IN ASYMPTOMATIC POSTMENOPAUSAL PATIENT: ICD-10-CM

## 2022-07-18 PROCEDURE — 77067 SCR MAMMO BI INCL CAD: CPT

## 2022-07-18 PROCEDURE — 77063 BREAST TOMOSYNTHESIS BI: CPT

## 2022-07-18 PROCEDURE — 77080 DXA BONE DENSITY AXIAL: CPT

## 2022-09-28 ENCOUNTER — TELEPHONE (OUTPATIENT)
Dept: INTERNAL MEDICINE CLINIC | Facility: CLINIC | Age: 82
End: 2022-09-28

## 2022-09-28 DIAGNOSIS — E03.9 ACQUIRED HYPOTHYROIDISM: Primary | ICD-10-CM

## 2022-09-30 ENCOUNTER — APPOINTMENT (OUTPATIENT)
Dept: LAB | Facility: CLINIC | Age: 82
End: 2022-09-30
Payer: MEDICARE

## 2022-09-30 DIAGNOSIS — I10 ESSENTIAL HYPERTENSION: ICD-10-CM

## 2022-09-30 DIAGNOSIS — E03.9 ACQUIRED HYPOTHYROIDISM: ICD-10-CM

## 2022-09-30 LAB
ALBUMIN SERPL BCP-MCNC: 3.9 G/DL (ref 3.5–5)
ALP SERPL-CCNC: 54 U/L (ref 34–104)
ALT SERPL W P-5'-P-CCNC: 16 U/L (ref 7–52)
ANION GAP SERPL CALCULATED.3IONS-SCNC: 6 MMOL/L (ref 4–13)
AST SERPL W P-5'-P-CCNC: 16 U/L (ref 13–39)
BASOPHILS # BLD AUTO: 0.09 THOUSANDS/ΜL (ref 0–0.1)
BASOPHILS NFR BLD AUTO: 1 % (ref 0–1)
BILIRUB SERPL-MCNC: 0.37 MG/DL (ref 0.2–1)
BUN SERPL-MCNC: 16 MG/DL (ref 5–25)
CALCIUM SERPL-MCNC: 8.9 MG/DL (ref 8.4–10.2)
CHLORIDE SERPL-SCNC: 106 MMOL/L (ref 96–108)
CO2 SERPL-SCNC: 27 MMOL/L (ref 21–32)
CREAT SERPL-MCNC: 0.65 MG/DL (ref 0.6–1.3)
EOSINOPHIL # BLD AUTO: 0.15 THOUSAND/ΜL (ref 0–0.61)
EOSINOPHIL NFR BLD AUTO: 2 % (ref 0–6)
ERYTHROCYTE [DISTWIDTH] IN BLOOD BY AUTOMATED COUNT: 13.8 % (ref 11.6–15.1)
GFR SERPL CREATININE-BSD FRML MDRD: 84 ML/MIN/1.73SQ M
GLUCOSE P FAST SERPL-MCNC: 92 MG/DL (ref 65–99)
HCT VFR BLD AUTO: 40.6 % (ref 34.8–46.1)
HGB BLD-MCNC: 13.1 G/DL (ref 11.5–15.4)
IMM GRANULOCYTES # BLD AUTO: 0.02 THOUSAND/UL (ref 0–0.2)
IMM GRANULOCYTES NFR BLD AUTO: 0 % (ref 0–2)
LYMPHOCYTES # BLD AUTO: 2.76 THOUSANDS/ΜL (ref 0.6–4.47)
LYMPHOCYTES NFR BLD AUTO: 43 % (ref 14–44)
MCH RBC QN AUTO: 29 PG (ref 26.8–34.3)
MCHC RBC AUTO-ENTMCNC: 32.3 G/DL (ref 31.4–37.4)
MCV RBC AUTO: 90 FL (ref 82–98)
MONOCYTES # BLD AUTO: 0.48 THOUSAND/ΜL (ref 0.17–1.22)
MONOCYTES NFR BLD AUTO: 7 % (ref 4–12)
NEUTROPHILS # BLD AUTO: 2.99 THOUSANDS/ΜL (ref 1.85–7.62)
NEUTS SEG NFR BLD AUTO: 47 % (ref 43–75)
NRBC BLD AUTO-RTO: 0 /100 WBCS
PLATELET # BLD AUTO: 278 THOUSANDS/UL (ref 149–390)
PMV BLD AUTO: 9.3 FL (ref 8.9–12.7)
POTASSIUM SERPL-SCNC: 3.6 MMOL/L (ref 3.5–5.3)
PROT SERPL-MCNC: 6.9 G/DL (ref 6.4–8.4)
RBC # BLD AUTO: 4.51 MILLION/UL (ref 3.81–5.12)
SODIUM SERPL-SCNC: 139 MMOL/L (ref 135–147)
T4 FREE SERPL-MCNC: 1.32 NG/DL (ref 0.76–1.46)
TSH SERPL DL<=0.05 MIU/L-ACNC: 0.65 UIU/ML (ref 0.45–4.5)
WBC # BLD AUTO: 6.49 THOUSAND/UL (ref 4.31–10.16)

## 2022-09-30 PROCEDURE — 84443 ASSAY THYROID STIM HORMONE: CPT

## 2022-09-30 PROCEDURE — 84439 ASSAY OF FREE THYROXINE: CPT

## 2022-09-30 PROCEDURE — 80053 COMPREHEN METABOLIC PANEL: CPT

## 2022-09-30 PROCEDURE — 36415 COLL VENOUS BLD VENIPUNCTURE: CPT

## 2022-09-30 PROCEDURE — 85025 COMPLETE CBC W/AUTO DIFF WBC: CPT

## 2022-10-28 ENCOUNTER — OFFICE VISIT (OUTPATIENT)
Dept: INTERNAL MEDICINE CLINIC | Facility: CLINIC | Age: 82
End: 2022-10-28

## 2022-10-28 VITALS
DIASTOLIC BLOOD PRESSURE: 70 MMHG | SYSTOLIC BLOOD PRESSURE: 120 MMHG | OXYGEN SATURATION: 99 % | WEIGHT: 137 LBS | HEART RATE: 65 BPM | HEIGHT: 62 IN | TEMPERATURE: 97.5 F | BODY MASS INDEX: 25.21 KG/M2

## 2022-10-28 DIAGNOSIS — D32.9 BENIGN NEOPLASM OF MENINGES, UNSPECIFIED (HCC): ICD-10-CM

## 2022-10-28 DIAGNOSIS — E03.9 HYPOTHYROIDISM, UNSPECIFIED TYPE: ICD-10-CM

## 2022-10-28 DIAGNOSIS — I10 ESSENTIAL HYPERTENSION: Primary | ICD-10-CM

## 2022-10-28 DIAGNOSIS — G47.00 INSOMNIA, UNSPECIFIED TYPE: ICD-10-CM

## 2022-10-28 DIAGNOSIS — E03.9 ACQUIRED HYPOTHYROIDISM: ICD-10-CM

## 2022-10-28 DIAGNOSIS — Z23 NEED FOR SHINGLES VACCINE: ICD-10-CM

## 2022-10-28 RX ORDER — ZOSTER VACCINE RECOMBINANT, ADJUVANTED 50 MCG/0.5
0.5 KIT INTRAMUSCULAR ONCE
Qty: 1 EACH | Refills: 1 | Status: SHIPPED | OUTPATIENT
Start: 2022-10-28 | End: 2022-10-28

## 2022-10-28 RX ORDER — ZOLPIDEM TARTRATE 5 MG/1
5 TABLET ORAL DAILY
Qty: 30 TABLET | Refills: 1 | Status: SHIPPED | OUTPATIENT
Start: 2022-10-28

## 2022-10-28 RX ORDER — AMLODIPINE BESYLATE 5 MG/1
5 TABLET ORAL 2 TIMES DAILY
Qty: 180 TABLET | Refills: 1 | Status: SHIPPED | OUTPATIENT
Start: 2022-10-28

## 2022-10-28 RX ORDER — LEVOTHYROXINE SODIUM 50 MCG
TABLET ORAL
Qty: 117 TABLET | Refills: 1 | Status: CANCELLED | OUTPATIENT
Start: 2022-10-28

## 2022-10-28 RX ORDER — LEVOTHYROXINE SODIUM 50 MCG
TABLET ORAL
Qty: 117 TABLET | Refills: 1 | Status: SHIPPED | OUTPATIENT
Start: 2022-10-28

## 2022-10-28 NOTE — PROGRESS NOTES
Assessment/Plan:           1  Essential hypertension  -     amLODIPine (NORVASC) 5 mg tablet; Take 1 tablet (5 mg total) by mouth 2 (two) times a day  -     zolpidem (AMBIEN) 5 mg tablet; Take 1 tablet (5 mg total) by mouth daily  -     Synthroid 50 MCG tablet; TAKE 1 TABLET BY MOUTH  DAILY ON AN EMPTY STOMACH  EXCEPT ON SATURDAY AND  SUNDAY TAKE 2 TABLETS    2  Hypothyroidism, unspecified type  -     zolpidem (AMBIEN) 5 mg tablet; Take 1 tablet (5 mg total) by mouth daily  -     Synthroid 50 MCG tablet; TAKE 1 TABLET BY MOUTH  DAILY ON AN EMPTY STOMACH  EXCEPT ON SATURDAY AND  SUNDAY TAKE 2 TABLETS    3  Acquired hypothyroidism  -     zolpidem (AMBIEN) 5 mg tablet; Take 1 tablet (5 mg total) by mouth daily  -     Synthroid 50 MCG tablet; TAKE 1 TABLET BY MOUTH  DAILY ON AN EMPTY STOMACH  EXCEPT ON SATURDAY AND  SUNDAY TAKE 2 TABLETS    4  Insomnia, unspecified type  -     zolpidem (AMBIEN) 5 mg tablet; Take 1 tablet (5 mg total) by mouth daily  -     Synthroid 50 MCG tablet; TAKE 1 TABLET BY MOUTH  DAILY ON AN EMPTY STOMACH  EXCEPT ON SATURDAY AND  SUNDAY TAKE 2 TABLETS    5  Benign neoplasm of meninges, unspecified (HCC)  -     zolpidem (AMBIEN) 5 mg tablet; Take 1 tablet (5 mg total) by mouth daily    6  Need for shingles vaccine  -     Zoster Vac Recomb Adjuvanted (Shingrix) 50 MCG/0 5ML SUSR; Inject 0 5 mL into a muscle once for 1 dose Repeat dose in 2 to 6 months         1  Essential hypertension    - amLODIPine (NORVASC) 5 mg tablet; Take 1 tablet (5 mg total) by mouth 2 (two) times a day  Dispense: 180 tablet; Refill: 1  - zolpidem (AMBIEN) 5 mg tablet; Take 1 tablet (5 mg total) by mouth daily  Dispense: 30 tablet; Refill: 1  - Synthroid 50 MCG tablet; TAKE 1 TABLET BY MOUTH  DAILY ON AN EMPTY STOMACH  EXCEPT ON SATURDAY AND  SUNDAY TAKE 2 TABLETS  Dispense: 117 tablet; Refill: 1    2  Hypothyroidism, unspecified type    - zolpidem (AMBIEN) 5 mg tablet;  Take 1 tablet (5 mg total) by mouth daily  Dispense: 30 tablet; Refill: 1  - Synthroid 50 MCG tablet; TAKE 1 TABLET BY MOUTH  DAILY ON AN EMPTY STOMACH  EXCEPT ON SATURDAY AND  SUNDAY TAKE 2 TABLETS  Dispense: 117 tablet; Refill: 1    3  Acquired hypothyroidism    - zolpidem (AMBIEN) 5 mg tablet; Take 1 tablet (5 mg total) by mouth daily  Dispense: 30 tablet; Refill: 1  - Synthroid 50 MCG tablet; TAKE 1 TABLET BY MOUTH  DAILY ON AN EMPTY STOMACH  EXCEPT ON SATURDAY AND  SUNDAY TAKE 2 TABLETS  Dispense: 117 tablet; Refill: 1    4  Insomnia, unspecified type    - zolpidem (AMBIEN) 5 mg tablet; Take 1 tablet (5 mg total) by mouth daily  Dispense: 30 tablet; Refill: 1  - Synthroid 50 MCG tablet; TAKE 1 TABLET BY MOUTH  DAILY ON AN EMPTY STOMACH  EXCEPT ON SATURDAY AND  SUNDAY TAKE 2 TABLETS  Dispense: 117 tablet; Refill: 1    5  Benign neoplasm of meninges, unspecified (HCC)    - zolpidem (AMBIEN) 5 mg tablet; Take 1 tablet (5 mg total) by mouth daily  Dispense: 30 tablet; Refill: 1       No problem-specific Assessment & Plan notes found for this encounter  Subjective:      Patient ID: [place] is a [de-identified] y o  female  HPI    The following portions of the patient's history were reviewed and updated as appropriate: She  has a past medical history of Disease of thyroid gland, GERD (gastroesophageal reflux disease), and Shoulder impingement  She   Patient Active Problem List    Diagnosis Date Noted   • Prediabetes 07/22/2021   • Cold hands 01/20/2021   • Acquired hypothyroidism 01/20/2021   • Other insomnia 01/20/2021   • Rosacea 01/20/2021   • Chronic gastritis 11/19/2020   • Diverticular disease of colon 11/19/2020   • Hemorrhoids 11/19/2020   • Meningioma, multiple ([facility] [address] ) 12/12/2017   • Essential hypertension 67/90/8983   • Helicobacter pylori gastritis 10/19/2017   • Sinus bradycardia 12/01/2014     She  has a past surgical history that includes Femur Surgery (Right); Appendectomy (1993); Oophorectomy; Hysterectomy (1993); and Breast biopsy (Left)    Her family history includes Breast cancer in her family; Breast cancer (age of onset: 44) in her sister; Colon cancer in her family; Colon cancer (age of onset: 80) in her mother; Hypertension in her family and father; No Known Problems in her maternal aunt, maternal aunt, maternal aunt, and paternal aunt; Pancreatic cancer in her sister and sister; Stroke in her family and father  She  reports that she has never smoked  She has never used smokeless tobacco  She reports that she does not drink alcohol and does not use drugs  Current Outpatient Medications   Medication Sig Dispense Refill   • amLODIPine (NORVASC) 5 mg tablet Take 1 tablet (5 mg total) by mouth 2 (two) times a day 180 tablet 1   • B Complex Vitamins (VITAMIN-B COMPLEX PO) Take 1 tablet by mouth     • Betaine HCl 300 MG TABS Take 2 tablets (600 mg total) by mouth daily as needed (indigestion) 30 tablet 0   • metroNIDAZOLE (METROGEL) 0 75 % gel APPLY TO AFFECTED AREA(S)  TOPICALLY TWICE DAILY AS  DIRECTED 135 g 1   • naproxen (NAPROSYN) 375 mg tablet As needed for pain      • pancrelipase, Lip-Prot-Amyl, (CREON) 24,000 units Take 24,000 units of lipase by mouth daily as needed for snacks 30 capsule 0   • phenazopyridine (PYRIDIUM) 200 mg tablet Take 1 tablet (200 mg total) by mouth 3 (three) times a day as needed for bladder spasms 30 tablet 0   • predniSONE 10 mg tablet      • Synthroid 50 MCG tablet TAKE 1 TABLET BY MOUTH  DAILY ON AN EMPTY STOMACH  EXCEPT ON SATURDAY AND  SUNDAY TAKE 2 TABLETS 117 tablet 1   • triamcinolone (KENALOG) 0 1 % cream Apply topically 2 (two) times a day as needed     • zolpidem (AMBIEN) 5 mg tablet Take 1 tablet (5 mg total) by mouth daily 30 tablet 1     No current facility-administered medications for this visit       Current Outpatient Medications on File Prior to Visit   Medication Sig   • B Complex Vitamins (VITAMIN-B COMPLEX PO) Take 1 tablet by mouth   • Betaine HCl 300 MG TABS Take 2 tablets (600 mg total) by mouth daily as needed (indigestion)   • metroNIDAZOLE (METROGEL) 0 75 % gel APPLY TO AFFECTED AREA(S)  TOPICALLY TWICE DAILY AS  DIRECTED   • naproxen (NAPROSYN) 375 mg tablet As needed for pain    • pancrelipase, Lip-Prot-Amyl, (CREON) 24,000 units Take 24,000 units of lipase by mouth daily as needed for snacks   • phenazopyridine (PYRIDIUM) 200 mg tablet Take 1 tablet (200 mg total) by mouth 3 (three) times a day as needed for bladder spasms   • predniSONE 10 mg tablet    • triamcinolone (KENALOG) 0 1 % cream Apply topically 2 (two) times a day as needed     No current facility-administered medications on file prior to visit  She is allergic to no active allergies       Review of Systems   Constitutional: Negative for appetite change, chills, fatigue and fever  HENT: Negative for sore throat and trouble swallowing  Eyes: Negative for redness  Respiratory: Negative for shortness of breath  Cardiovascular: Negative for chest pain and palpitations  Gastrointestinal: Negative for abdominal pain, constipation and diarrhea  Genitourinary: Negative for dysuria and hematuria  Musculoskeletal: Negative for back pain and neck pain  Skin: Negative for rash  Neurological: Negative for seizures, weakness and headaches  Hematological: Negative for adenopathy  Psychiatric/Behavioral: Negative for confusion  The patient is not nervous/anxious            Objective:      /70 (BP Location: Left arm, Patient Position: Sitting, Cuff Size: Adult)   Pulse 65   Temp 97 5 °F (36 4 °C) (Temporal)   Ht 5' 2" (1 575 m)   Wt 62 1 kg (137 lb)   SpO2 99%   BMI 25 06 kg/m²     Results Reviewed     None          Recent Results (from the past 1344 hour(s))   CBC and differential    Collection Time: 09/30/22  6:51 AM   Result Value Ref Range    WBC 6 49 4 31 - 10 16 Thousand/uL    RBC 4 51 3 81 - 5 12 Million/uL    Hemoglobin 13 1 11 5 - 15 4 g/dL    Hematocrit 40 6 34 8 - 46 1 %    MCV 90 82 - 98 fL    MCH 29 0 26 8 - 34 3 pg    MCHC 32 3 31 4 - 37 4 g/dL    RDW 13 8 11 6 - 15 1 %    MPV 9 3 8 9 - 12 7 fL    Platelets 493 811 - 677 Thousands/uL    nRBC 0 /100 WBCs    Neutrophils Relative 47 43 - 75 %    Immat GRANS % 0 0 - 2 %    Lymphocytes Relative 43 14 - 44 %    Monocytes Relative 7 4 - 12 %    Eosinophils Relative 2 0 - 6 %    Basophils Relative 1 0 - 1 %    Neutrophils Absolute 2 99 1 85 - 7 62 Thousands/µL    Immature Grans Absolute 0 02 0 00 - 0 20 Thousand/uL    Lymphocytes Absolute 2 76 0 60 - 4 47 Thousands/µL    Monocytes Absolute 0 48 0 17 - 1 22 Thousand/µL    Eosinophils Absolute 0 15 0 00 - 0 61 Thousand/µL    Basophils Absolute 0 09 0 00 - 0 10 Thousands/µL   Comprehensive metabolic panel    Collection Time: 09/30/22  6:51 AM   Result Value Ref Range    Sodium 139 135 - 147 mmol/L    Potassium 3 6 3 5 - 5 3 mmol/L    Chloride 106 96 - 108 mmol/L    CO2 27 21 - 32 mmol/L    ANION GAP 6 4 - 13 mmol/L    BUN 16 5 - 25 mg/dL    Creatinine 0 65 0 60 - 1 30 mg/dL    Glucose, Fasting 92 65 - 99 mg/dL    Calcium 8 9 8 4 - 10 2 mg/dL    AST 16 13 - 39 U/L    ALT 16 7 - 52 U/L    Alkaline Phosphatase 54 34 - 104 U/L    Total Protein 6 9 6 4 - 8 4 g/dL    Albumin 3 9 3 5 - 5 0 g/dL    Total Bilirubin 0 37 0 20 - 1 00 mg/dL    eGFR 84 ml/min/1 73sq m   T4, free    Collection Time: 09/30/22  6:51 AM   Result Value Ref Range    Free T4 1 32 0 76 - 1 46 ng/dL   TSH, 3rd generation    Collection Time: 09/30/22  6:51 AM   Result Value Ref Range    TSH 3RD GENERATON 0 653 0 450 - 4 500 uIU/mL        Physical Exam  Constitutional:       General: She is not in acute distress  Appearance: Normal appearance  HENT:      Head: Normocephalic and atraumatic  Nose: Nose normal       Mouth/Throat:      Mouth: Mucous membranes are moist    Eyes:      Extraocular Movements: Extraocular movements intact  Pupils: Pupils are equal, round, and reactive to light     Cardiovascular:      Rate and Rhythm: Normal rate and regular rhythm  Pulses: Normal pulses  Heart sounds: Normal heart sounds  No murmur heard  No friction rub  Pulmonary:      Effort: Pulmonary effort is normal  No respiratory distress  Breath sounds: Normal breath sounds  No wheezing  Abdominal:      General: Abdomen is flat  Bowel sounds are normal  There is no distension  Palpations: Abdomen is soft  There is no mass  Tenderness: There is no abdominal tenderness  There is no guarding  Musculoskeletal:         General: Normal range of motion  Neurological:      General: No focal deficit present  Mental Status: She is alert and oriented to person, place, and time  Mental status is at baseline  Cranial Nerves: No cranial nerve deficit     Psychiatric:         Mood and Affect: Mood normal          Behavior: Behavior normal  - - -

## 2023-01-09 DIAGNOSIS — E03.9 ACQUIRED HYPOTHYROIDISM: ICD-10-CM

## 2023-01-09 DIAGNOSIS — D32.9 BENIGN NEOPLASM OF MENINGES, UNSPECIFIED (HCC): ICD-10-CM

## 2023-01-09 DIAGNOSIS — G47.00 INSOMNIA, UNSPECIFIED TYPE: ICD-10-CM

## 2023-01-09 DIAGNOSIS — I10 ESSENTIAL HYPERTENSION: ICD-10-CM

## 2023-01-09 DIAGNOSIS — E03.9 HYPOTHYROIDISM, UNSPECIFIED TYPE: ICD-10-CM

## 2023-01-09 RX ORDER — ZOLPIDEM TARTRATE 5 MG/1
5 TABLET ORAL DAILY
Qty: 30 TABLET | Refills: 0 | Status: SHIPPED | OUTPATIENT
Start: 2023-01-09

## 2023-02-27 ENCOUNTER — TELEPHONE (OUTPATIENT)
Dept: INTERNAL MEDICINE CLINIC | Facility: CLINIC | Age: 83
End: 2023-02-27

## 2023-02-27 DIAGNOSIS — I10 ESSENTIAL HYPERTENSION: ICD-10-CM

## 2023-02-27 DIAGNOSIS — R73.03 PREDIABETES: Primary | ICD-10-CM

## 2023-02-27 DIAGNOSIS — E03.9 ACQUIRED HYPOTHYROIDISM: ICD-10-CM

## 2023-03-01 ENCOUNTER — APPOINTMENT (OUTPATIENT)
Dept: LAB | Facility: CLINIC | Age: 83
End: 2023-03-01

## 2023-03-01 DIAGNOSIS — I10 ESSENTIAL HYPERTENSION: ICD-10-CM

## 2023-03-01 DIAGNOSIS — E03.9 ACQUIRED HYPOTHYROIDISM: ICD-10-CM

## 2023-03-01 DIAGNOSIS — R73.03 PREDIABETES: ICD-10-CM

## 2023-03-01 LAB
ALBUMIN SERPL BCP-MCNC: 4.1 G/DL (ref 3.5–5)
ALP SERPL-CCNC: 55 U/L (ref 34–104)
ALT SERPL W P-5'-P-CCNC: 23 U/L (ref 7–52)
ANION GAP SERPL CALCULATED.3IONS-SCNC: 5 MMOL/L (ref 4–13)
AST SERPL W P-5'-P-CCNC: 20 U/L (ref 13–39)
BASOPHILS # BLD AUTO: 0.08 THOUSANDS/ÂΜL (ref 0–0.1)
BASOPHILS NFR BLD AUTO: 1 % (ref 0–1)
BILIRUB SERPL-MCNC: 0.4 MG/DL (ref 0.2–1)
BUN SERPL-MCNC: 17 MG/DL (ref 5–25)
CALCIUM SERPL-MCNC: 9 MG/DL (ref 8.4–10.2)
CHLORIDE SERPL-SCNC: 108 MMOL/L (ref 96–108)
CO2 SERPL-SCNC: 25 MMOL/L (ref 21–32)
CREAT SERPL-MCNC: 0.63 MG/DL (ref 0.6–1.3)
EOSINOPHIL # BLD AUTO: 0.21 THOUSAND/ÂΜL (ref 0–0.61)
EOSINOPHIL NFR BLD AUTO: 3 % (ref 0–6)
ERYTHROCYTE [DISTWIDTH] IN BLOOD BY AUTOMATED COUNT: 13.8 % (ref 11.6–15.1)
EST. AVERAGE GLUCOSE BLD GHB EST-MCNC: 108 MG/DL
GFR SERPL CREATININE-BSD FRML MDRD: 84 ML/MIN/1.73SQ M
GLUCOSE P FAST SERPL-MCNC: 87 MG/DL (ref 65–99)
HBA1C MFR BLD: 5.4 %
HCT VFR BLD AUTO: 39.1 % (ref 34.8–46.1)
HGB BLD-MCNC: 12.5 G/DL (ref 11.5–15.4)
IMM GRANULOCYTES # BLD AUTO: 0.02 THOUSAND/UL (ref 0–0.2)
IMM GRANULOCYTES NFR BLD AUTO: 0 % (ref 0–2)
LYMPHOCYTES # BLD AUTO: 2.99 THOUSANDS/ÂΜL (ref 0.6–4.47)
LYMPHOCYTES NFR BLD AUTO: 47 % (ref 14–44)
MCH RBC QN AUTO: 28.9 PG (ref 26.8–34.3)
MCHC RBC AUTO-ENTMCNC: 32 G/DL (ref 31.4–37.4)
MCV RBC AUTO: 91 FL (ref 82–98)
MONOCYTES # BLD AUTO: 0.55 THOUSAND/ÂΜL (ref 0.17–1.22)
MONOCYTES NFR BLD AUTO: 9 % (ref 4–12)
NEUTROPHILS # BLD AUTO: 2.58 THOUSANDS/ÂΜL (ref 1.85–7.62)
NEUTS SEG NFR BLD AUTO: 40 % (ref 43–75)
NRBC BLD AUTO-RTO: 0 /100 WBCS
PLATELET # BLD AUTO: 269 THOUSANDS/UL (ref 149–390)
PMV BLD AUTO: 9.9 FL (ref 8.9–12.7)
POTASSIUM SERPL-SCNC: 3.7 MMOL/L (ref 3.5–5.3)
PROT SERPL-MCNC: 7.3 G/DL (ref 6.4–8.4)
RBC # BLD AUTO: 4.32 MILLION/UL (ref 3.81–5.12)
SODIUM SERPL-SCNC: 138 MMOL/L (ref 135–147)
T4 FREE SERPL-MCNC: 1.16 NG/DL (ref 0.76–1.46)
TSH SERPL DL<=0.05 MIU/L-ACNC: 2.13 UIU/ML (ref 0.45–4.5)
WBC # BLD AUTO: 6.43 THOUSAND/UL (ref 4.31–10.16)

## 2023-03-06 ENCOUNTER — RA CDI HCC (OUTPATIENT)
Dept: OTHER | Facility: HOSPITAL | Age: 83
End: 2023-03-06

## 2023-03-06 NOTE — PROGRESS NOTES
[facility] [address]  coding opportunities       Chart reviewed, no opportunity found: CHART REVIEWED, NO OPPORTUNITY FOUND        Patients Insurance     Medicare Insurance: Medicare

## 2023-03-13 ENCOUNTER — OFFICE VISIT (OUTPATIENT)
Dept: INTERNAL MEDICINE CLINIC | Facility: CLINIC | Age: 83
End: 2023-03-13

## 2023-03-13 VITALS
BODY MASS INDEX: 25.4 KG/M2 | OXYGEN SATURATION: 99 % | HEIGHT: 62 IN | DIASTOLIC BLOOD PRESSURE: 63 MMHG | HEART RATE: 66 BPM | WEIGHT: 138 LBS | SYSTOLIC BLOOD PRESSURE: 137 MMHG | TEMPERATURE: 98.4 F

## 2023-03-13 DIAGNOSIS — E03.9 HYPOTHYROIDISM, UNSPECIFIED TYPE: ICD-10-CM

## 2023-03-13 DIAGNOSIS — D32.9 BENIGN NEOPLASM OF MENINGES, UNSPECIFIED (HCC): ICD-10-CM

## 2023-03-13 DIAGNOSIS — I10 ESSENTIAL HYPERTENSION: Primary | ICD-10-CM

## 2023-03-13 DIAGNOSIS — R35.0 FREQUENCY OF MICTURITION: ICD-10-CM

## 2023-03-13 DIAGNOSIS — G47.00 INSOMNIA, UNSPECIFIED TYPE: ICD-10-CM

## 2023-03-13 RX ORDER — AMLODIPINE BESYLATE 5 MG/1
5 TABLET ORAL DAILY
Qty: 90 TABLET | Refills: 1 | Status: SHIPPED | OUTPATIENT
Start: 2023-03-13

## 2023-03-13 RX ORDER — LEVOTHYROXINE SODIUM 0.05 MG/1
50 TABLET ORAL
Qty: 90 TABLET | Refills: 1 | Status: SHIPPED | OUTPATIENT
Start: 2023-03-13 | End: 2023-03-13

## 2023-03-13 RX ORDER — LEVOTHYROXINE SODIUM 50 MCG
TABLET ORAL
Qty: 117 TABLET | Refills: 3 | Status: CANCELLED | OUTPATIENT
Start: 2023-03-13

## 2023-03-13 RX ORDER — AMLODIPINE BESYLATE 5 MG/1
5 TABLET ORAL 2 TIMES DAILY
Qty: 180 TABLET | Refills: 3 | Status: CANCELLED | OUTPATIENT
Start: 2023-03-13

## 2023-03-13 RX ORDER — METRONIDAZOLE 7.5 MG/G
GEL TOPICAL 2 TIMES DAILY
Qty: 135 G | Refills: 1 | Status: CANCELLED | OUTPATIENT
Start: 2023-03-13

## 2023-03-13 RX ORDER — LEVOTHYROXINE SODIUM 0.05 MG/1
50 TABLET ORAL
Qty: 117 TABLET | Refills: 1 | Status: SHIPPED | OUTPATIENT
Start: 2023-03-13 | End: 2023-06-11

## 2023-03-13 RX ORDER — ZOLPIDEM TARTRATE 5 MG/1
5 TABLET ORAL DAILY
Qty: 30 TABLET | Refills: 1 | Status: SHIPPED | OUTPATIENT
Start: 2023-03-13

## 2023-03-13 NOTE — PROGRESS NOTES
Assessment/Plan:           1  Essential hypertension  Comments:  Stable continue amlodipine 5 mg daily  Orders:  -     amLODIPine (NORVASC) 5 mg tablet; Take 1 tablet (5 mg total) by mouth daily    2  Hypothyroidism, unspecified type  Comments:  Labs are reviewed continue levothyroxine at the current dosage  Orders:  -     levothyroxine (Synthroid) 50 mcg tablet; Take 1 tablet (50 mcg total) by mouth daily in the early morning TAKE 1 TABLET BY MOUTH DAILY ON  AN EMPTY STOMACH EXCEPT ON  SATURDAY AND SUNDAY TAKE 2  TABLETS    3  Insomnia, unspecified type  Comments:  As needed zolpidem  Advised limiting benzodiazepines  Orders:  -     zolpidem (AMBIEN) 5 mg tablet; Take 1 tablet (5 mg total) by mouth daily    4  Benign neoplasm of meninges, unspecified ([facility] [address] )  Comments: We will continue to monitor  5  Frequency of micturition  -     Urinalysis with microscopic  -     Urine culture; Future         1  Essential hypertension    - amLODIPine (NORVASC) 5 mg tablet; Take 1 tablet (5 mg total) by mouth daily  Dispense: 90 tablet; Refill: 1    2  Hypothyroidism, unspecified type    - levothyroxine (Synthroid) 50 mcg tablet; Take 1 tablet (50 mcg total) by mouth daily in the early morning  Dispense: 90 tablet; Refill: 1    3  Acquired hypothyroidism      4  Insomnia, unspecified type    - zolpidem (AMBIEN) 5 mg tablet; Take 1 tablet (5 mg total) by mouth daily  Dispense: 30 tablet; Refill: 1    5  Benign neoplasm of meninges, unspecified ([facility] [address] )      6  Frequency of micturition  - Urinalysis with microscopic  - Urine culture; Future       No problem-specific Assessment & Plan notes found for this encounter  Subjective:      Patient ID: Prem Escamilla is a 82 y o  female  HPI    The following portions of the patient's history were reviewed and updated as appropriate: She  has a past medical history of Disease of thyroid gland, GERD (gastroesophageal reflux disease), and Shoulder impingement    She   Patient Active Problem List    Diagnosis Date Noted   • Prediabetes 07/22/2021   • Cold hands 01/20/2021   • Acquired hypothyroidism 01/20/2021   • Other insomnia 01/20/2021   • Rosacea 01/20/2021   • Chronic gastritis 11/19/2020   • Diverticular disease of colon 11/19/2020   • Hemorrhoids 11/19/2020   • Meningioma, multiple ([address] ) 12/12/2017   • Essential hypertension 16/94/6579   • Helicobacter pylori gastritis 10/19/2017   • Sinus bradycardia 12/01/2014     She  has a past surgical history that includes Femur Surgery (Right); Appendectomy (1993); Oophorectomy; Hysterectomy (1993); and Breast biopsy (Left)  Her family history includes Breast cancer in her family; Breast cancer (age of onset: 44) in her sister; Colon cancer in her family; Colon cancer (age of onset: 80) in her mother; Hypertension in her family and father; No Known Problems in her maternal aunt, maternal aunt, maternal aunt, and paternal aunt; Pancreatic cancer in her sister and sister; Stroke in her family and father  She  reports that she has never smoked  She has never used smokeless tobacco  She reports that she does not drink alcohol and does not use drugs    Current Outpatient Medications   Medication Sig Dispense Refill   • amLODIPine (NORVASC) 5 mg tablet Take 1 tablet (5 mg total) by mouth daily 90 tablet 1   • B Complex Vitamins (VITAMIN-B COMPLEX PO) Take 1 tablet by mouth     • Betaine HCl 300 MG TABS Take 2 tablets (600 mg total) by mouth daily as needed (indigestion) 30 tablet 0   • levothyroxine (Synthroid) 50 mcg tablet Take 1 tablet (50 mcg total) by mouth daily in the early morning TAKE 1 TABLET BY MOUTH DAILY ON  AN EMPTY STOMACH EXCEPT ON  SATURDAY AND SUNDAY TAKE 2  TABLETS 117 tablet 1   • metroNIDAZOLE (METROGEL) 0 75 % gel APPLY TO AFFECTED AREA(S)  TOPICALLY TWICE DAILY AS  DIRECTED 135 g 1   • naproxen (NAPROSYN) 375 mg tablet As needed for pain      • pancrelipase, Lip-Prot-Amyl, (CREON) 24,000 units Take 24,000 units of lipase by mouth daily as needed for snacks 30 capsule 0   • phenazopyridine (PYRIDIUM) 200 mg tablet Take 1 tablet (200 mg total) by mouth 3 (three) times a day as needed for bladder spasms 30 tablet 0   • triamcinolone (KENALOG) 0 1 % cream Apply topically 2 (two) times a day as needed     • zolpidem (AMBIEN) 5 mg tablet Take 1 tablet (5 mg total) by mouth daily 30 tablet 1   • predniSONE 10 mg tablet  (Patient not taking: Reported on 3/13/2023)       No current facility-administered medications for this visit  Current Outpatient Medications on File Prior to Visit   Medication Sig   • B Complex Vitamins (VITAMIN-B COMPLEX PO) Take 1 tablet by mouth   • Betaine HCl 300 MG TABS Take 2 tablets (600 mg total) by mouth daily as needed (indigestion)   • metroNIDAZOLE (METROGEL) 0 75 % gel APPLY TO AFFECTED AREA(S)  TOPICALLY TWICE DAILY AS  DIRECTED   • naproxen (NAPROSYN) 375 mg tablet As needed for pain    • pancrelipase, Lip-Prot-Amyl, (CREON) 24,000 units Take 24,000 units of lipase by mouth daily as needed for snacks   • phenazopyridine (PYRIDIUM) 200 mg tablet Take 1 tablet (200 mg total) by mouth 3 (three) times a day as needed for bladder spasms   • triamcinolone (KENALOG) 0 1 % cream Apply topically 2 (two) times a day as needed   • [DISCONTINUED] amLODIPine (NORVASC) 5 mg tablet TAKE 1 TABLET BY MOUTH TWICE  DAILY   • [DISCONTINUED] Synthroid 50 MCG tablet TAKE 1 TABLET BY MOUTH DAILY ON  AN EMPTY STOMACH EXCEPT ON  SATURDAY AND SUNDAY TAKE 2  TABLETS   • [DISCONTINUED] zolpidem (AMBIEN) 5 mg tablet Take 1 tablet (5 mg total) by mouth daily   • predniSONE 10 mg tablet  (Patient not taking: Reported on 3/13/2023)     No current facility-administered medications on file prior to visit  She is allergic to no active allergies       Review of Systems   Constitutional: Negative for appetite change, chills, fatigue and fever  HENT: Negative for sore throat and trouble swallowing  Eyes: Negative for redness  Respiratory: Negative for shortness of breath  Cardiovascular: Negative for chest pain and palpitations  Gastrointestinal: Negative for abdominal pain, constipation and diarrhea  Genitourinary: Negative for dysuria and hematuria  Musculoskeletal: Negative for back pain and neck pain  Skin: Negative for rash  Neurological: Negative for seizures, weakness and headaches  Hematological: Negative for adenopathy  Psychiatric/Behavioral: Negative for confusion  The patient is not nervous/anxious            Objective:      /63 (BP Location: Left arm, Patient Position: Sitting, Cuff Size: Adult)   Pulse 66   Temp 98 4 °F (36 9 °C) (Temporal)   Ht 5' 2" (1 575 m)   Wt 62 6 kg (138 lb)   SpO2 99%   BMI 25 24 kg/m²     Results Reviewed     None          Recent Results (from the past 1344 hour(s))   CBC and differential    Collection Time: 03/01/23  6:47 AM   Result Value Ref Range    WBC 6 43 4 31 - 10 16 Thousand/uL    RBC 4 32 3 81 - 5 12 Million/uL    Hemoglobin 12 5 11 5 - 15 4 g/dL    Hematocrit 39 1 34 8 - 46 1 %    MCV 91 82 - 98 fL    MCH 28 9 26 8 - 34 3 pg    MCHC 32 0 31 4 - 37 4 g/dL    RDW 13 8 11 6 - 15 1 %    MPV 9 9 8 9 - 12 7 fL    Platelets 746 346 - 817 Thousands/uL    nRBC 0 /100 WBCs    Neutrophils Relative 40 (L) 43 - 75 %    Immat GRANS % 0 0 - 2 %    Lymphocytes Relative 47 (H) 14 - 44 %    Monocytes Relative 9 4 - 12 %    Eosinophils Relative 3 0 - 6 %    Basophils Relative 1 0 - 1 %    Neutrophils Absolute 2 58 1 85 - 7 62 Thousands/µL    Immature Grans Absolute 0 02 0 00 - 0 20 Thousand/uL    Lymphocytes Absolute 2 99 0 60 - 4 47 Thousands/µL    Monocytes Absolute 0 55 0 17 - 1 22 Thousand/µL    Eosinophils Absolute 0 21 0 00 - 0 61 Thousand/µL    Basophils Absolute 0 08 0 00 - 0 10 Thousands/µL   Comprehensive metabolic panel    Collection Time: 03/01/23  6:47 AM   Result Value Ref Range    Sodium 138 135 - 147 mmol/L    Potassium 3 7 3 5 - 5 3 mmol/L    Chloride 108 96 - 108 mmol/L    CO2 25 21 - 32 mmol/L    ANION GAP 5 4 - 13 mmol/L    BUN 17 5 - 25 mg/dL    Creatinine 0 63 0 60 - 1 30 mg/dL    Glucose, Fasting 87 65 - 99 mg/dL    Calcium 9 0 8 4 - 10 2 mg/dL    AST 20 13 - 39 U/L    ALT 23 7 - 52 U/L    Alkaline Phosphatase 55 34 - 104 U/L    Total Protein 7 3 6 4 - 8 4 g/dL    Albumin 4 1 3 5 - 5 0 g/dL    Total Bilirubin 0 40 0 20 - 1 00 mg/dL    eGFR 84 ml/min/1 73sq m   Hemoglobin A1C    Collection Time: 03/01/23  6:47 AM   Result Value Ref Range    Hemoglobin A1C 5 4 Normal 3 8-5 6%; PreDiabetic 5 7-6 4%; Diabetic >=6 5%; Glycemic control for adults with diabetes <7 0% %     mg/dl   T4, free    Collection Time: 03/01/23  6:47 AM   Result Value Ref Range    Free T4 1 16 0 76 - 1 46 ng/dL   TSH, 3rd generation    Collection Time: 03/01/23  6:47 AM   Result Value Ref Range    TSH 3RD GENERATON 2 134 0 450 - 4 500 uIU/mL        Physical Exam  Constitutional:       General: She is not in acute distress  Appearance: Normal appearance  HENT:      Head: Normocephalic and atraumatic  Nose: Nose normal       Mouth/Throat:      Mouth: Mucous membranes are moist    Eyes:      Extraocular Movements: Extraocular movements intact  Pupils: Pupils are equal, round, and reactive to light  Cardiovascular:      Rate and Rhythm: Normal rate and regular rhythm  Pulses: Normal pulses  Heart sounds: Normal heart sounds  No murmur heard  No friction rub  Pulmonary:      Effort: Pulmonary effort is normal  No respiratory distress  Breath sounds: Normal breath sounds  No wheezing  Abdominal:      General: Abdomen is flat  Bowel sounds are normal  There is no distension  Palpations: Abdomen is soft  There is no mass  Tenderness: There is no abdominal tenderness  There is no guarding  Musculoskeletal:         General: Normal range of motion  Cervical back: Normal range of motion  Neurological:      General: No focal deficit present  Mental Status: She is alert and oriented to person, place, and time  Mental status is at baseline  Cranial Nerves: No cranial nerve deficit     Psychiatric:         Mood and Affect: Mood normal          Behavior: Behavior normal

## 2023-03-16 ENCOUNTER — APPOINTMENT (OUTPATIENT)
Dept: LAB | Facility: CLINIC | Age: 83
End: 2023-03-16

## 2023-03-16 DIAGNOSIS — R35.0 FREQUENCY OF MICTURITION: ICD-10-CM

## 2023-03-16 LAB
BACTERIA UR QL AUTO: ABNORMAL /HPF
BILIRUB UR QL STRIP: NEGATIVE
CLARITY UR: ABNORMAL
COLOR UR: YELLOW
GLUCOSE UR STRIP-MCNC: NEGATIVE MG/DL
HGB UR QL STRIP.AUTO: NEGATIVE
KETONES UR STRIP-MCNC: NEGATIVE MG/DL
LEUKOCYTE ESTERASE UR QL STRIP: ABNORMAL
MUCOUS THREADS UR QL AUTO: ABNORMAL
NITRITE UR QL STRIP: POSITIVE
NON-SQ EPI CELLS URNS QL MICRO: ABNORMAL /HPF
PH UR STRIP.AUTO: 7 [PH]
PROT UR STRIP-MCNC: NEGATIVE MG/DL
RBC #/AREA URNS AUTO: ABNORMAL /HPF
SP GR UR STRIP.AUTO: 1.02 (ref 1–1.03)
UROBILINOGEN UR STRIP-ACNC: <2 MG/DL
WBC #/AREA URNS AUTO: ABNORMAL /HPF

## 2023-03-19 LAB
BACTERIA UR CULT: ABNORMAL
BACTERIA UR CULT: ABNORMAL

## 2023-03-20 ENCOUNTER — TELEPHONE (OUTPATIENT)
Dept: INTERNAL MEDICINE CLINIC | Facility: CLINIC | Age: 83
End: 2023-03-20

## 2023-03-20 DIAGNOSIS — N30.00 ACUTE CYSTITIS WITHOUT HEMATURIA: Primary | ICD-10-CM

## 2023-03-20 RX ORDER — AMPICILLIN 500 MG/1
500 CAPSULE ORAL 4 TIMES DAILY
Qty: 20 CAPSULE | Refills: 0 | Status: SHIPPED | OUTPATIENT
Start: 2023-03-20 | End: 2023-03-25

## 2023-07-07 ENCOUNTER — TELEPHONE (OUTPATIENT)
Dept: INTERNAL MEDICINE CLINIC | Facility: CLINIC | Age: 83
End: 2023-07-07

## 2023-07-07 DIAGNOSIS — E03.9 ACQUIRED HYPOTHYROIDISM: ICD-10-CM

## 2023-07-07 DIAGNOSIS — E11.9 DIABETES MELLITUS WITHOUT COMPLICATION (HCC): ICD-10-CM

## 2023-07-07 DIAGNOSIS — I10 ESSENTIAL HYPERTENSION, MALIGNANT: ICD-10-CM

## 2023-07-07 DIAGNOSIS — E78.41 ELEVATED LIPOPROTEIN A LEVEL: Primary | ICD-10-CM

## 2023-07-07 DIAGNOSIS — Z00.01 ENCOUNTER FOR GENERAL ADULT MEDICAL EXAMINATION WITH ABNORMAL FINDINGS: ICD-10-CM

## 2023-07-07 DIAGNOSIS — I10 ESSENTIAL HYPERTENSION, BENIGN: ICD-10-CM

## 2023-07-07 NOTE — TELEPHONE ENCOUNTER
patients  called and stated his wife needs all lab work before her ceci on the 17 th   Cbc  urinalysis    CMP   A 1 C   T 4   TSH   Lipid

## 2023-07-13 ENCOUNTER — APPOINTMENT (OUTPATIENT)
Dept: LAB | Facility: CLINIC | Age: 83
End: 2023-07-13
Payer: MEDICARE

## 2023-07-13 DIAGNOSIS — E11.9 DIABETES MELLITUS WITHOUT COMPLICATION (HCC): ICD-10-CM

## 2023-07-13 DIAGNOSIS — I10 ESSENTIAL HYPERTENSION, BENIGN: ICD-10-CM

## 2023-07-13 DIAGNOSIS — E78.41 ELEVATED LIPOPROTEIN A LEVEL: ICD-10-CM

## 2023-07-13 DIAGNOSIS — I10 ESSENTIAL HYPERTENSION, MALIGNANT: ICD-10-CM

## 2023-07-13 DIAGNOSIS — E03.9 ACQUIRED HYPOTHYROIDISM: ICD-10-CM

## 2023-07-13 LAB
ALBUMIN SERPL BCP-MCNC: 4 G/DL (ref 3.5–5)
ALP SERPL-CCNC: 45 U/L (ref 34–104)
ALT SERPL W P-5'-P-CCNC: 16 U/L (ref 7–52)
ANION GAP SERPL CALCULATED.3IONS-SCNC: 7 MMOL/L
AST SERPL W P-5'-P-CCNC: 15 U/L (ref 13–39)
BACTERIA UR QL AUTO: ABNORMAL /HPF
BASOPHILS # BLD AUTO: 0.1 THOUSANDS/ÂΜL (ref 0–0.1)
BASOPHILS NFR BLD AUTO: 2 % (ref 0–1)
BILIRUB SERPL-MCNC: 0.41 MG/DL (ref 0.2–1)
BILIRUB UR QL STRIP: NEGATIVE
BUN SERPL-MCNC: 24 MG/DL (ref 5–25)
CALCIUM SERPL-MCNC: 9 MG/DL (ref 8.4–10.2)
CHLORIDE SERPL-SCNC: 107 MMOL/L (ref 96–108)
CHOLEST SERPL-MCNC: 193 MG/DL
CLARITY UR: ABNORMAL
CO2 SERPL-SCNC: 25 MMOL/L (ref 21–32)
COLOR UR: ABNORMAL
CREAT SERPL-MCNC: 0.65 MG/DL (ref 0.6–1.3)
EOSINOPHIL # BLD AUTO: 0.18 THOUSAND/ÂΜL (ref 0–0.61)
EOSINOPHIL NFR BLD AUTO: 3 % (ref 0–6)
ERYTHROCYTE [DISTWIDTH] IN BLOOD BY AUTOMATED COUNT: 14.4 % (ref 11.6–15.1)
EST. AVERAGE GLUCOSE BLD GHB EST-MCNC: 120 MG/DL
GFR SERPL CREATININE-BSD FRML MDRD: 83 ML/MIN/1.73SQ M
GLUCOSE P FAST SERPL-MCNC: 93 MG/DL (ref 65–99)
GLUCOSE UR STRIP-MCNC: NEGATIVE MG/DL
HBA1C MFR BLD: 5.8 %
HCT VFR BLD AUTO: 38.9 % (ref 34.8–46.1)
HDLC SERPL-MCNC: 59 MG/DL
HGB BLD-MCNC: 12.4 G/DL (ref 11.5–15.4)
HGB UR QL STRIP.AUTO: NEGATIVE
IMM GRANULOCYTES # BLD AUTO: 0.01 THOUSAND/UL (ref 0–0.2)
IMM GRANULOCYTES NFR BLD AUTO: 0 % (ref 0–2)
KETONES UR STRIP-MCNC: NEGATIVE MG/DL
LDLC SERPL CALC-MCNC: 114 MG/DL (ref 0–100)
LEUKOCYTE ESTERASE UR QL STRIP: ABNORMAL
LYMPHOCYTES # BLD AUTO: 2.73 THOUSANDS/ÂΜL (ref 0.6–4.47)
LYMPHOCYTES NFR BLD AUTO: 42 % (ref 14–44)
MCH RBC QN AUTO: 28.6 PG (ref 26.8–34.3)
MCHC RBC AUTO-ENTMCNC: 31.9 G/DL (ref 31.4–37.4)
MCV RBC AUTO: 90 FL (ref 82–98)
MONOCYTES # BLD AUTO: 0.53 THOUSAND/ÂΜL (ref 0.17–1.22)
MONOCYTES NFR BLD AUTO: 8 % (ref 4–12)
MUCOUS THREADS UR QL AUTO: ABNORMAL
NEUTROPHILS # BLD AUTO: 2.97 THOUSANDS/ÂΜL (ref 1.85–7.62)
NEUTS SEG NFR BLD AUTO: 45 % (ref 43–75)
NITRITE UR QL STRIP: POSITIVE
NON-SQ EPI CELLS URNS QL MICRO: ABNORMAL /HPF
NONHDLC SERPL-MCNC: 134 MG/DL
NRBC BLD AUTO-RTO: 0 /100 WBCS
PH UR STRIP.AUTO: 6.5 [PH]
PLATELET # BLD AUTO: 256 THOUSANDS/UL (ref 149–390)
PMV BLD AUTO: 9.8 FL (ref 8.9–12.7)
POTASSIUM SERPL-SCNC: 3.8 MMOL/L (ref 3.5–5.3)
PROT SERPL-MCNC: 6.9 G/DL (ref 6.4–8.4)
PROT UR STRIP-MCNC: NEGATIVE MG/DL
RBC # BLD AUTO: 4.33 MILLION/UL (ref 3.81–5.12)
RBC #/AREA URNS AUTO: ABNORMAL /HPF
SODIUM SERPL-SCNC: 139 MMOL/L (ref 135–147)
SP GR UR STRIP.AUTO: 1.01 (ref 1–1.03)
T4 FREE SERPL-MCNC: 1.04 NG/DL (ref 0.61–1.12)
TRIGL SERPL-MCNC: 100 MG/DL
TSH SERPL DL<=0.05 MIU/L-ACNC: 2.51 UIU/ML (ref 0.45–4.5)
UROBILINOGEN UR STRIP-ACNC: <2 MG/DL
WBC # BLD AUTO: 6.52 THOUSAND/UL (ref 4.31–10.16)
WBC #/AREA URNS AUTO: ABNORMAL /HPF

## 2023-07-13 PROCEDURE — 83036 HEMOGLOBIN GLYCOSYLATED A1C: CPT

## 2023-07-13 PROCEDURE — 36415 COLL VENOUS BLD VENIPUNCTURE: CPT

## 2023-07-13 PROCEDURE — 84439 ASSAY OF FREE THYROXINE: CPT

## 2023-07-13 PROCEDURE — 85025 COMPLETE CBC W/AUTO DIFF WBC: CPT

## 2023-07-13 PROCEDURE — 81001 URINALYSIS AUTO W/SCOPE: CPT | Performed by: INTERNAL MEDICINE

## 2023-07-13 PROCEDURE — 80061 LIPID PANEL: CPT

## 2023-07-13 PROCEDURE — 84443 ASSAY THYROID STIM HORMONE: CPT

## 2023-07-13 PROCEDURE — 80053 COMPREHEN METABOLIC PANEL: CPT

## 2023-07-17 ENCOUNTER — OFFICE VISIT (OUTPATIENT)
Dept: INTERNAL MEDICINE CLINIC | Facility: CLINIC | Age: 83
End: 2023-07-17
Payer: MEDICARE

## 2023-07-17 VITALS
SYSTOLIC BLOOD PRESSURE: 130 MMHG | TEMPERATURE: 98.6 F | WEIGHT: 137 LBS | HEART RATE: 63 BPM | HEIGHT: 62 IN | OXYGEN SATURATION: 99 % | DIASTOLIC BLOOD PRESSURE: 80 MMHG | BODY MASS INDEX: 25.21 KG/M2

## 2023-07-17 DIAGNOSIS — E03.9 ACQUIRED HYPOTHYROIDISM: Primary | ICD-10-CM

## 2023-07-17 DIAGNOSIS — Z00.00 MEDICARE ANNUAL WELLNESS VISIT, SUBSEQUENT: ICD-10-CM

## 2023-07-17 DIAGNOSIS — L71.9 ROSACEA: ICD-10-CM

## 2023-07-17 DIAGNOSIS — D32.9 BENIGN NEOPLASM OF MENINGES, UNSPECIFIED (HCC): ICD-10-CM

## 2023-07-17 DIAGNOSIS — E78.2 MIXED HYPERLIPIDEMIA: ICD-10-CM

## 2023-07-17 DIAGNOSIS — I10 ESSENTIAL HYPERTENSION: ICD-10-CM

## 2023-07-17 DIAGNOSIS — I10 ESSENTIAL HYPERTENSION, MALIGNANT: ICD-10-CM

## 2023-07-17 DIAGNOSIS — R73.01 IMPAIRED FASTING BLOOD SUGAR: ICD-10-CM

## 2023-07-17 DIAGNOSIS — E03.9 HYPOTHYROIDISM, UNSPECIFIED TYPE: ICD-10-CM

## 2023-07-17 DIAGNOSIS — G47.00 INSOMNIA, UNSPECIFIED TYPE: ICD-10-CM

## 2023-07-17 PROCEDURE — G0438 PPPS, INITIAL VISIT: HCPCS | Performed by: INTERNAL MEDICINE

## 2023-07-17 PROCEDURE — 99214 OFFICE O/P EST MOD 30 MIN: CPT | Performed by: INTERNAL MEDICINE

## 2023-07-17 RX ORDER — METRONIDAZOLE 7.5 MG/G
GEL TOPICAL 2 TIMES DAILY
Qty: 135 G | Refills: 1 | Status: SHIPPED | OUTPATIENT
Start: 2023-07-17

## 2023-07-17 NOTE — PROGRESS NOTES
Assessment and Plan:     Problem List Items Addressed This Visit        Endocrine    Acquired hypothyroidism - Primary    Relevant Medications    metroNIDAZOLE (METROGEL) 0.75 % gel    Other Relevant Orders    Hemoglobin A1C       Cardiovascular and Mediastinum    Essential hypertension    Relevant Medications    metroNIDAZOLE (METROGEL) 0.75 % gel       Musculoskeletal and Integument    Rosacea    Relevant Medications    metroNIDAZOLE (METROGEL) 0.75 % gel   Other Visit Diagnoses     Essential hypertension, malignant        Stable continue amlodipine. Relevant Orders    CBC and differential    Comprehensive metabolic panel    Urinalysis with microscopic    Insomnia, unspecified type        Relevant Medications    metroNIDAZOLE (METROGEL) 0.75 % gel    Benign neoplasm of meninges, unspecified (HCC)        This is clinically stable. Relevant Medications    metroNIDAZOLE (METROGEL) 0.75 % gel    Hypothyroidism, unspecified type        Relevant Medications    metroNIDAZOLE (METROGEL) 0.75 % gel    Mixed hyperlipidemia        Relevant Orders    Lipid panel    Impaired fasting blood sugar        Relevant Orders    Hemoglobin A1C    Medicare annual wellness visit, subsequent                1. Acquired hypothyroidism  metroNIDAZOLE (METROGEL) 0.75 % gel    Hemoglobin A1C    Labs are reviewed continue levothyroxine. 2. Rosacea        3. Essential hypertension, malignant  CBC and differential    Comprehensive metabolic panel    Urinalysis with microscopic    Stable continue amlodipine. 4. Essential hypertension  metroNIDAZOLE (METROGEL) 0.75 % gel      5. Insomnia, unspecified type  metroNIDAZOLE (METROGEL) 0.75 % gel      6. Benign neoplasm of meninges, unspecified (HCC)  metroNIDAZOLE (METROGEL) 0.75 % gel    This is clinically stable. 7. Hypothyroidism, unspecified type  metroNIDAZOLE (METROGEL) 0.75 % gel      8. Mixed hyperlipidemia  Lipid panel      9.  Impaired fasting blood sugar  Hemoglobin A1C 10. Medicare annual wellness visit, subsequent            Preventive health issues were discussed with patient, and age appropriate screening tests were ordered as noted in patient's After Visit Summary. Personalized health advice and appropriate referrals for health education or preventive services given if needed, as noted in patient's After Visit Summary. History of Present Illness:     Patient presents for a Medicare Wellness Visit    This is a 82-year-old lady with a history of hypertension hypothyroidism cognitive disorder. Currently she denies any chest pain or shortness of breath. Patient Care Team:  Susan Ravi MD as PCP - General (Internal Medicine)     Review of Systems:     Review of Systems   Constitutional: Negative for appetite change, chills, fatigue and fever. HENT: Negative for sore throat and trouble swallowing. Eyes: Negative for redness. Respiratory: Negative for shortness of breath. Cardiovascular: Negative for chest pain and palpitations. Gastrointestinal: Negative for abdominal pain, constipation and diarrhea. Genitourinary: Negative for dysuria and hematuria. Musculoskeletal: Negative for back pain and neck pain. Skin: Negative for rash. Neurological: Negative for seizures, weakness and headaches. Hematological: Negative for adenopathy. Psychiatric/Behavioral: Negative for confusion. The patient is not nervous/anxious.          Problem List:     Patient Active Problem List   Diagnosis   • Meningioma, multiple ([address])   • Essential hypertension   • Chronic gastritis   • Diverticular disease of colon   • Helicobacter pylori gastritis   • Hemorrhoids   • Cold hands   • Acquired hypothyroidism   • Other insomnia   • Rosacea   • Prediabetes   • Sinus bradycardia      Past Medical and Surgical History:     Past Medical History:   Diagnosis Date   • Disease of thyroid gland    • GERD (gastroesophageal reflux disease)    • Shoulder impingement      Past Surgical History:   Procedure Laterality Date   • APPENDECTOMY  1993   • BREAST BIOPSY Left     30 years ago, benign   • FEMUR SURGERY Right    • HYSTERECTOMY  1993    TAHBSO   • OOPHORECTOMY        Family History:     Family History   Problem Relation Age of Onset   • Colon cancer Mother 80   • Hypertension Father    • Stroke Father    • Breast cancer Sister 44   • Pancreatic cancer Sister    • Pancreatic cancer Sister    • Breast cancer Family    • Colon cancer Family    • Stroke Family    • Hypertension Family    • No Known Problems Maternal Aunt    • No Known Problems Maternal Aunt    • No Known Problems Maternal Aunt    • No Known Problems Paternal Aunt    • Alcohol abuse Neg Hx    • Substance Abuse Neg Hx    • Mental illness Neg Hx    • Depression Neg Hx       Social History:     Social History     Socioeconomic History   • Marital status: /Civil Union     Spouse name: None   • Number of children: None   • Years of education: None   • Highest education level: None   Occupational History   • Occupation: retired   Tobacco Use   • Smoking status: Never   • Smokeless tobacco: Never   • Tobacco comments:     no passive smoke exposure   Vaping Use   • Vaping Use: Never used   Substance and Sexual Activity   • Alcohol use: No   • Drug use: No   • Sexual activity: Not Currently     Partners: Male     Birth control/protection: None     Comment:    Other Topics Concern   • None   Social History Narrative        2 children     Social Determinants of Health     Financial Resource Strain: Low Risk  (7/17/2023)    Overall Financial Resource Strain (CARDIA)    • Difficulty of Paying Living Expenses: Not hard at all   Food Insecurity: No Food Insecurity (1/20/2021)    Hunger Vital Sign    • Worried About Running Out of Food in the Last Year: Never true    • Ran Out of Food in the Last Year: Never true   Transportation Needs: No Transportation Needs (7/17/2023)    PRAPARE - Transportation    • Lack of Transportation (Medical): No    • Lack of Transportation (Non-Medical): No   Physical Activity: Sufficiently Active (1/20/2021)    Exercise Vital Sign    • Days of Exercise per Week: 7 days    • Minutes of Exercise per Session: 40 min   Stress: No Stress Concern Present (1/20/2021)    [facility]    • Feeling of Stress : Not at all   Social Connections:  Moderately Integrated (1/20/2021)    Social Connection and Isolation Panel [NHANES]    • Frequency of Communication with Friends and Family: More than three times a week    • Frequency of Social Gatherings with Friends and Family: More than three times a week    • Attends Sikh Services: 1 to 4 times per year    • Active Member of Clubs or Organizations: No    • Attends Club or Organization Meetings: Never    • Marital Status:    Intimate Partner Violence: Not At Risk (1/20/2021)    Humiliation, Afraid, Rape, and Kick questionnaire    • Fear of Current or Ex-Partner: No    • Emotionally Abused: No    • Physically Abused: No    • Sexually Abused: No   Housing Stability: Not on file      Medications and Allergies:     Current Outpatient Medications   Medication Sig Dispense Refill   • amLODIPine (NORVASC) 5 mg tablet Take 1 tablet (5 mg total) by mouth daily 90 tablet 1   • B Complex Vitamins (VITAMIN-B COMPLEX PO) Take 1 tablet by mouth     • Betaine HCl 300 MG TABS Take 2 tablets (600 mg total) by mouth daily as needed (indigestion) 30 tablet 0   • levothyroxine (Synthroid) 50 mcg tablet Take 1 tablet (50 mcg total) by mouth daily in the early morning TAKE 1 TABLET BY MOUTH DAILY ON  AN EMPTY STOMACH EXCEPT ON  SATURDAY AND SUNDAY TAKE 2  TABLETS 117 tablet 1   • metroNIDAZOLE (METROGEL) 0.75 % gel Apply topically 2 (two) times a day 135 g 1   • naproxen (NAPROSYN) 375 mg tablet As needed for pain      • pancrelipase, Lip-Prot-Amyl, (CREON) 24,000 units Take 24,000 units of lipase by mouth daily as needed for snacks 30 capsule 0   • phenazopyridine (PYRIDIUM) 200 mg tablet Take 1 tablet (200 mg total) by mouth 3 (three) times a day as needed for bladder spasms 30 tablet 0   • triamcinolone (KENALOG) 0.1 % cream Apply topically 2 (two) times a day as needed     • zolpidem (AMBIEN) 5 mg tablet Take 1 tablet (5 mg total) by mouth daily 30 tablet 1   • predniSONE 10 mg tablet  (Patient not taking: Reported on 3/13/2023)       No current facility-administered medications for this visit. Allergies   Allergen Reactions   • No Active Allergies       Immunizations:     Immunization History   Administered Date(s) Administered   • COVID-19 MODERNA VACC 0.5 ML IM 01/25/2021, 02/22/2021   • Zoster 11/06/2014      Health Maintenance: There are no preventive care reminders to display for this patient. Topic Date Due   • Pneumococcal Vaccine: 65+ Years (1 - PCV) Never done   • COVID-19 Vaccine (3 - Moderna series) 04/19/2021   • Influenza Vaccine (1) 09/01/2023      Medicare Screening Tests and Risk Assessments:     Shane Newton is here for her Subsequent Wellness visit. Last Medicare Wellness visit information reviewed, patient interviewed and updates made to the record today. Health Risk Assessment:   Patient rates overall health as good. Patient feels that their physical health rating is same. Patient is satisfied with their life. Eyesight was rated as slightly worse. Hearing was rated as slightly worse. Patient feels that their emotional and mental health rating is same. Patients states they are sometimes angry. Patient states they are sometimes unusually tired/fatigued. Pain experienced in the last 7 days has been some. Patient's pain rating has been 2/10. Patient states that she has experienced no weight loss or gain in last 6 months. Depression Screening:   PHQ-2 Score: 1      Fall Risk Screening:    In the past year, patient has experienced: no history of falling in past year      Urinary Incontinence Screening:   Patient has leaked urine accidently in the last six months. Home Safety:  Patient does not have trouble with stairs inside or outside of their home. Patient has working smoke alarms and has working carbon monoxide detector. Home safety hazards include: none. Nutrition:   Current diet is Regular. Medications:   Patient is currently taking over-the-counter supplements. OTC medications include: see medication list. Patient is able to manage medications. Activities of Daily Living (ADLs)/Instrumental Activities of Daily Living (IADLs):   Walk and transfer into and out of bed and chair?: Yes  Dress and groom yourself?: Yes    Bathe or shower yourself?: Yes    Feed yourself? Yes  Do your laundry/housekeeping?: Yes  Manage your money, pay your bills and track your expenses?: Yes  Make your own meals?: Yes    Do your own shopping?: Yes    Previous Hospitalizations:   Any hospitalizations or ED visits within the last 12 months?: No      Advance Care Planning:   Living will: Yes    Advanced directive: Yes      PREVENTIVE SCREENINGS      Cardiovascular Screening:    General: Screening Not Indicated and History Lipid Disorder      Diabetes Screening:     General: Screening Not Indicated and History Diabetes      Breast Cancer Screening:     General: Screening Current      Cervical Cancer Screening:    General: Screening Not Indicated      Lung Cancer Screening:     General: Screening Not Indicated    Screening, Brief Intervention, and Referral to Treatment (SBIRT)    Screening  Typical number of drinks in a day: 0  Typical number of drinks in a week: 0  Interpretation: Low risk drinking behavior. Other Counseling Topics:   Car/seat belt/driving safety, skin self-exam, sunscreen and calcium and vitamin D intake and regular weightbearing exercise. No results found.      Physical Exam:     /80   Pulse 63   Temp 98.6 °F (37 °C) (Temporal)   Ht 5' 2" (1.575 m)   Wt 62.1 kg (137 lb)   SpO2 99%   BMI 25.06 kg/m²     Physical Exam  Vitals and nursing note reviewed. Constitutional:       General: She is not in acute distress. Appearance: She is well-developed. HENT:      Head: Normocephalic and atraumatic. Right Ear: Tympanic membrane normal.      Left Ear: Tympanic membrane normal.      Mouth/Throat:      Mouth: Mucous membranes are moist.   Eyes:      Conjunctiva/sclera: Conjunctivae normal.   Cardiovascular:      Rate and Rhythm: Normal rate and regular rhythm. Heart sounds: No murmur heard. Pulmonary:      Effort: Pulmonary effort is normal. No respiratory distress. Breath sounds: Normal breath sounds. Abdominal:      General: Abdomen is flat. Palpations: Abdomen is soft. Tenderness: There is no abdominal tenderness. Musculoskeletal:         General: No swelling. Cervical back: Normal range of motion and neck supple. Skin:     General: Skin is warm and dry. Capillary Refill: Capillary refill takes less than 2 seconds. Neurological:      General: No focal deficit present. Mental Status: She is alert. Gait: Gait abnormal.   Psychiatric:         Mood and Affect: Mood normal.      BMI Counseling: Body mass index is 25.06 kg/m². The BMI is above normal. Nutrition recommendations include reducing portion sizes.     Mert Quiroz MD

## 2023-07-21 DIAGNOSIS — E03.9 HYPOTHYROIDISM, UNSPECIFIED TYPE: ICD-10-CM

## 2023-07-21 DIAGNOSIS — G47.00 INSOMNIA, UNSPECIFIED TYPE: ICD-10-CM

## 2023-07-21 RX ORDER — LEVOTHYROXINE SODIUM 0.05 MG/1
50 TABLET ORAL
Qty: 90 TABLET | Refills: 1 | Status: SHIPPED | OUTPATIENT
Start: 2023-07-21 | End: 2023-10-19

## 2023-07-21 RX ORDER — ZOLPIDEM TARTRATE 5 MG/1
5 TABLET ORAL DAILY
Qty: 30 TABLET | Refills: 0 | Status: SHIPPED | OUTPATIENT
Start: 2023-07-21 | End: 2023-08-20

## 2023-08-04 ENCOUNTER — HOSPITAL ENCOUNTER (OUTPATIENT)
Dept: MRI IMAGING | Facility: HOSPITAL | Age: 83
End: 2023-08-04
Payer: MEDICARE

## 2023-08-04 DIAGNOSIS — D42.9: ICD-10-CM

## 2023-08-04 PROCEDURE — 70553 MRI BRAIN STEM W/O & W/DYE: CPT

## 2023-08-04 PROCEDURE — A9585 GADOBUTROL INJECTION: HCPCS | Performed by: RADIOLOGY

## 2023-08-04 PROCEDURE — G1004 CDSM NDSC: HCPCS

## 2023-08-04 RX ORDER — GADOBUTROL 604.72 MG/ML
6 INJECTION INTRAVENOUS
Status: COMPLETED | OUTPATIENT
Start: 2023-08-04 | End: 2023-08-04

## 2023-08-04 RX ADMIN — GADOBUTROL 6 ML: 604.72 INJECTION INTRAVENOUS at 13:19

## 2023-09-25 DIAGNOSIS — G47.00 INSOMNIA, UNSPECIFIED TYPE: ICD-10-CM

## 2023-09-25 DIAGNOSIS — I10 ESSENTIAL HYPERTENSION: ICD-10-CM

## 2023-09-26 RX ORDER — AMLODIPINE BESYLATE 5 MG/1
5 TABLET ORAL DAILY
Qty: 90 TABLET | Refills: 3 | Status: SHIPPED | OUTPATIENT
Start: 2023-09-26

## 2023-09-26 RX ORDER — ZOLPIDEM TARTRATE 5 MG/1
5 TABLET ORAL DAILY
Qty: 30 TABLET | Refills: 0 | Status: SHIPPED | OUTPATIENT
Start: 2023-09-26

## 2023-10-23 DIAGNOSIS — E03.9 HYPOTHYROIDISM, UNSPECIFIED TYPE: ICD-10-CM

## 2023-10-23 DIAGNOSIS — G47.00 INSOMNIA, UNSPECIFIED TYPE: ICD-10-CM

## 2023-10-23 RX ORDER — LEVOTHYROXINE SODIUM 0.05 MG/1
50 TABLET ORAL
Qty: 90 TABLET | Refills: 2 | Status: SHIPPED | OUTPATIENT
Start: 2023-10-23 | End: 2024-01-21

## 2023-10-23 RX ORDER — ZOLPIDEM TARTRATE 5 MG/1
5 TABLET ORAL DAILY
Qty: 30 TABLET | Refills: 0 | Status: SHIPPED | OUTPATIENT
Start: 2023-10-23

## 2023-10-30 ENCOUNTER — HOSPITAL ENCOUNTER (OUTPATIENT)
Dept: MAMMOGRAPHY | Facility: CLINIC | Age: 83
Discharge: HOME | End: 2023-10-30

## 2023-10-30 VITALS — HEIGHT: 62 IN | WEIGHT: 137 LBS | BODY MASS INDEX: 25.21 KG/M2

## 2023-10-30 DIAGNOSIS — N64.4 BREAST PAIN, RIGHT: ICD-10-CM

## 2023-10-30 DIAGNOSIS — Z12.31 ENCOUNTER FOR SCREENING MAMMOGRAM FOR MALIGNANT NEOPLASM OF BREAST: ICD-10-CM

## 2023-11-02 ENCOUNTER — HOSPITAL ENCOUNTER (OUTPATIENT)
Dept: MAMMOGRAPHY | Facility: CLINIC | Age: 83
End: 2023-11-02
Payer: MEDICARE

## 2023-11-02 VITALS — WEIGHT: 137 LBS | HEIGHT: 62 IN | BODY MASS INDEX: 25.21 KG/M2

## 2023-11-02 DIAGNOSIS — N64.4 BREAST PAIN, RIGHT: ICD-10-CM

## 2023-11-02 PROCEDURE — G0279 TOMOSYNTHESIS, MAMMO: HCPCS

## 2023-11-02 PROCEDURE — 76642 ULTRASOUND BREAST LIMITED: CPT

## 2023-11-02 PROCEDURE — 77066 DX MAMMO INCL CAD BI: CPT

## 2023-11-22 DIAGNOSIS — G47.00 INSOMNIA, UNSPECIFIED TYPE: ICD-10-CM

## 2023-11-22 RX ORDER — ZOLPIDEM TARTRATE 5 MG/1
5 TABLET ORAL DAILY
Qty: 30 TABLET | Refills: 1 | Status: SHIPPED | OUTPATIENT
Start: 2023-11-22 | End: 2023-12-22

## 2023-11-29 DIAGNOSIS — G47.00 INSOMNIA, UNSPECIFIED TYPE: ICD-10-CM

## 2023-11-29 NOTE — TELEPHONE ENCOUNTER
Pt's  calling for her refill on zolpidem, I let him know this was already sent in to [facility] on 11/22.  He is going to check with them on status

## 2024-01-08 ENCOUNTER — TELEPHONE (OUTPATIENT)
Dept: INTERNAL MEDICINE CLINIC | Facility: CLINIC | Age: 84
End: 2024-01-08

## 2024-01-08 DIAGNOSIS — N39.0 URINARY TRACT INFECTION WITHOUT HEMATURIA, SITE UNSPECIFIED: ICD-10-CM

## 2024-01-08 DIAGNOSIS — E03.9 ACQUIRED HYPOTHYROIDISM: Primary | ICD-10-CM

## 2024-01-12 ENCOUNTER — APPOINTMENT (OUTPATIENT)
Dept: LAB | Facility: CLINIC | Age: 84
End: 2024-01-12
Payer: MEDICARE

## 2024-01-12 DIAGNOSIS — N39.0 URINARY TRACT INFECTION WITHOUT HEMATURIA, SITE UNSPECIFIED: ICD-10-CM

## 2024-01-12 DIAGNOSIS — E78.2 MIXED HYPERLIPIDEMIA: ICD-10-CM

## 2024-01-12 DIAGNOSIS — E03.9 ACQUIRED HYPOTHYROIDISM: ICD-10-CM

## 2024-01-12 DIAGNOSIS — I10 ESSENTIAL HYPERTENSION, MALIGNANT: ICD-10-CM

## 2024-01-12 DIAGNOSIS — R73.01 IMPAIRED FASTING BLOOD SUGAR: ICD-10-CM

## 2024-01-12 LAB
ALBUMIN SERPL BCP-MCNC: 3.9 G/DL (ref 3.5–5)
ALP SERPL-CCNC: 56 U/L (ref 34–104)
ALT SERPL W P-5'-P-CCNC: 20 U/L (ref 7–52)
ANION GAP SERPL CALCULATED.3IONS-SCNC: 6 MMOL/L
AST SERPL W P-5'-P-CCNC: 19 U/L (ref 13–39)
BACTERIA UR QL AUTO: ABNORMAL /HPF
BASOPHILS # BLD AUTO: 0.07 THOUSANDS/ÂΜL (ref 0–0.1)
BASOPHILS NFR BLD AUTO: 1 % (ref 0–1)
BILIRUB SERPL-MCNC: 0.37 MG/DL (ref 0.2–1)
BILIRUB UR QL STRIP: NEGATIVE
BUN SERPL-MCNC: 16 MG/DL (ref 5–25)
CALCIUM SERPL-MCNC: 9 MG/DL (ref 8.4–10.2)
CHLORIDE SERPL-SCNC: 104 MMOL/L (ref 96–108)
CHOLEST SERPL-MCNC: 175 MG/DL
CLARITY UR: CLEAR
CO2 SERPL-SCNC: 26 MMOL/L (ref 21–32)
COLOR UR: ABNORMAL
CREAT SERPL-MCNC: 0.72 MG/DL (ref 0.6–1.3)
EOSINOPHIL # BLD AUTO: 0.23 THOUSAND/ÂΜL (ref 0–0.61)
EOSINOPHIL NFR BLD AUTO: 3 % (ref 0–6)
ERYTHROCYTE [DISTWIDTH] IN BLOOD BY AUTOMATED COUNT: 13.9 % (ref 11.6–15.1)
GFR SERPL CREATININE-BSD FRML MDRD: 78 ML/MIN/1.73SQ M
GLUCOSE P FAST SERPL-MCNC: 87 MG/DL (ref 65–99)
GLUCOSE UR STRIP-MCNC: NEGATIVE MG/DL
HCT VFR BLD AUTO: 38.3 % (ref 34.8–46.1)
HDLC SERPL-MCNC: 46 MG/DL
HGB BLD-MCNC: 12.4 G/DL (ref 11.5–15.4)
HGB UR QL STRIP.AUTO: NEGATIVE
IMM GRANULOCYTES # BLD AUTO: 0.03 THOUSAND/UL (ref 0–0.2)
IMM GRANULOCYTES NFR BLD AUTO: 0 % (ref 0–2)
KETONES UR STRIP-MCNC: NEGATIVE MG/DL
LDLC SERPL CALC-MCNC: 96 MG/DL (ref 0–100)
LEUKOCYTE ESTERASE UR QL STRIP: ABNORMAL
LYMPHOCYTES # BLD AUTO: 3.15 THOUSANDS/ÂΜL (ref 0.6–4.47)
LYMPHOCYTES NFR BLD AUTO: 45 % (ref 14–44)
MCH RBC QN AUTO: 29.2 PG (ref 26.8–34.3)
MCHC RBC AUTO-ENTMCNC: 32.4 G/DL (ref 31.4–37.4)
MCV RBC AUTO: 90 FL (ref 82–98)
MONOCYTES # BLD AUTO: 0.62 THOUSAND/ÂΜL (ref 0.17–1.22)
MONOCYTES NFR BLD AUTO: 9 % (ref 4–12)
NEUTROPHILS # BLD AUTO: 2.91 THOUSANDS/ÂΜL (ref 1.85–7.62)
NEUTS SEG NFR BLD AUTO: 42 % (ref 43–75)
NITRITE UR QL STRIP: POSITIVE
NON-SQ EPI CELLS URNS QL MICRO: ABNORMAL /HPF
NONHDLC SERPL-MCNC: 129 MG/DL
NRBC BLD AUTO-RTO: 0 /100 WBCS
PH UR STRIP.AUTO: 6 [PH]
PLATELET # BLD AUTO: 318 THOUSANDS/UL (ref 149–390)
PMV BLD AUTO: 9.2 FL (ref 8.9–12.7)
POTASSIUM SERPL-SCNC: 4 MMOL/L (ref 3.5–5.3)
PROT SERPL-MCNC: 7 G/DL (ref 6.4–8.4)
PROT UR STRIP-MCNC: NEGATIVE MG/DL
RBC # BLD AUTO: 4.25 MILLION/UL (ref 3.81–5.12)
RBC #/AREA URNS AUTO: ABNORMAL /HPF
SODIUM SERPL-SCNC: 136 MMOL/L (ref 135–147)
SP GR UR STRIP.AUTO: 1.01 (ref 1–1.03)
T4 FREE SERPL-MCNC: 1.25 NG/DL (ref 0.61–1.12)
TRIGL SERPL-MCNC: 166 MG/DL
TSH SERPL DL<=0.05 MIU/L-ACNC: 2.11 UIU/ML (ref 0.45–4.5)
UROBILINOGEN UR STRIP-ACNC: <2 MG/DL
WBC # BLD AUTO: 7.01 THOUSAND/UL (ref 4.31–10.16)
WBC #/AREA URNS AUTO: ABNORMAL /HPF

## 2024-01-12 PROCEDURE — 83036 HEMOGLOBIN GLYCOSYLATED A1C: CPT

## 2024-01-12 PROCEDURE — 80053 COMPREHEN METABOLIC PANEL: CPT

## 2024-01-12 PROCEDURE — 87086 URINE CULTURE/COLONY COUNT: CPT

## 2024-01-12 PROCEDURE — 84443 ASSAY THYROID STIM HORMONE: CPT

## 2024-01-12 PROCEDURE — 87077 CULTURE AEROBIC IDENTIFY: CPT

## 2024-01-12 PROCEDURE — 80061 LIPID PANEL: CPT

## 2024-01-12 PROCEDURE — 36415 COLL VENOUS BLD VENIPUNCTURE: CPT

## 2024-01-12 PROCEDURE — 85025 COMPLETE CBC W/AUTO DIFF WBC: CPT

## 2024-01-12 PROCEDURE — 84439 ASSAY OF FREE THYROXINE: CPT

## 2024-01-12 PROCEDURE — 87186 SC STD MICRODIL/AGAR DIL: CPT

## 2024-01-13 LAB
EST. AVERAGE GLUCOSE BLD GHB EST-MCNC: 131 MG/DL
HBA1C MFR BLD: 6.2 %

## 2024-01-14 LAB — BACTERIA UR CULT: ABNORMAL

## 2024-01-15 ENCOUNTER — RA CDI HCC (OUTPATIENT)
Dept: OTHER | Facility: HOSPITAL | Age: 84
End: 2024-01-15

## 2024-01-17 ENCOUNTER — VBI (OUTPATIENT)
Dept: ADMINISTRATIVE | Facility: OTHER | Age: 84
End: 2024-01-17

## 2024-01-17 NOTE — TELEPHONE ENCOUNTER
01/17/24 10:11 AM    Patient contacted (left message) to bring Advance Directive, POLST, or Living Will document to next scheduled pcp visit.    Thank you.  Brooke Lakhani [facility]

## 2024-02-02 ENCOUNTER — OFFICE VISIT (OUTPATIENT)
Dept: INTERNAL MEDICINE CLINIC | Facility: CLINIC | Age: 84
End: 2024-02-02
Payer: MEDICARE

## 2024-02-02 VITALS
WEIGHT: 137 LBS | HEART RATE: 60 BPM | TEMPERATURE: 98 F | DIASTOLIC BLOOD PRESSURE: 84 MMHG | BODY MASS INDEX: 25.21 KG/M2 | HEIGHT: 62 IN | OXYGEN SATURATION: 98 % | SYSTOLIC BLOOD PRESSURE: 122 MMHG

## 2024-02-02 DIAGNOSIS — R73.01 IMPAIRED FASTING BLOOD SUGAR: ICD-10-CM

## 2024-02-02 DIAGNOSIS — D42.9 MENINGIOMA, MULTIPLE (HCC): ICD-10-CM

## 2024-02-02 DIAGNOSIS — I10 ESSENTIAL HYPERTENSION: ICD-10-CM

## 2024-02-02 DIAGNOSIS — E03.9 ACQUIRED HYPOTHYROIDISM: Primary | ICD-10-CM

## 2024-02-02 DIAGNOSIS — G47.00 INSOMNIA, UNSPECIFIED TYPE: ICD-10-CM

## 2024-02-02 PROCEDURE — 99214 OFFICE O/P EST MOD 30 MIN: CPT | Performed by: INTERNAL MEDICINE

## 2024-02-02 RX ORDER — ZOLPIDEM TARTRATE 5 MG/1
5 TABLET ORAL DAILY
Qty: 30 TABLET | Refills: 1 | Status: SHIPPED | OUTPATIENT
Start: 2024-02-02

## 2024-02-02 NOTE — PROGRESS NOTES
Assessment/Plan:           1. Acquired hypothyroidism  Comments:  Stable continue levothyroxine 50 mcg's daily.  Orders:  -     CBC and differential; Future; Expected date: 07/01/2024  -     Comprehensive metabolic panel; Future; Expected date: 07/01/2024  -     T4, free; Future; Expected date: 07/01/2024  -     TSH, 3rd generation; Future; Expected date: 07/01/2024  -     Urinalysis with microscopic; Future; Expected date: 07/01/2024    2. Essential hypertension  Comments:  Stable continue amlodipine.    3. Insomnia, unspecified type  Comments:  As needed zolpidem.  Advised limiting benzodiazepines  Orders:  -     zolpidem (AMBIEN) 5 mg tablet; Take 1 tablet (5 mg total) by mouth daily    4. Meningioma, multiple (HCC)    5. Impaired fasting blood sugar  Comments:  Diabetic diet advised.  Continue to monitor.  Orders:  -     Hemoglobin A1C; Future           There are no diagnoses linked to this encounter.     No problem-specific Assessment & Plan notes found for this encounter.           Subjective:      Patient ID: Ramona Webb is a 84 y.o. female.    HPI    The following portions of the patient's history were reviewed and updated as appropriate: She  has a past medical history of Disease of thyroid gland, GERD (gastroesophageal reflux disease), and Shoulder impingement.  She   Patient Active Problem List    Diagnosis Date Noted    Prediabetes 07/22/2021    Cold hands 01/20/2021    Acquired hypothyroidism 01/20/2021    Other insomnia 01/20/2021    Rosacea 01/20/2021    Chronic gastritis 11/19/2020    Diverticular disease of colon 11/19/2020    Hemorrhoids 11/19/2020    Meningioma, multiple (HCC) 12/12/2017    Essential hypertension 10/21/2017    Helicobacter pylori gastritis 10/19/2017    Sinus bradycardia 12/01/2014     She  has a past surgical history that includes Femur Surgery (Right); Appendectomy (1993); Oophorectomy; Hysterectomy (1993); and Breast biopsy (Left).  Her family history includes Breast  cancer in her family; Breast cancer (age of onset: 39) in her sister; Colon cancer in her family; Colon cancer (age of onset: 86) in her mother; Hypertension in her family and father; No Known Problems in her maternal aunt, maternal aunt, maternal aunt, and paternal aunt; Pancreatic cancer in her sister and sister; Stroke in her family and father.  She  reports that she has never smoked. She has never used smokeless tobacco. She reports that she does not drink alcohol and does not use drugs.  Current Outpatient Medications   Medication Sig Dispense Refill    amLODIPine (NORVASC) 5 mg tablet TAKE 1 TABLET BY MOUTH DAILY 90 tablet 3    B Complex Vitamins (VITAMIN-B COMPLEX PO) Take 1 tablet by mouth      Betaine HCl 300 MG TABS Take 2 tablets (600 mg total) by mouth daily as needed (indigestion) 30 tablet 0    levothyroxine (Synthroid) 50 mcg tablet Take 1 tablet (50 mcg total) by mouth daily in the early morning TAKE 1 TABLET BY MOUTH DAILY ON  AN EMPTY STOMACH EXCEPT ON  SATURDAY AND SUNDAY TAKE 2  TABLETS 90 tablet 2    metroNIDAZOLE (METROGEL) 0.75 % gel Apply topically 2 (two) times a day 135 g 1    naproxen (NAPROSYN) 375 mg tablet As needed for pain       pancrelipase, Lip-Prot-Amyl, (CREON) 24,000 units Take 24,000 units of lipase by mouth daily as needed for snacks 30 capsule 0    phenazopyridine (PYRIDIUM) 200 mg tablet Take 1 tablet (200 mg total) by mouth 3 (three) times a day as needed for bladder spasms 30 tablet 0    predniSONE 10 mg tablet       triamcinolone (KENALOG) 0.1 % cream Apply topically 2 (two) times a day as needed      zolpidem (AMBIEN) 5 mg tablet Take 1 tablet (5 mg total) by mouth daily 30 tablet 1     No current facility-administered medications for this visit.     Current Outpatient Medications on File Prior to Visit   Medication Sig    amLODIPine (NORVASC) 5 mg tablet TAKE 1 TABLET BY MOUTH DAILY    B Complex Vitamins (VITAMIN-B COMPLEX PO) Take 1 tablet by mouth    Betaine HCl 300 MG  TABS Take 2 tablets (600 mg total) by mouth daily as needed (indigestion)    levothyroxine (Synthroid) 50 mcg tablet Take 1 tablet (50 mcg total) by mouth daily in the early morning TAKE 1 TABLET BY MOUTH DAILY ON  AN EMPTY STOMACH EXCEPT ON  SATURDAY AND SUNDAY TAKE 2  TABLETS    metroNIDAZOLE (METROGEL) 0.75 % gel Apply topically 2 (two) times a day    naproxen (NAPROSYN) 375 mg tablet As needed for pain     pancrelipase, Lip-Prot-Amyl, (CREON) 24,000 units Take 24,000 units of lipase by mouth daily as needed for snacks    phenazopyridine (PYRIDIUM) 200 mg tablet Take 1 tablet (200 mg total) by mouth 3 (three) times a day as needed for bladder spasms    predniSONE 10 mg tablet     triamcinolone (KENALOG) 0.1 % cream Apply topically 2 (two) times a day as needed    [DISCONTINUED] zolpidem (AMBIEN) 5 mg tablet Take 1 tablet (5 mg total) by mouth daily     No current facility-administered medications on file prior to visit.     Medications Discontinued During This Encounter   Medication Reason    zolpidem (AMBIEN) 5 mg tablet Reorder      She is allergic to no active allergies..    Review of Systems   Constitutional:  Negative for appetite change, chills, fatigue and fever.   HENT:  Negative for sore throat and trouble swallowing.    Eyes:  Negative for redness.   Respiratory:  Negative for shortness of breath.    Cardiovascular:  Negative for chest pain and palpitations.   Gastrointestinal:  Negative for abdominal pain, constipation and diarrhea.   Genitourinary:  Negative for dysuria and hematuria.   Musculoskeletal:  Negative for back pain and neck pain.   Skin:  Negative for rash.   Neurological:  Negative for seizures, weakness and headaches.   Hematological:  Negative for adenopathy.   Psychiatric/Behavioral:  Negative for confusion. The patient is not nervous/anxious.          Objective:      /84 (BP Location: Left arm, Patient Position: Sitting, Cuff Size: Standard)   Pulse 60   Temp 98 °F (36.7 °C)  "(Temporal)   Ht 5' 2\" (1.575 m)   Wt 62.1 kg (137 lb)   SpO2 98%   BMI 25.06 kg/m²     Results Reviewed       None            Recent Results (from the past 1344 hour(s))   Urinalysis with microscopic    Collection Time: 01/12/24  7:06 AM   Result Value Ref Range    Color, UA Light Yellow     Clarity, UA Clear     Specific Gravity, UA 1.012 1.003 - 1.030    pH, UA 6.0 4.5, 5.0, 5.5, 6.0, 6.5, 7.0, 7.5, 8.0    Leukocytes, UA Moderate (A) Negative    Nitrite, UA Positive (A) Negative    Protein, UA Negative Negative mg/dl    Glucose, UA Negative Negative mg/dl    Ketones, UA Negative Negative mg/dl    Urobilinogen, UA <2.0 <2.0 mg/dl mg/dl    Bilirubin, UA Negative Negative    Occult Blood, UA Negative Negative    RBC, UA 1-2 None Seen, 1-2 /hpf    WBC, UA 10-20 (A) None Seen, 1-2 /hpf    Epithelial Cells None Seen None Seen, Occasional /hpf    Bacteria, UA Occasional None Seen, Occasional /hpf   CBC and differential    Collection Time: 01/12/24  7:06 AM   Result Value Ref Range    WBC 7.01 4.31 - 10.16 Thousand/uL    RBC 4.25 3.81 - 5.12 Million/uL    Hemoglobin 12.4 11.5 - 15.4 g/dL    Hematocrit 38.3 34.8 - 46.1 %    MCV 90 82 - 98 fL    MCH 29.2 26.8 - 34.3 pg    MCHC 32.4 31.4 - 37.4 g/dL    RDW 13.9 11.6 - 15.1 %    MPV 9.2 8.9 - 12.7 fL    Platelets 318 149 - 390 Thousands/uL    nRBC 0 /100 WBCs    Neutrophils Relative 42 (L) 43 - 75 %    Immat GRANS % 0 0 - 2 %    Lymphocytes Relative 45 (H) 14 - 44 %    Monocytes Relative 9 4 - 12 %    Eosinophils Relative 3 0 - 6 %    Basophils Relative 1 0 - 1 %    Neutrophils Absolute 2.91 1.85 - 7.62 Thousands/µL    Immature Grans Absolute 0.03 0.00 - 0.20 Thousand/uL    Lymphocytes Absolute 3.15 0.60 - 4.47 Thousands/µL    Monocytes Absolute 0.62 0.17 - 1.22 Thousand/µL    Eosinophils Absolute 0.23 0.00 - 0.61 Thousand/µL    Basophils Absolute 0.07 0.00 - 0.10 Thousands/µL   Comprehensive metabolic panel    Collection Time: 01/12/24  7:06 AM   Result Value Ref Range "    Sodium 136 135 - 147 mmol/L    Potassium 4.0 3.5 - 5.3 mmol/L    Chloride 104 96 - 108 mmol/L    CO2 26 21 - 32 mmol/L    ANION GAP 6 mmol/L    BUN 16 5 - 25 mg/dL    Creatinine 0.72 0.60 - 1.30 mg/dL    Glucose, Fasting 87 65 - 99 mg/dL    Calcium 9.0 8.4 - 10.2 mg/dL    AST 19 13 - 39 U/L    ALT 20 7 - 52 U/L    Alkaline Phosphatase 56 34 - 104 U/L    Total Protein 7.0 6.4 - 8.4 g/dL    Albumin 3.9 3.5 - 5.0 g/dL    Total Bilirubin 0.37 0.20 - 1.00 mg/dL    eGFR 78 ml/min/1.73sq m   Hemoglobin A1C    Collection Time: 01/12/24  7:06 AM   Result Value Ref Range    Hemoglobin A1C 6.2 (H) Normal 4.0-5.6%; PreDiabetic 5.7-6.4%; Diabetic >=6.5%; Glycemic control for adults with diabetes <7.0% %     mg/dl   Lipid panel    Collection Time: 01/12/24  7:06 AM   Result Value Ref Range    Cholesterol 175 See Comment mg/dL    Triglycerides 166 (H) See Comment mg/dL    HDL, Direct 46 (L) >=50 mg/dL    LDL Calculated 96 0 - 100 mg/dL    Non-HDL-Chol (CHOL-HDL) 129 mg/dl   Urine culture    Collection Time: 01/12/24  7:06 AM    Specimen: Urine   Result Value Ref Range    Urine Culture >100,000 cfu/ml Escherichia coli (A)        Susceptibility    Escherichia coli - KAMERON     ZID Performed Yes       Ampicillin ($$) <=8.00 Susceptible ug/ml     Aztreonam ($$$)  <=4 Susceptible ug/ml     Cefazolin ($) <=2.00 Susceptible ug/ml     Ciprofloxacin ($)  >2.00 Resistant ug/ml     Gentamicin ($$) <=2 Susceptible ug/ml     Levofloxacin ($) 4.00 Resistant ug/ml     Nitrofurantoin <=32 Susceptible ug/ml     Tetracycline <=4 Susceptible ug/ml     Tobramycin ($) <=2 Susceptible ug/ml     Trimethoprim + Sulfamethoxazole ($$$) <=0.5/9.5 Susceptible ug/ml   TSH, 3rd generation    Collection Time: 01/12/24  7:06 AM   Result Value Ref Range    TSH 3RD GENERATON 2.111 0.450 - 4.500 uIU/mL   T4, free    Collection Time: 01/12/24  7:06 AM   Result Value Ref Range    Free T4 1.25 (H) 0.61 - 1.12 ng/dL        Physical Exam  Constitutional:        General: She is not in acute distress.     Appearance: Normal appearance.   HENT:      Head: Normocephalic and atraumatic.      Nose: Nose normal.      Mouth/Throat:      Mouth: Mucous membranes are moist.   Eyes:      Extraocular Movements: Extraocular movements intact.      Pupils: Pupils are equal, round, and reactive to light.   Cardiovascular:      Rate and Rhythm: Normal rate and regular rhythm.      Pulses: Normal pulses.      Heart sounds: Normal heart sounds. No murmur heard.     No friction rub.   Pulmonary:      Effort: Pulmonary effort is normal. No respiratory distress.      Breath sounds: Normal breath sounds. No wheezing.   Abdominal:      General: Abdomen is flat. Bowel sounds are normal. There is no distension.      Palpations: Abdomen is soft. There is no mass.      Tenderness: There is no abdominal tenderness. There is no guarding.   Musculoskeletal:         General: Normal range of motion.      Cervical back: Normal range of motion.   Neurological:      General: No focal deficit present.      Mental Status: She is alert and oriented to person, place, and time. Mental status is at baseline.      Cranial Nerves: No cranial nerve deficit.   Psychiatric:         Mood and Affect: Mood normal.         Behavior: Behavior normal.

## 2024-04-23 DIAGNOSIS — E03.9 HYPOTHYROIDISM, UNSPECIFIED TYPE: ICD-10-CM

## 2024-04-23 RX ORDER — LEVOTHYROXINE SODIUM 50 MCG
TABLET ORAL
Qty: 36 TABLET | Refills: 5 | Status: SHIPPED | OUTPATIENT
Start: 2024-04-23

## 2024-05-15 DIAGNOSIS — G47.00 INSOMNIA, UNSPECIFIED TYPE: ICD-10-CM

## 2024-05-15 RX ORDER — ZOLPIDEM TARTRATE 5 MG/1
5 TABLET ORAL DAILY
Qty: 30 TABLET | Refills: 1 | Status: SHIPPED | OUTPATIENT
Start: 2024-05-15 | End: 2024-06-14

## 2024-05-15 NOTE — TELEPHONE ENCOUNTER
Medication: zolpidem (AMBIEN) 5 mg tablet     Dose/Frequency: Take 1 tablet (5 mg total) by mouth daily     Quantity: 30    Pharmacy: [facility] ([facility] - [place], [place]  [address]     Office:   [x] PCP/Provider - Phuc Sampson MD   [] Speciality/Provider -     Does the patient have enough for 3 days?   [x] Yes   [] No - Send as HP to POD

## 2024-06-05 ENCOUNTER — APPOINTMENT (OUTPATIENT)
Dept: LAB | Facility: CLINIC | Age: 84
End: 2024-06-05
Payer: MEDICARE

## 2024-06-05 DIAGNOSIS — E03.9 ACQUIRED HYPOTHYROIDISM: ICD-10-CM

## 2024-06-05 DIAGNOSIS — R73.01 IMPAIRED FASTING BLOOD SUGAR: ICD-10-CM

## 2024-06-05 LAB
ALBUMIN SERPL BCP-MCNC: 4 G/DL (ref 3.5–5)
ALP SERPL-CCNC: 49 U/L (ref 34–104)
ALT SERPL W P-5'-P-CCNC: 13 U/L (ref 7–52)
ANION GAP SERPL CALCULATED.3IONS-SCNC: 7 MMOL/L (ref 4–13)
AST SERPL W P-5'-P-CCNC: 15 U/L (ref 13–39)
BACTERIA UR QL AUTO: ABNORMAL /HPF
BASOPHILS # BLD AUTO: 0.08 THOUSANDS/ÂΜL (ref 0–0.1)
BASOPHILS NFR BLD AUTO: 1 % (ref 0–1)
BILIRUB SERPL-MCNC: 0.33 MG/DL (ref 0.2–1)
BILIRUB UR QL STRIP: NEGATIVE
BUN SERPL-MCNC: 22 MG/DL (ref 5–25)
CALCIUM SERPL-MCNC: 8.9 MG/DL (ref 8.4–10.2)
CHLORIDE SERPL-SCNC: 108 MMOL/L (ref 96–108)
CLARITY UR: CLEAR
CO2 SERPL-SCNC: 24 MMOL/L (ref 21–32)
COLOR UR: ABNORMAL
CREAT SERPL-MCNC: 0.65 MG/DL (ref 0.6–1.3)
EOSINOPHIL # BLD AUTO: 0.13 THOUSAND/ÂΜL (ref 0–0.61)
EOSINOPHIL NFR BLD AUTO: 2 % (ref 0–6)
ERYTHROCYTE [DISTWIDTH] IN BLOOD BY AUTOMATED COUNT: 14 % (ref 11.6–15.1)
EST. AVERAGE GLUCOSE BLD GHB EST-MCNC: 128 MG/DL
GFR SERPL CREATININE-BSD FRML MDRD: 82 ML/MIN/1.73SQ M
GLUCOSE P FAST SERPL-MCNC: 94 MG/DL (ref 65–99)
GLUCOSE UR STRIP-MCNC: NEGATIVE MG/DL
HBA1C MFR BLD: 6.1 %
HCT VFR BLD AUTO: 38.2 % (ref 34.8–46.1)
HGB BLD-MCNC: 12.2 G/DL (ref 11.5–15.4)
HGB UR QL STRIP.AUTO: NEGATIVE
IMM GRANULOCYTES # BLD AUTO: 0.01 THOUSAND/UL (ref 0–0.2)
IMM GRANULOCYTES NFR BLD AUTO: 0 % (ref 0–2)
KETONES UR STRIP-MCNC: NEGATIVE MG/DL
LEUKOCYTE ESTERASE UR QL STRIP: ABNORMAL
LYMPHOCYTES # BLD AUTO: 2.71 THOUSANDS/ÂΜL (ref 0.6–4.47)
LYMPHOCYTES NFR BLD AUTO: 40 % (ref 14–44)
MCH RBC QN AUTO: 28.7 PG (ref 26.8–34.3)
MCHC RBC AUTO-ENTMCNC: 31.9 G/DL (ref 31.4–37.4)
MCV RBC AUTO: 90 FL (ref 82–98)
MONOCYTES # BLD AUTO: 0.53 THOUSAND/ÂΜL (ref 0.17–1.22)
MONOCYTES NFR BLD AUTO: 8 % (ref 4–12)
MUCOUS THREADS UR QL AUTO: ABNORMAL
NEUTROPHILS # BLD AUTO: 3.29 THOUSANDS/ÂΜL (ref 1.85–7.62)
NEUTS SEG NFR BLD AUTO: 49 % (ref 43–75)
NITRITE UR QL STRIP: NEGATIVE
NON-SQ EPI CELLS URNS QL MICRO: ABNORMAL /HPF
NRBC BLD AUTO-RTO: 0 /100 WBCS
PH UR STRIP.AUTO: 6.5 [PH]
PLATELET # BLD AUTO: 244 THOUSANDS/UL (ref 149–390)
PMV BLD AUTO: 9.7 FL (ref 8.9–12.7)
POTASSIUM SERPL-SCNC: 3.8 MMOL/L (ref 3.5–5.3)
PROT SERPL-MCNC: 6.8 G/DL (ref 6.4–8.4)
PROT UR STRIP-MCNC: NEGATIVE MG/DL
RBC # BLD AUTO: 4.25 MILLION/UL (ref 3.81–5.12)
RBC #/AREA URNS AUTO: ABNORMAL /HPF
SODIUM SERPL-SCNC: 139 MMOL/L (ref 135–147)
SP GR UR STRIP.AUTO: 1.01 (ref 1–1.03)
T4 FREE SERPL-MCNC: 1.22 NG/DL (ref 0.61–1.12)
TSH SERPL DL<=0.05 MIU/L-ACNC: 0.34 UIU/ML (ref 0.45–4.5)
UROBILINOGEN UR STRIP-ACNC: <2 MG/DL
WBC # BLD AUTO: 6.75 THOUSAND/UL (ref 4.31–10.16)
WBC #/AREA URNS AUTO: ABNORMAL /HPF

## 2024-06-05 PROCEDURE — 81001 URINALYSIS AUTO W/SCOPE: CPT

## 2024-06-05 PROCEDURE — 80053 COMPREHEN METABOLIC PANEL: CPT

## 2024-06-05 PROCEDURE — 83036 HEMOGLOBIN GLYCOSYLATED A1C: CPT

## 2024-06-05 PROCEDURE — 36415 COLL VENOUS BLD VENIPUNCTURE: CPT

## 2024-06-05 PROCEDURE — 85025 COMPLETE CBC W/AUTO DIFF WBC: CPT

## 2024-06-05 PROCEDURE — 84439 ASSAY OF FREE THYROXINE: CPT

## 2024-06-05 PROCEDURE — 84443 ASSAY THYROID STIM HORMONE: CPT

## 2024-06-05 NOTE — TELEPHONE ENCOUNTER
metroNIDAZOLE (METROGEL) 0.75 % gel   Confirmed refill for 90 days to [facility] was already processed..allow more time.

## 2024-06-17 ENCOUNTER — TELEPHONE (OUTPATIENT)
Dept: INTERNAL MEDICINE CLINIC | Facility: CLINIC | Age: 84
End: 2024-06-17

## 2024-07-01 DIAGNOSIS — E03.9 ACQUIRED HYPOTHYROIDISM: Primary | ICD-10-CM

## 2024-07-12 ENCOUNTER — RA CDI HCC (OUTPATIENT)
Dept: OTHER | Facility: HOSPITAL | Age: 84
End: 2024-07-12

## 2024-07-15 ENCOUNTER — APPOINTMENT (OUTPATIENT)
Dept: LAB | Facility: CLINIC | Age: 84
End: 2024-07-15
Payer: MEDICARE

## 2024-07-15 DIAGNOSIS — E03.9 ACQUIRED HYPOTHYROIDISM: Primary | ICD-10-CM

## 2024-07-15 LAB
T3FREE SERPL-MCNC: 3.03 PG/ML (ref 2.5–3.9)
T4 FREE SERPL-MCNC: 0.88 NG/DL (ref 0.61–1.12)
TSH SERPL DL<=0.05 MIU/L-ACNC: 7.83 UIU/ML (ref 0.45–4.5)

## 2024-07-15 PROCEDURE — 84439 ASSAY OF FREE THYROXINE: CPT

## 2024-07-15 PROCEDURE — 84443 ASSAY THYROID STIM HORMONE: CPT

## 2024-07-17 DIAGNOSIS — I10 ESSENTIAL HYPERTENSION: ICD-10-CM

## 2024-07-18 RX ORDER — AMLODIPINE BESYLATE 5 MG/1
5 TABLET ORAL DAILY
Qty: 100 TABLET | Refills: 1 | Status: SHIPPED | OUTPATIENT
Start: 2024-07-18

## 2024-07-19 ENCOUNTER — OFFICE VISIT (OUTPATIENT)
Dept: INTERNAL MEDICINE CLINIC | Facility: CLINIC | Age: 84
End: 2024-07-19
Payer: MEDICARE

## 2024-07-19 VITALS
TEMPERATURE: 98.6 F | HEIGHT: 62 IN | HEART RATE: 67 BPM | OXYGEN SATURATION: 96 % | BODY MASS INDEX: 24.48 KG/M2 | WEIGHT: 133 LBS | DIASTOLIC BLOOD PRESSURE: 70 MMHG | SYSTOLIC BLOOD PRESSURE: 144 MMHG

## 2024-07-19 DIAGNOSIS — Z23 NEED FOR DIPHTHERIA-TETANUS-PERTUSSIS (TDAP) VACCINE: ICD-10-CM

## 2024-07-19 DIAGNOSIS — E03.9 ACQUIRED HYPOTHYROIDISM: ICD-10-CM

## 2024-07-19 DIAGNOSIS — Z00.00 MEDICARE ANNUAL WELLNESS VISIT, SUBSEQUENT: ICD-10-CM

## 2024-07-19 DIAGNOSIS — Z13.820 ENCOUNTER FOR OSTEOPOROSIS SCREENING IN ASYMPTOMATIC POSTMENOPAUSAL PATIENT: ICD-10-CM

## 2024-07-19 DIAGNOSIS — I10 ESSENTIAL HYPERTENSION: Primary | ICD-10-CM

## 2024-07-19 DIAGNOSIS — R73.01 IMPAIRED FASTING BLOOD SUGAR: ICD-10-CM

## 2024-07-19 DIAGNOSIS — Z78.0 ENCOUNTER FOR OSTEOPOROSIS SCREENING IN ASYMPTOMATIC POSTMENOPAUSAL PATIENT: ICD-10-CM

## 2024-07-19 PROCEDURE — 99214 OFFICE O/P EST MOD 30 MIN: CPT | Performed by: INTERNAL MEDICINE

## 2024-07-19 PROCEDURE — G0439 PPPS, SUBSEQ VISIT: HCPCS | Performed by: INTERNAL MEDICINE

## 2024-07-19 RX ORDER — LISINOPRIL 20 MG/1
20 TABLET ORAL DAILY
COMMUNITY
Start: 2024-06-10

## 2024-07-19 NOTE — PROGRESS NOTES
Ambulatory Visit  Name: Ramona Webb      : 1940      MRN: [id number]  Encounter Provider: Phuc Sampson MD  Encounter Date: 2024   Encounter department: [facility] INTERNAL MEDICINE    Assessment & Plan   1. Essential hypertension  Comments:  Stable continue current regimen.  2. Acquired hypothyroidism  Comments:  Recommend continuing the same dose and rechecking in October.  Orders:  -     T4, free; Future; Expected date: 10/07/2024  -     TSH, 3rd generation; Future; Expected date: 10/07/2024  -     Urinalysis with microscopic; Future; Expected date: 10/07/2024  -     T3, free; Future; Expected date: 10/07/2024  3. Impaired fasting blood sugar  Comments:  Stable continue diabetic diet and continue to monitor.  Orders:  -     CBC and differential; Future; Expected date: 10/07/2024  -     Comprehensive metabolic panel; Future; Expected date: 10/07/2024  -     Hemoglobin A1C; Future; Expected date: 10/07/2024  4. Encounter for osteoporosis screening in asymptomatic postmenopausal patient  -     DXA bone density spine hip and pelvis; Future; Expected date: 2024  5. Need for diphtheria-tetanus-pertussis (Tdap) vaccine  -     tetanus-diphtheria-acellular pertussis (ADACEL) 5-2-15.5 LF-mcg/0.5 injection; Inject 0.5 mL into a muscle once for 1 dose       Preventive health issues were discussed with patient, and age appropriate screening tests were ordered as noted in patient's After Visit Summary. Personalized health advice and appropriate referrals for health education or preventive services given if needed, as noted in patient's After Visit Summary.    History of Present Illness     HPI   Patient Care Team:  Phuc Sampson MD as PCP - General (Internal Medicine)    Review of Systems   Constitutional:  Negative for appetite change, chills, fatigue and fever.   HENT:  Negative for sore throat and trouble swallowing.    Eyes:  Negative for redness.   Respiratory:  Negative for  shortness of breath.    Cardiovascular:  Negative for chest pain and palpitations.   Gastrointestinal:  Negative for abdominal pain, constipation and diarrhea.   Genitourinary:  Negative for dysuria and hematuria.   Musculoskeletal:  Positive for arthralgias and gait problem. Negative for back pain and neck pain.   Skin:  Negative for rash.   Neurological:  Negative for seizures, weakness and headaches.   Hematological:  Negative for adenopathy.   Psychiatric/Behavioral:  Negative for confusion. The patient is not nervous/anxious.      Medical History Reviewed by provider this encounter:  Tobacco  Allergies  Meds  Problems  Med Hx  Surg Hx  Fam Hx       Annual Wellness Visit Questionnaire   Ramona is here for her Subsequent Wellness visit. Last Medicare Wellness visit information reviewed, patient interviewed and updates made to the record today.      Health Risk Assessment:   Patient rates overall health as good. Patient feels that their physical health rating is same. Patient is satisfied with their life. Eyesight was rated as same. Hearing was rated as same. Patient feels that their emotional and mental health rating is same. Patients states they are sometimes angry. Patient states they are sometimes unusually tired/fatigued. Pain experienced in the last 7 days has been some. Patient's pain rating has been 1/10. Patient states that she has experienced no weight loss or gain in last 6 months.     Depression Screening:   PHQ-2 Score: 2      Fall Risk Screening:   In the past year, patient has experienced: no history of falling in past year      Urinary Incontinence Screening:   Patient has leaked urine accidently in the last six months.     Home Safety:  Patient does not have trouble with stairs inside or outside of their home. Patient has working smoke alarms and has working carbon monoxide detector. Home safety hazards include: none.     Nutrition:   Current diet is Regular.     Medications:   Patient is  not currently taking any over-the-counter supplements. Patient is able to manage medications.     Activities of Daily Living (ADLs)/Instrumental Activities of Daily Living (IADLs):   Walk and transfer into and out of bed and chair?: Yes  Dress and groom yourself?: Yes    Bathe or shower yourself?: Yes    Feed yourself? Yes  Do your laundry/housekeeping?: Yes  Manage your money, pay your bills and track your expenses?: Yes  Make your own meals?: Yes    Do your own shopping?: Yes    Previous Hospitalizations:   Any hospitalizations or ED visits within the last 12 months?: No      Advance Care Planning:   Living will: Yes    Durable POA for healthcare: Yes    Advanced directive: Yes      PREVENTIVE SCREENINGS      Cardiovascular Screening:    General: Screening Not Indicated and History Lipid Disorder      Diabetes Screening:     General: Screening Current      Breast Cancer Screening:     General: Screening Current      Cervical Cancer Screening:    General: Screening Not Indicated      Lung Cancer Screening:     General: Screening Not Indicated    Screening, Brief Intervention, and Referral to Treatment (SBIRT)    Screening  Typical number of drinks in a day: 0  Typical number of drinks in a week: 0  Interpretation: Low risk drinking behavior.    Single Item Drug Screening:  How often have you used an illegal drug (including marijuana) or a prescription medication for non-medical reasons in the past year? never    Single Item Drug Screen Score: 0  Interpretation: Negative screen for possible drug use disorder    Other Counseling Topics:   Car/seat belt/driving safety, skin self-exam, sunscreen and calcium and vitamin D intake and regular weightbearing exercise.     Social Determinants of Health     Financial Resource Strain: Low Risk  (7/17/2023)    Overall Financial Resource Strain (CARDIA)     Difficulty of Paying Living Expenses: Not hard at all   Food Insecurity: No Food Insecurity (7/19/2024)    Hunger Vital  "Sign     Worried About Running Out of Food in the Last Year: Never true     Ran Out of Food in the Last Year: Never true   Transportation Needs: No Transportation Needs (7/19/2024)    PRAPARE - Transportation     Lack of Transportation (Medical): No     Lack of Transportation (Non-Medical): No   Housing Stability: Low Risk  (7/19/2024)    Housing Stability Vital Sign     Unable to Pay for Housing in the Last Year: No     Number of Times Moved in the Last Year: 0     Homeless in the Last Year: No   Utilities: Not At Risk (7/19/2024)    [facility] Utilities     Threatened with loss of utilities: No     No results found.    Objective     /70 (BP Location: Left arm, Patient Position: Sitting, Cuff Size: Standard)   Pulse 67   Temp 98.6 °F (37 °C) (Temporal)   Ht 5' 2\" (1.575 m)   Wt 60.3 kg (133 lb)   SpO2 96%   BMI 24.33 kg/m²     Physical Exam  Vitals and nursing note reviewed.   Constitutional:       General: She is not in acute distress.     Appearance: She is well-developed.   HENT:      Head: Normocephalic and atraumatic.      Mouth/Throat:      Mouth: Mucous membranes are moist.   Eyes:      Conjunctiva/sclera: Conjunctivae normal.   Cardiovascular:      Rate and Rhythm: Normal rate and regular rhythm.      Heart sounds: No murmur heard.  Pulmonary:      Effort: Pulmonary effort is normal. No respiratory distress.      Breath sounds: Normal breath sounds.   Abdominal:      Palpations: Abdomen is soft.      Tenderness: There is no abdominal tenderness.   Musculoskeletal:         General: No swelling.      Cervical back: Normal range of motion and neck supple.   Skin:     General: Skin is warm and dry.      Capillary Refill: Capillary refill takes less than 2 seconds.   Neurological:      Mental Status: She is alert.      Gait: Gait abnormal.   Psychiatric:         Mood and Affect: Mood normal.           "

## 2024-08-15 DIAGNOSIS — G47.00 INSOMNIA, UNSPECIFIED TYPE: ICD-10-CM

## 2024-08-15 NOTE — TELEPHONE ENCOUNTER
Medication: Zolpidem    Dose/Frequency: 5mg 1 tab day    Quantity: 30    Pharmacy: [facility]    Office:   [x] PCP/Provider -   [] Speciality/Provider -     Does the patient have enough for 3 days?   [] Yes   [x] No - Send as HP to POD

## 2024-08-20 RX ORDER — ZOLPIDEM TARTRATE 5 MG/1
5 TABLET ORAL DAILY
Qty: 30 TABLET | Refills: 1 | OUTPATIENT
Start: 2024-08-20 | End: 2024-09-19

## 2024-09-09 ENCOUNTER — HOSPITAL ENCOUNTER (OUTPATIENT)
Dept: MRI IMAGING | Facility: HOSPITAL | Age: 84
Discharge: HOME | End: 2024-09-09
Payer: MEDICARE

## 2024-09-09 DIAGNOSIS — D42.9 MENINGIOMA, MULTIPLE (HCC): ICD-10-CM

## 2024-09-09 PROCEDURE — 70553 MRI BRAIN STEM W/O & W/DYE: CPT

## 2024-09-09 PROCEDURE — A9585 GADOBUTROL INJECTION: HCPCS | Performed by: RADIOLOGY

## 2024-09-09 RX ORDER — GADOBUTROL 604.72 MG/ML
6 INJECTION INTRAVENOUS
Status: COMPLETED | OUTPATIENT
Start: 2024-09-09 | End: 2024-09-09

## 2024-09-09 RX ADMIN — GADOBUTROL 6 ML: 604.72 INJECTION INTRAVENOUS at 09:59

## 2024-09-17 DIAGNOSIS — G47.00 INSOMNIA, UNSPECIFIED TYPE: ICD-10-CM

## 2024-09-17 NOTE — TELEPHONE ENCOUNTER
Medication: zolpidem (AMBIEN)     Dose/Frequency: 5mg    Quantity: 30 Tablet    Pharmacy: [facility] - [address]      Office:   [x] PCP/Provider -   [] Speciality/Provider -     Does the patient have enough for 3 days?   [x] Yes   [] No - Send as HP to POD

## 2024-09-20 RX ORDER — ZOLPIDEM TARTRATE 5 MG/1
5 TABLET ORAL DAILY
Qty: 30 TABLET | Refills: 0 | Status: SHIPPED | OUTPATIENT
Start: 2024-09-20

## 2024-10-07 ENCOUNTER — APPOINTMENT (OUTPATIENT)
Dept: LAB | Facility: CLINIC | Age: 84
End: 2024-10-07
Payer: MEDICARE

## 2024-10-07 DIAGNOSIS — R73.01 IMPAIRED FASTING BLOOD SUGAR: ICD-10-CM

## 2024-10-07 DIAGNOSIS — E03.9 ACQUIRED HYPOTHYROIDISM: ICD-10-CM

## 2024-10-07 LAB
ALBUMIN SERPL BCG-MCNC: 3.8 G/DL (ref 3.5–5)
ALP SERPL-CCNC: 42 U/L (ref 34–104)
ALT SERPL W P-5'-P-CCNC: 17 U/L (ref 7–52)
ANION GAP SERPL CALCULATED.3IONS-SCNC: 5 MMOL/L (ref 4–13)
AST SERPL W P-5'-P-CCNC: 20 U/L (ref 13–39)
BACTERIA UR QL AUTO: ABNORMAL /HPF
BASOPHILS # BLD AUTO: 0.07 THOUSANDS/ΜL (ref 0–0.1)
BASOPHILS NFR BLD AUTO: 1 % (ref 0–1)
BILIRUB SERPL-MCNC: 0.36 MG/DL (ref 0.2–1)
BILIRUB UR QL STRIP: NEGATIVE
BUN SERPL-MCNC: 20 MG/DL (ref 5–25)
CALCIUM SERPL-MCNC: 8.8 MG/DL (ref 8.4–10.2)
CHLORIDE SERPL-SCNC: 105 MMOL/L (ref 96–108)
CLARITY UR: ABNORMAL
CO2 SERPL-SCNC: 25 MMOL/L (ref 21–32)
COLOR UR: ABNORMAL
CREAT SERPL-MCNC: 0.76 MG/DL (ref 0.6–1.3)
EOSINOPHIL # BLD AUTO: 0.13 THOUSAND/ΜL (ref 0–0.61)
EOSINOPHIL NFR BLD AUTO: 2 % (ref 0–6)
ERYTHROCYTE [DISTWIDTH] IN BLOOD BY AUTOMATED COUNT: 14.1 % (ref 11.6–15.1)
EST. AVERAGE GLUCOSE BLD GHB EST-MCNC: 126 MG/DL
GFR SERPL CREATININE-BSD FRML MDRD: 73 ML/MIN/1.73SQ M
GLUCOSE P FAST SERPL-MCNC: 92 MG/DL (ref 65–99)
GLUCOSE UR STRIP-MCNC: NEGATIVE MG/DL
HBA1C MFR BLD: 6 %
HCT VFR BLD AUTO: 37 % (ref 34.8–46.1)
HGB BLD-MCNC: 12 G/DL (ref 11.5–15.4)
HGB UR QL STRIP.AUTO: ABNORMAL
IMM GRANULOCYTES # BLD AUTO: 0.01 THOUSAND/UL (ref 0–0.2)
IMM GRANULOCYTES NFR BLD AUTO: 0 % (ref 0–2)
KETONES UR STRIP-MCNC: NEGATIVE MG/DL
LEUKOCYTE ESTERASE UR QL STRIP: ABNORMAL
LYMPHOCYTES # BLD AUTO: 2.83 THOUSANDS/ΜL (ref 0.6–4.47)
LYMPHOCYTES NFR BLD AUTO: 46 % (ref 14–44)
MCH RBC QN AUTO: 29.3 PG (ref 26.8–34.3)
MCHC RBC AUTO-ENTMCNC: 32.4 G/DL (ref 31.4–37.4)
MCV RBC AUTO: 90 FL (ref 82–98)
MONOCYTES # BLD AUTO: 0.52 THOUSAND/ΜL (ref 0.17–1.22)
MONOCYTES NFR BLD AUTO: 8 % (ref 4–12)
MUCOUS THREADS UR QL AUTO: ABNORMAL
NEUTROPHILS # BLD AUTO: 2.73 THOUSANDS/ΜL (ref 1.85–7.62)
NEUTS SEG NFR BLD AUTO: 43 % (ref 43–75)
NITRITE UR QL STRIP: NEGATIVE
NON-SQ EPI CELLS URNS QL MICRO: ABNORMAL /HPF
NRBC BLD AUTO-RTO: 0 /100 WBCS
PH UR STRIP.AUTO: 7 [PH]
PLATELET # BLD AUTO: 242 THOUSANDS/UL (ref 149–390)
PMV BLD AUTO: 9.4 FL (ref 8.9–12.7)
POTASSIUM SERPL-SCNC: 4.1 MMOL/L (ref 3.5–5.3)
PROT SERPL-MCNC: 6.7 G/DL (ref 6.4–8.4)
PROT UR STRIP-MCNC: NEGATIVE MG/DL
RBC # BLD AUTO: 4.1 MILLION/UL (ref 3.81–5.12)
RBC #/AREA URNS AUTO: ABNORMAL /HPF
SODIUM SERPL-SCNC: 135 MMOL/L (ref 135–147)
SP GR UR STRIP.AUTO: 1.01 (ref 1–1.03)
T3FREE SERPL-MCNC: 2.21 PG/ML (ref 2.5–3.9)
T4 FREE SERPL-MCNC: 1.04 NG/DL (ref 0.61–1.12)
TSH SERPL DL<=0.05 MIU/L-ACNC: 5.84 UIU/ML (ref 0.45–4.5)
UROBILINOGEN UR STRIP-ACNC: <2 MG/DL
WBC # BLD AUTO: 6.29 THOUSAND/UL (ref 4.31–10.16)
WBC #/AREA URNS AUTO: ABNORMAL /HPF
WBC CLUMPS # UR AUTO: PRESENT /UL

## 2024-10-07 PROCEDURE — 84481 FREE ASSAY (FT-3): CPT

## 2024-10-07 PROCEDURE — 84439 ASSAY OF FREE THYROXINE: CPT

## 2024-10-07 PROCEDURE — 80053 COMPREHEN METABOLIC PANEL: CPT

## 2024-10-07 PROCEDURE — 84443 ASSAY THYROID STIM HORMONE: CPT

## 2024-10-07 PROCEDURE — 81001 URINALYSIS AUTO W/SCOPE: CPT

## 2024-10-07 PROCEDURE — 83036 HEMOGLOBIN GLYCOSYLATED A1C: CPT

## 2024-10-07 PROCEDURE — 36415 COLL VENOUS BLD VENIPUNCTURE: CPT

## 2024-10-07 PROCEDURE — 85025 COMPLETE CBC W/AUTO DIFF WBC: CPT

## 2024-10-25 ENCOUNTER — OFFICE VISIT (OUTPATIENT)
Dept: INTERNAL MEDICINE CLINIC | Facility: CLINIC | Age: 84
End: 2024-10-25
Payer: MEDICARE

## 2024-10-25 VITALS
BODY MASS INDEX: 24.84 KG/M2 | TEMPERATURE: 97.8 F | WEIGHT: 135 LBS | OXYGEN SATURATION: 98 % | DIASTOLIC BLOOD PRESSURE: 78 MMHG | HEART RATE: 72 BPM | SYSTOLIC BLOOD PRESSURE: 122 MMHG | HEIGHT: 62 IN

## 2024-10-25 DIAGNOSIS — G47.00 INSOMNIA, UNSPECIFIED TYPE: ICD-10-CM

## 2024-10-25 DIAGNOSIS — I10 ESSENTIAL HYPERTENSION: Primary | ICD-10-CM

## 2024-10-25 DIAGNOSIS — L71.9 ROSACEA: ICD-10-CM

## 2024-10-25 DIAGNOSIS — Z00.00 MEDICARE ANNUAL WELLNESS VISIT, SUBSEQUENT: ICD-10-CM

## 2024-10-25 DIAGNOSIS — Z12.31 OTHER SCREENING MAMMOGRAM: ICD-10-CM

## 2024-10-25 DIAGNOSIS — E03.9 ACQUIRED HYPOTHYROIDISM: ICD-10-CM

## 2024-10-25 DIAGNOSIS — D32.9 BENIGN NEOPLASM OF MENINGES, UNSPECIFIED (HCC): ICD-10-CM

## 2024-10-25 PROCEDURE — 99214 OFFICE O/P EST MOD 30 MIN: CPT | Performed by: INTERNAL MEDICINE

## 2024-10-25 PROCEDURE — G0439 PPPS, SUBSEQ VISIT: HCPCS | Performed by: INTERNAL MEDICINE

## 2024-10-25 RX ORDER — LEVOTHYROXINE SODIUM 50 MCG
TABLET ORAL
Qty: 117 TABLET | Refills: 1 | Status: SHIPPED | OUTPATIENT
Start: 2024-10-25

## 2024-10-25 RX ORDER — ZOLPIDEM TARTRATE 5 MG/1
5 TABLET ORAL DAILY
Qty: 30 TABLET | Refills: 2 | Status: SHIPPED | OUTPATIENT
Start: 2024-10-25

## 2024-10-25 RX ORDER — LISINOPRIL 20 MG/1
20 TABLET ORAL DAILY
Qty: 90 TABLET | Refills: 1 | Status: SHIPPED | OUTPATIENT
Start: 2024-10-25

## 2024-10-25 RX ORDER — AMLODIPINE BESYLATE 5 MG/1
5 TABLET ORAL DAILY
Qty: 100 TABLET | Refills: 1 | Status: SHIPPED | OUTPATIENT
Start: 2024-10-25

## 2024-10-25 RX ORDER — METRONIDAZOLE 7.5 MG/G
GEL TOPICAL 2 TIMES DAILY
Qty: 135 G | Refills: 1 | Status: SHIPPED | OUTPATIENT
Start: 2024-10-25

## 2024-10-25 NOTE — PROGRESS NOTES
Assessment/Plan:           1. Essential hypertension  Comments:  Stable continue amlodipine 5 mg daily.  Orders:  -     lisinopril (ZESTRIL) 20 mg tablet; Take 1 tablet (20 mg total) by mouth daily  -     amLODIPine (NORVASC) 5 mg tablet; Take 1 tablet (5 mg total) by mouth daily  2. Insomnia, unspecified type  Comments:  As needed zolpidem.  Advised limiting benzodiazepines  Orders:  -     zolpidem (AMBIEN) 5 mg tablet; Take 1 tablet (5 mg total) by mouth daily  3. Acquired hypothyroidism  Comments:  Labs are reviewed continue levothyroxine.  Orders:  -     Synthroid 50 MCG tablet; TAKE 1 TABLET BY MOUTH  DAILY ON AN EMPTY STOMACH  EXCEPT ON SATURDAY AND  SUNDAY TAKE 2 TABLETS  4. Benign neoplasm of meninges, unspecified (HCC)  Comments:  This is clinically stable.  5. Rosacea  -     metroNIDAZOLE (METROGEL) 0.75 % gel; Apply topically 2 (two) times a day  6. Other screening mammogram  -     Mammo screening bilateral w 3d and cad; Future; Expected date: 10/25/2024  7. Medicare annual wellness visit, subsequent         1. Essential hypertension    - lisinopril (ZESTRIL) 20 mg tablet; Take 1 tablet (20 mg total) by mouth daily  Dispense: 90 tablet; Refill: 1  - amLODIPine (NORVASC) 5 mg tablet; Take 1 tablet (5 mg total) by mouth daily  Dispense: 100 tablet; Refill: 1    2. Insomnia, unspecified type    - zolpidem (AMBIEN) 5 mg tablet; Take 1 tablet (5 mg total) by mouth daily  Dispense: 30 tablet; Refill: 2    3. Acquired hypothyroidism    - Synthroid 50 MCG tablet; TAKE 1 TABLET BY MOUTH  DAILY ON AN EMPTY STOMACH  EXCEPT ON SATURDAY AND  SUNDAY TAKE 2 TABLETS  Dispense: 117 tablet; Refill: 1    4. Essential hypertension    - lisinopril (ZESTRIL) 20 mg tablet; Take 1 tablet (20 mg total) by mouth daily  Dispense: 90 tablet; Refill: 1  - amLODIPine (NORVASC) 5 mg tablet; Take 1 tablet (5 mg total) by mouth daily  Dispense: 100 tablet; Refill: 1    5. Insomnia, unspecified type    - zolpidem (AMBIEN) 5 mg tablet;  Take 1 tablet (5 mg total) by mouth daily  Dispense: 30 tablet; Refill: 2    6. Benign neoplasm of meninges, unspecified (HCC)      7. Hypothyroidism, unspecified type      8. Acquired hypothyroidism    - Synthroid 50 MCG tablet; TAKE 1 TABLET BY MOUTH  DAILY ON AN EMPTY STOMACH  EXCEPT ON SATURDAY AND  SUNDAY TAKE 2 TABLETS  Dispense: 117 tablet; Refill: 1    9. Rosacea    - metroNIDAZOLE (METROGEL) 0.75 % gel; Apply topically 2 (two) times a day  Dispense: 135 g; Refill: 1    10. Other screening mammogram    - Mammo screening bilateral w 3d and cad; Future       No problem-specific Assessment & Plan notes found for this encounter.           Subjective:      Patient ID: Ramona Webb is a 84 y.o. female.    HPI    The following portions of the patient's history were reviewed and updated as appropriate: She  has a past medical history of Disease of thyroid gland, GERD (gastroesophageal reflux disease), and Shoulder impingement.  She   Patient Active Problem List    Diagnosis Date Noted    Prediabetes 07/22/2021    Cold hands 01/20/2021    Acquired hypothyroidism 01/20/2021    Other insomnia 01/20/2021    Rosacea 01/20/2021    Chronic gastritis 11/19/2020    Diverticular disease of colon 11/19/2020    Hemorrhoids 11/19/2020    Meningioma, multiple (HCC) 12/12/2017    Essential hypertension 10/21/2017    Helicobacter pylori gastritis 10/19/2017    Sinus bradycardia 12/01/2014     She  has a past surgical history that includes Femur Surgery (Right); Appendectomy (1993); Oophorectomy; Hysterectomy (1993); and Breast biopsy (Left).  Her family history includes Breast cancer in her family; Breast cancer (age of onset: 39) in her sister; Colon cancer in her family; Colon cancer (age of onset: 86) in her mother; Hypertension in her family and father; No Known Problems in her maternal aunt, maternal aunt, maternal aunt, and paternal aunt; Pancreatic cancer in her sister and sister; Stroke in her family and father.  She   reports that she has never smoked. She has never used smokeless tobacco. She reports that she does not drink alcohol and does not use drugs.  Current Outpatient Medications   Medication Sig Dispense Refill    amLODIPine (NORVASC) 5 mg tablet Take 1 tablet (5 mg total) by mouth daily 100 tablet 1    B Complex Vitamins (VITAMIN-B COMPLEX PO) Take 1 tablet by mouth      Betaine HCl 300 MG TABS Take 2 tablets (600 mg total) by mouth daily as needed (indigestion) 30 tablet 0    lisinopril (ZESTRIL) 20 mg tablet Take 1 tablet (20 mg total) by mouth daily 90 tablet 1    metroNIDAZOLE (METROGEL) 0.75 % gel Apply topically 2 (two) times a day 135 g 1    naproxen (NAPROSYN) 375 mg tablet As needed for pain       omeprazole (PriLOSEC) 20 mg delayed release capsule       pancrelipase, Lip-Prot-Amyl, (CREON) 24,000 units Take 24,000 units of lipase by mouth daily as needed for snacks 30 capsule 0    phenazopyridine (PYRIDIUM) 200 mg tablet Take 1 tablet (200 mg total) by mouth 3 (three) times a day as needed for bladder spasms 30 tablet 0    predniSONE 10 mg tablet       Synthroid 50 MCG tablet TAKE 1 TABLET BY MOUTH  DAILY ON AN EMPTY STOMACH  EXCEPT ON SATURDAY AND  SUNDAY TAKE 2 TABLETS 117 tablet 1    triamcinolone (KENALOG) 0.1 % cream Apply topically 2 (two) times a day as needed      zolpidem (AMBIEN) 5 mg tablet Take 1 tablet (5 mg total) by mouth daily 30 tablet 2     No current facility-administered medications for this visit.     Current Outpatient Medications on File Prior to Visit   Medication Sig    B Complex Vitamins (VITAMIN-B COMPLEX PO) Take 1 tablet by mouth    Betaine HCl 300 MG TABS Take 2 tablets (600 mg total) by mouth daily as needed (indigestion)    naproxen (NAPROSYN) 375 mg tablet As needed for pain     omeprazole (PriLOSEC) 20 mg delayed release capsule     pancrelipase, Lip-Prot-Amyl, (CREON) 24,000 units Take 24,000 units of lipase by mouth daily as needed for snacks    phenazopyridine (PYRIDIUM)  "200 mg tablet Take 1 tablet (200 mg total) by mouth 3 (three) times a day as needed for bladder spasms    predniSONE 10 mg tablet     triamcinolone (KENALOG) 0.1 % cream Apply topically 2 (two) times a day as needed    [DISCONTINUED] amLODIPine (NORVASC) 5 mg tablet TAKE 1 TABLET BY MOUTH DAILY    [DISCONTINUED] lisinopril (ZESTRIL) 20 mg tablet Take 20 mg by mouth daily    [DISCONTINUED] metroNIDAZOLE (METROGEL) 0.75 % gel APPLY TOPICALLY TO AFFECTED  AREA(S) TWICE DAILY    [DISCONTINUED] zolpidem (AMBIEN) 5 mg tablet TAKE 1 TABLET BY MOUTH DAILY     No current facility-administered medications on file prior to visit.     Medications Discontinued During This Encounter   Medication Reason    metroNIDAZOLE (METROGEL) 0.75 % gel Reorder    amLODIPine (NORVASC) 5 mg tablet Reorder    lisinopril (ZESTRIL) 20 mg tablet Reorder    zolpidem (AMBIEN) 5 mg tablet Reorder      She is allergic to no active allergies..    Review of Systems   Constitutional:  Negative for appetite change, chills, fatigue and fever.   HENT:  Negative for sore throat and trouble swallowing.    Eyes:  Negative for redness.   Respiratory:  Negative for shortness of breath.    Cardiovascular:  Negative for chest pain and palpitations.   Gastrointestinal:  Negative for abdominal pain, constipation and diarrhea.   Genitourinary:  Negative for dysuria and hematuria.   Musculoskeletal:  Negative for back pain and neck pain.   Skin:  Negative for rash.   Neurological:  Negative for seizures, weakness and headaches.   Hematological:  Negative for adenopathy.   Psychiatric/Behavioral:  Positive for sleep disturbance. Negative for confusion. The patient is not nervous/anxious.          Objective:      /78 (BP Location: Left arm, Patient Position: Sitting, Cuff Size: Standard)   Pulse 72   Temp 97.8 °F (36.6 °C) (Temporal)   Ht 5' 2\" (1.575 m)   Wt 61.2 kg (135 lb)   SpO2 98%   BMI 24.69 kg/m²     Results Reviewed       None            Recent " Results (from the past 1344 hour(s))   CBC and differential    Collection Time: 10/07/24  7:47 AM   Result Value Ref Range    WBC 6.29 4.31 - 10.16 Thousand/uL    RBC 4.10 3.81 - 5.12 Million/uL    Hemoglobin 12.0 11.5 - 15.4 g/dL    Hematocrit 37.0 34.8 - 46.1 %    MCV 90 82 - 98 fL    MCH 29.3 26.8 - 34.3 pg    MCHC 32.4 31.4 - 37.4 g/dL    RDW 14.1 11.6 - 15.1 %    MPV 9.4 8.9 - 12.7 fL    Platelets 242 149 - 390 Thousands/uL    nRBC 0 /100 WBCs    Segmented % 43 43 - 75 %    Immature Grans % 0 0 - 2 %    Lymphocytes % 46 (H) 14 - 44 %    Monocytes % 8 4 - 12 %    Eosinophils Relative 2 0 - 6 %    Basophils Relative 1 0 - 1 %    Absolute Neutrophils 2.73 1.85 - 7.62 Thousands/µL    Absolute Immature Grans 0.01 0.00 - 0.20 Thousand/uL    Absolute Lymphocytes 2.83 0.60 - 4.47 Thousands/µL    Absolute Monocytes 0.52 0.17 - 1.22 Thousand/µL    Eosinophils Absolute 0.13 0.00 - 0.61 Thousand/µL    Basophils Absolute 0.07 0.00 - 0.10 Thousands/µL   Comprehensive metabolic panel    Collection Time: 10/07/24  7:47 AM   Result Value Ref Range    Sodium 135 135 - 147 mmol/L    Potassium 4.1 3.5 - 5.3 mmol/L    Chloride 105 96 - 108 mmol/L    CO2 25 21 - 32 mmol/L    ANION GAP 5 4 - 13 mmol/L    BUN 20 5 - 25 mg/dL    Creatinine 0.76 0.60 - 1.30 mg/dL    Glucose, Fasting 92 65 - 99 mg/dL    Calcium 8.8 8.4 - 10.2 mg/dL    AST 20 13 - 39 U/L    ALT 17 7 - 52 U/L    Alkaline Phosphatase 42 34 - 104 U/L    Total Protein 6.7 6.4 - 8.4 g/dL    Albumin 3.8 3.5 - 5.0 g/dL    Total Bilirubin 0.36 0.20 - 1.00 mg/dL    eGFR 73 ml/min/1.73sq m   Hemoglobin A1C    Collection Time: 10/07/24  7:47 AM   Result Value Ref Range    Hemoglobin A1C 6.0 (H) Normal 4.0-5.6%; PreDiabetic 5.7-6.4%; Diabetic >=6.5%; Glycemic control for adults with diabetes <7.0% %     mg/dl   T4, free    Collection Time: 10/07/24  7:47 AM   Result Value Ref Range    Free T4 1.04 0.61 - 1.12 ng/dL   TSH, 3rd generation    Collection Time: 10/07/24  7:47 AM    Result Value Ref Range    TSH 3RD GENERATON 5.843 (H) 0.450 - 4.500 uIU/mL   Urinalysis with microscopic    Collection Time: 10/07/24  7:47 AM   Result Value Ref Range    Color, UA Light Yellow     Clarity, UA Turbid     Specific Gravity, UA 1.009 1.003 - 1.030    pH, UA 7.0 4.5, 5.0, 5.5, 6.0, 6.5, 7.0, 7.5, 8.0    Leukocytes, UA Large (A) Negative    Nitrite, UA Negative Negative    Protein, UA Negative Negative mg/dl    Glucose, UA Negative Negative mg/dl    Ketones, UA Negative Negative mg/dl    Urobilinogen, UA <2.0 <2.0 mg/dl mg/dl    Bilirubin, UA Negative Negative    Occult Blood, UA Trace (A) Negative    RBC, UA 10-20 (A) None Seen, 1-2 /hpf    WBC, UA Innumerable (A) None Seen, 1-2 /hpf    Epithelial Cells Occasional None Seen, Occasional /hpf    Bacteria, UA Occasional None Seen, Occasional /hpf    MUCUS THREADS Occasional (A) None Seen    WBC Clumps Present    T3, free    Collection Time: 10/07/24  7:47 AM   Result Value Ref Range    T3, Free 2.21 (L) 2.50 - 3.90 pg/mL        Physical Exam  Constitutional:       General: She is not in acute distress.     Appearance: Normal appearance.   HENT:      Head: Normocephalic and atraumatic.      Nose: Nose normal.      Mouth/Throat:      Mouth: Mucous membranes are moist.   Eyes:      Extraocular Movements: Extraocular movements intact.      Pupils: Pupils are equal, round, and reactive to light.   Cardiovascular:      Rate and Rhythm: Normal rate and regular rhythm.      Pulses: Normal pulses.      Heart sounds: Normal heart sounds. No murmur heard.     No friction rub.   Pulmonary:      Effort: Pulmonary effort is normal. No respiratory distress.      Breath sounds: Normal breath sounds. No wheezing.   Abdominal:      General: Abdomen is flat. Bowel sounds are normal. There is no distension.      Palpations: Abdomen is soft. There is no mass.      Tenderness: There is no abdominal tenderness. There is no guarding.   Musculoskeletal:         General: Normal  range of motion.      Cervical back: Neck supple.   Neurological:      General: No focal deficit present.      Mental Status: She is alert and oriented to person, place, and time. Mental status is at baseline.      Cranial Nerves: No cranial nerve deficit.   Psychiatric:         Mood and Affect: Mood normal.         Behavior: Behavior normal.

## 2024-11-15 ENCOUNTER — TELEPHONE (OUTPATIENT)
Age: 84
End: 2024-11-15

## 2024-11-15 DIAGNOSIS — E03.9 ACQUIRED HYPOTHYROIDISM: ICD-10-CM

## 2024-11-15 DIAGNOSIS — G47.00 INSOMNIA, UNSPECIFIED TYPE: ICD-10-CM

## 2024-11-15 RX ORDER — ZOLPIDEM TARTRATE 5 MG/1
5 TABLET ORAL DAILY
Qty: 30 TABLET | Refills: 1
Start: 2024-11-15 | End: 2024-11-15 | Stop reason: SDUPTHER

## 2024-11-15 RX ORDER — ZOLPIDEM TARTRATE 5 MG/1
5 TABLET ORAL DAILY
Qty: 30 TABLET | Refills: 1 | Status: SHIPPED | OUTPATIENT
Start: 2024-11-15

## 2024-11-15 RX ORDER — LEVOTHYROXINE SODIUM 50 MCG
TABLET ORAL
Qty: 117 TABLET | Refills: 1 | Status: SHIPPED | OUTPATIENT
Start: 2024-11-15

## 2024-11-15 NOTE — TELEPHONE ENCOUNTER
Patient's  would like a print out of her medications for a meeting with someone regarding medicare.     Please mail.     Thank you!

## 2024-11-15 NOTE — TELEPHONE ENCOUNTER
Medication: zolpidem (AMBIEN) 5 mg tablet     Dose/Frequency: Take 1 tablet (5 mg total) by mouth daily     Quantity: 30 tablet     Pharmacy: [facility] [address] [place] [address]     Office:   [x] PCP/Provider - Adrián  [] Speciality/Provider -     Does the patient have enough for 3 days?   [x] Yes   [] No - Send as HP to POD      Medication: Synthroid 50 MCG tablet     Dose/Frequency:     TAKE 1 TABLET BY MOUTH DAILY ON AN EMPTY STOMACH EXCEPT ON SATURDAY AND SUNDAY TAKE 2 TABLETS       Quantity:  117 tablet     Pharmacy: [facility] [address] [place] [address]     Office:   [x] PCP/Provider - Adrián  [] Speciality/Provider -     Does the patient have enough for 3 days?   [x] Yes   [] No - Send as HP to POD

## 2025-02-14 ENCOUNTER — TELEPHONE (OUTPATIENT)
Age: 85
End: 2025-02-14

## 2025-02-14 DIAGNOSIS — E03.9 ACQUIRED HYPOTHYROIDISM: ICD-10-CM

## 2025-02-14 DIAGNOSIS — Z00.00 ROUTINE MEDICAL EXAM: ICD-10-CM

## 2025-02-14 DIAGNOSIS — R73.03 PREDIABETES: ICD-10-CM

## 2025-02-14 DIAGNOSIS — I10 ESSENTIAL HYPERTENSION: Primary | ICD-10-CM

## 2025-02-14 NOTE — TELEPHONE ENCOUNTER
Patient  would like to follow up if the patient will have to do lab order for the appointment on 2/26/25. Please reach out to the patient if she has to do lab work. Thank you

## 2025-02-17 ENCOUNTER — TELEPHONE (OUTPATIENT)
Dept: LAB | Facility: HOSPITAL | Age: 85
End: 2025-02-17

## 2025-02-24 ENCOUNTER — APPOINTMENT (OUTPATIENT)
Dept: LAB | Facility: CLINIC | Age: 85
End: 2025-02-24
Payer: MEDICARE

## 2025-02-24 DIAGNOSIS — Z00.00 ROUTINE MEDICAL EXAM: ICD-10-CM

## 2025-02-24 DIAGNOSIS — I10 ESSENTIAL HYPERTENSION: ICD-10-CM

## 2025-02-24 DIAGNOSIS — R73.03 PREDIABETES: ICD-10-CM

## 2025-02-24 DIAGNOSIS — E03.9 ACQUIRED HYPOTHYROIDISM: ICD-10-CM

## 2025-02-24 LAB
ALBUMIN SERPL BCG-MCNC: 4.1 G/DL (ref 3.5–5)
ALP SERPL-CCNC: 47 U/L (ref 34–104)
ALT SERPL W P-5'-P-CCNC: 18 U/L (ref 7–52)
ANION GAP SERPL CALCULATED.3IONS-SCNC: 4 MMOL/L (ref 4–13)
AST SERPL W P-5'-P-CCNC: 17 U/L (ref 13–39)
BACTERIA UR QL AUTO: ABNORMAL /HPF
BASOPHILS # BLD AUTO: 0.1 THOUSANDS/ÂΜL (ref 0–0.1)
BASOPHILS NFR BLD AUTO: 1 % (ref 0–1)
BILIRUB SERPL-MCNC: 0.43 MG/DL (ref 0.2–1)
BILIRUB UR QL STRIP: NEGATIVE
BUN SERPL-MCNC: 20 MG/DL (ref 5–25)
CALCIUM SERPL-MCNC: 9.1 MG/DL (ref 8.4–10.2)
CHLORIDE SERPL-SCNC: 105 MMOL/L (ref 96–108)
CLARITY UR: CLEAR
CO2 SERPL-SCNC: 27 MMOL/L (ref 21–32)
COLOR UR: COLORLESS
CREAT SERPL-MCNC: 0.78 MG/DL (ref 0.6–1.3)
EOSINOPHIL # BLD AUTO: 0.14 THOUSAND/ÂΜL (ref 0–0.61)
EOSINOPHIL NFR BLD AUTO: 2 % (ref 0–6)
ERYTHROCYTE [DISTWIDTH] IN BLOOD BY AUTOMATED COUNT: 14.2 % (ref 11.6–15.1)
EST. AVERAGE GLUCOSE BLD GHB EST-MCNC: 128 MG/DL
GFR SERPL CREATININE-BSD FRML MDRD: 70 ML/MIN/1.73SQ M
GLUCOSE P FAST SERPL-MCNC: 88 MG/DL (ref 65–99)
GLUCOSE UR STRIP-MCNC: NEGATIVE MG/DL
HBA1C MFR BLD: 6.1 %
HCT VFR BLD AUTO: 39.1 % (ref 34.8–46.1)
HGB BLD-MCNC: 12.5 G/DL (ref 11.5–15.4)
HGB UR QL STRIP.AUTO: NEGATIVE
IMM GRANULOCYTES # BLD AUTO: 0.01 THOUSAND/UL (ref 0–0.2)
IMM GRANULOCYTES NFR BLD AUTO: 0 % (ref 0–2)
KETONES UR STRIP-MCNC: NEGATIVE MG/DL
LEUKOCYTE ESTERASE UR QL STRIP: ABNORMAL
LYMPHOCYTES # BLD AUTO: 3.3 THOUSANDS/ÂΜL (ref 0.6–4.47)
LYMPHOCYTES NFR BLD AUTO: 44 % (ref 14–44)
MCH RBC QN AUTO: 28.5 PG (ref 26.8–34.3)
MCHC RBC AUTO-ENTMCNC: 32 G/DL (ref 31.4–37.4)
MCV RBC AUTO: 89 FL (ref 82–98)
MONOCYTES # BLD AUTO: 0.63 THOUSAND/ÂΜL (ref 0.17–1.22)
MONOCYTES NFR BLD AUTO: 9 % (ref 4–12)
NEUTROPHILS # BLD AUTO: 3.22 THOUSANDS/ÂΜL (ref 1.85–7.62)
NEUTS SEG NFR BLD AUTO: 44 % (ref 43–75)
NITRITE UR QL STRIP: NEGATIVE
NON-SQ EPI CELLS URNS QL MICRO: ABNORMAL /HPF
NRBC BLD AUTO-RTO: 0 /100 WBCS
PH UR STRIP.AUTO: 6.5 [PH]
PLATELET # BLD AUTO: 257 THOUSANDS/UL (ref 149–390)
PMV BLD AUTO: 9.6 FL (ref 8.9–12.7)
POTASSIUM SERPL-SCNC: 4 MMOL/L (ref 3.5–5.3)
PROT SERPL-MCNC: 7.2 G/DL (ref 6.4–8.4)
PROT UR STRIP-MCNC: NEGATIVE MG/DL
RBC # BLD AUTO: 4.39 MILLION/UL (ref 3.81–5.12)
RBC #/AREA URNS AUTO: ABNORMAL /HPF
SODIUM SERPL-SCNC: 136 MMOL/L (ref 135–147)
SP GR UR STRIP.AUTO: 1.01 (ref 1–1.03)
T4 FREE SERPL-MCNC: 1.02 NG/DL (ref 0.61–1.12)
TSH SERPL DL<=0.05 MIU/L-ACNC: 5.97 UIU/ML (ref 0.45–4.5)
UROBILINOGEN UR STRIP-ACNC: <2 MG/DL
WBC # BLD AUTO: 7.4 THOUSAND/UL (ref 4.31–10.16)
WBC #/AREA URNS AUTO: ABNORMAL /HPF

## 2025-02-24 PROCEDURE — 80053 COMPREHEN METABOLIC PANEL: CPT

## 2025-02-24 PROCEDURE — 85025 COMPLETE CBC W/AUTO DIFF WBC: CPT

## 2025-02-24 PROCEDURE — 84443 ASSAY THYROID STIM HORMONE: CPT

## 2025-02-24 PROCEDURE — 84439 ASSAY OF FREE THYROXINE: CPT

## 2025-02-24 PROCEDURE — 36415 COLL VENOUS BLD VENIPUNCTURE: CPT

## 2025-02-24 PROCEDURE — 81001 URINALYSIS AUTO W/SCOPE: CPT

## 2025-02-24 PROCEDURE — 83036 HEMOGLOBIN GLYCOSYLATED A1C: CPT

## 2025-02-26 ENCOUNTER — OFFICE VISIT (OUTPATIENT)
Dept: INTERNAL MEDICINE CLINIC | Facility: CLINIC | Age: 85
End: 2025-02-26
Payer: MEDICARE

## 2025-02-26 VITALS
HEART RATE: 64 BPM | BODY MASS INDEX: 24.84 KG/M2 | HEIGHT: 62 IN | WEIGHT: 135 LBS | SYSTOLIC BLOOD PRESSURE: 132 MMHG | OXYGEN SATURATION: 99 % | DIASTOLIC BLOOD PRESSURE: 58 MMHG

## 2025-02-26 DIAGNOSIS — I10 ESSENTIAL HYPERTENSION: Primary | ICD-10-CM

## 2025-02-26 DIAGNOSIS — G47.00 INSOMNIA, UNSPECIFIED TYPE: ICD-10-CM

## 2025-02-26 DIAGNOSIS — D32.9 BENIGN NEOPLASM OF MENINGES, UNSPECIFIED (HCC): ICD-10-CM

## 2025-02-26 DIAGNOSIS — K29.50 CHRONIC GASTRITIS, PRESENCE OF BLEEDING UNSPECIFIED, UNSPECIFIED GASTRITIS TYPE: ICD-10-CM

## 2025-02-26 DIAGNOSIS — K90.89 OTHER SPECIFIED INTESTINAL MALABSORPTION: ICD-10-CM

## 2025-02-26 DIAGNOSIS — E03.9 ACQUIRED HYPOTHYROIDISM: ICD-10-CM

## 2025-02-26 PROCEDURE — G2211 COMPLEX E/M VISIT ADD ON: HCPCS | Performed by: INTERNAL MEDICINE

## 2025-02-26 PROCEDURE — 99214 OFFICE O/P EST MOD 30 MIN: CPT | Performed by: INTERNAL MEDICINE

## 2025-02-26 RX ORDER — LEVOTHYROXINE SODIUM 50 MCG
TABLET ORAL
Start: 2025-02-26

## 2025-02-26 NOTE — PROGRESS NOTES
Assessment/Plan:           1. Essential hypertension  Comments:  Stable continue current regimen.  2. Insomnia, unspecified type  Comments:  Stable continue zolpidem.  3. Acquired hypothyroidism  Comments:  Labs were reviewed increase levothyroxine to 50 mcg daily and recheck TSH in 2 to 3 months.  Orders:  -     Synthroid 50 MCG tablet; TAKE 1 TABLET BY MOUTH  DAILY ON AN EMPTY STOMACH  -     T4, free; Future  -     TSH, 3rd generation; Future  4. Benign neoplasm of meninges, unspecified (HCC)  5. Chronic gastritis, presence of bleeding unspecified, unspecified gastritis type  6. Other specified intestinal malabsorption  -     pancrelipase, Lip-Prot-Amyl, (CREON) 12,000 units capsule; Take 12,000 units of lipase by mouth 3 (three) times a day with meals         There are no diagnoses linked to this encounter.     No problem-specific Assessment & Plan notes found for this encounter.           Subjective:      Patient ID: Ramona Webb is a 85 y.o. female.    HPI    The following portions of the patient's history were reviewed and updated as appropriate: She  has a past medical history of Disease of thyroid gland, GERD (gastroesophageal reflux disease), and Shoulder impingement.  She   Patient Active Problem List    Diagnosis Date Noted    Prediabetes 07/22/2021    Cold hands 01/20/2021    Acquired hypothyroidism 01/20/2021    Other insomnia 01/20/2021    Rosacea 01/20/2021    Chronic gastritis 11/19/2020    Diverticular disease of colon 11/19/2020    Hemorrhoids 11/19/2020    Meningioma, multiple (HCC) 12/12/2017    Essential hypertension 10/21/2017    Helicobacter pylori gastritis 10/19/2017    Sinus bradycardia 12/01/2014     She  has a past surgical history that includes Femur Surgery (Right); Appendectomy (1993); Oophorectomy; Hysterectomy (1993); and Breast biopsy (Left).  Her family history includes Breast cancer in her family; Breast cancer (age of onset: 39) in her sister; Colon cancer in her family;  Colon cancer (age of onset: 86) in her mother; Hypertension in her family and father; No Known Problems in her maternal aunt, maternal aunt, maternal aunt, and paternal aunt; Pancreatic cancer in her sister and sister; Stroke in her family and father.  She  reports that she has never smoked. She has never used smokeless tobacco. She reports that she does not drink alcohol and does not use drugs.  Current Outpatient Medications   Medication Sig Dispense Refill    amLODIPine (NORVASC) 5 mg tablet Take 1 tablet (5 mg total) by mouth daily 100 tablet 1    B Complex Vitamins (VITAMIN-B COMPLEX PO) Take 1 tablet by mouth      Betaine HCl 300 MG TABS Take 2 tablets (600 mg total) by mouth daily as needed (indigestion) 30 tablet 0    lisinopril (ZESTRIL) 20 mg tablet Take 1 tablet (20 mg total) by mouth daily 90 tablet 1    metroNIDAZOLE (METROGEL) 0.75 % gel Apply topically 2 (two) times a day 135 g 1    naproxen (NAPROSYN) 375 mg tablet As needed for pain       omeprazole (PriLOSEC) 20 mg delayed release capsule       pancrelipase, Lip-Prot-Amyl, (CREON) 12,000 units capsule Take 12,000 units of lipase by mouth 3 (three) times a day with meals 90 capsule 5    phenazopyridine (PYRIDIUM) 200 mg tablet Take 1 tablet (200 mg total) by mouth 3 (three) times a day as needed for bladder spasms 30 tablet 0    predniSONE 10 mg tablet       Synthroid 50 MCG tablet TAKE 1 TABLET BY MOUTH  DAILY ON AN EMPTY STOMACH      triamcinolone (KENALOG) 0.1 % cream Apply topically 2 (two) times a day as needed      zolpidem (AMBIEN) 5 mg tablet Take 1 tablet (5 mg total) by mouth daily 30 tablet 1     No current facility-administered medications for this visit.     Current Outpatient Medications on File Prior to Visit   Medication Sig    amLODIPine (NORVASC) 5 mg tablet Take 1 tablet (5 mg total) by mouth daily    B Complex Vitamins (VITAMIN-B COMPLEX PO) Take 1 tablet by mouth    Betaine HCl 300 MG TABS Take 2 tablets (600 mg total) by mouth  daily as needed (indigestion)    lisinopril (ZESTRIL) 20 mg tablet Take 1 tablet (20 mg total) by mouth daily    metroNIDAZOLE (METROGEL) 0.75 % gel Apply topically 2 (two) times a day    naproxen (NAPROSYN) 375 mg tablet As needed for pain     omeprazole (PriLOSEC) 20 mg delayed release capsule     phenazopyridine (PYRIDIUM) 200 mg tablet Take 1 tablet (200 mg total) by mouth 3 (three) times a day as needed for bladder spasms    predniSONE 10 mg tablet     triamcinolone (KENALOG) 0.1 % cream Apply topically 2 (two) times a day as needed    zolpidem (AMBIEN) 5 mg tablet Take 1 tablet (5 mg total) by mouth daily    [DISCONTINUED] pancrelipase, Lip-Prot-Amyl, (CREON) 24,000 units Take 24,000 units of lipase by mouth daily as needed for snacks    [DISCONTINUED] Synthroid 50 MCG tablet TAKE 1 TABLET BY MOUTH  DAILY ON AN EMPTY STOMACH  EXCEPT ON SATURDAY AND  SUNDAY TAKE 2 TABLETS     No current facility-administered medications on file prior to visit.     Medications Discontinued During This Encounter   Medication Reason    Synthroid 50 MCG tablet Reorder    pancrelipase, Lip-Prot-Amyl, (CREON) 24,000 units Reorder    pancrelipase, Lip-Prot-Amyl, (CREON) 24,000 units     pancrelipase, Lip-Prot-Amyl, (CREON) 12,000 units capsule       She is allergic to no active allergies..    Review of Systems   Constitutional:  Negative for appetite change, chills, fatigue and fever.   HENT:  Negative for sore throat and trouble swallowing.    Eyes:  Negative for redness.   Respiratory:  Negative for shortness of breath.    Cardiovascular:  Negative for chest pain and palpitations.   Gastrointestinal:  Negative for abdominal pain, constipation and diarrhea.   Genitourinary:  Negative for dysuria and hematuria.   Musculoskeletal:  Negative for back pain and neck pain.   Skin:  Negative for rash.   Neurological:  Negative for seizures, weakness and headaches.   Hematological:  Negative for adenopathy.   Psychiatric/Behavioral:   "Negative for confusion. The patient is not nervous/anxious.          Objective:      /58 (Patient Position: Sitting, Cuff Size: Adult)   Pulse 64   Ht 5' 2\" (1.575 m)   Wt 61.2 kg (135 lb)   SpO2 99%   BMI 24.69 kg/m²     Results Reviewed       None            Recent Results (from the past 8 weeks)   CBC and differential    Collection Time: 02/24/25  7:09 AM   Result Value Ref Range    WBC 7.40 4.31 - 10.16 Thousand/uL    RBC 4.39 3.81 - 5.12 Million/uL    Hemoglobin 12.5 11.5 - 15.4 g/dL    Hematocrit 39.1 34.8 - 46.1 %    MCV 89 82 - 98 fL    MCH 28.5 26.8 - 34.3 pg    MCHC 32.0 31.4 - 37.4 g/dL    RDW 14.2 11.6 - 15.1 %    MPV 9.6 8.9 - 12.7 fL    Platelets 257 149 - 390 Thousands/uL    nRBC 0 /100 WBCs    Segmented % 44 43 - 75 %    Immature Grans % 0 0 - 2 %    Lymphocytes % 44 14 - 44 %    Monocytes % 9 4 - 12 %    Eosinophils Relative 2 0 - 6 %    Basophils Relative 1 0 - 1 %    Absolute Neutrophils 3.22 1.85 - 7.62 Thousands/µL    Absolute Immature Grans 0.01 0.00 - 0.20 Thousand/uL    Absolute Lymphocytes 3.30 0.60 - 4.47 Thousands/µL    Absolute Monocytes 0.63 0.17 - 1.22 Thousand/µL    Eosinophils Absolute 0.14 0.00 - 0.61 Thousand/µL    Basophils Absolute 0.10 0.00 - 0.10 Thousands/µL   Comprehensive metabolic panel    Collection Time: 02/24/25  7:09 AM   Result Value Ref Range    Sodium 136 135 - 147 mmol/L    Potassium 4.0 3.5 - 5.3 mmol/L    Chloride 105 96 - 108 mmol/L    CO2 27 21 - 32 mmol/L    ANION GAP 4 4 - 13 mmol/L    BUN 20 5 - 25 mg/dL    Creatinine 0.78 0.60 - 1.30 mg/dL    Glucose, Fasting 88 65 - 99 mg/dL    Calcium 9.1 8.4 - 10.2 mg/dL    AST 17 13 - 39 U/L    ALT 18 7 - 52 U/L    Alkaline Phosphatase 47 34 - 104 U/L    Total Protein 7.2 6.4 - 8.4 g/dL    Albumin 4.1 3.5 - 5.0 g/dL    Total Bilirubin 0.43 0.20 - 1.00 mg/dL    eGFR 70 ml/min/1.73sq m   Hemoglobin A1C    Collection Time: 02/24/25  7:09 AM   Result Value Ref Range    Hemoglobin A1C 6.1 (H) Normal 4.0-5.6%; " PreDiabetic 5.7-6.4%; Diabetic >=6.5%; Glycemic control for adults with diabetes <7.0% %     mg/dl   Urinalysis with microscopic    Collection Time: 02/24/25  7:09 AM   Result Value Ref Range    Color, UA Colorless     Clarity, UA Clear     Specific Gravity, UA 1.009 1.003 - 1.030    pH, UA 6.5 4.5, 5.0, 5.5, 6.0, 6.5, 7.0, 7.5, 8.0    Leukocytes, UA Large (A) Negative    Nitrite, UA Negative Negative    Protein, UA Negative Negative mg/dl    Glucose, UA Negative Negative mg/dl    Ketones, UA Negative Negative mg/dl    Urobilinogen, UA <2.0 <2.0 mg/dl mg/dl    Bilirubin, UA Negative Negative    Occult Blood, UA Negative Negative    RBC, UA 1-2 None Seen, 1-2 /hpf    WBC, UA 20-30 (A) None Seen, 1-2 /hpf    Epithelial Cells Occasional None Seen, Occasional /hpf    Bacteria, UA Occasional None Seen, Occasional /hpf   TSH, 3rd generation    Collection Time: 02/24/25  7:09 AM   Result Value Ref Range    TSH 3RD GENERATON 5.970 (H) 0.450 - 4.500 uIU/mL   T4, free    Collection Time: 02/24/25  7:09 AM   Result Value Ref Range    Free T4 1.02 0.61 - 1.12 ng/dL        Physical Exam  Constitutional:       General: She is not in acute distress.     Appearance: Normal appearance.   HENT:      Head: Normocephalic and atraumatic.      Nose: Nose normal.      Mouth/Throat:      Mouth: Mucous membranes are moist.   Eyes:      Extraocular Movements: Extraocular movements intact.      Pupils: Pupils are equal, round, and reactive to light.   Cardiovascular:      Rate and Rhythm: Normal rate and regular rhythm.      Pulses: Normal pulses.      Heart sounds: Normal heart sounds. No murmur heard.     No friction rub.   Pulmonary:      Effort: Pulmonary effort is normal. No respiratory distress.      Breath sounds: Normal breath sounds. No wheezing.   Abdominal:      General: Abdomen is flat. Bowel sounds are normal. There is no distension.      Palpations: Abdomen is soft. There is no mass.      Tenderness: There is no abdominal  tenderness. There is no guarding.   Musculoskeletal:         General: Normal range of motion.      Cervical back: Neck supple.   Neurological:      General: No focal deficit present.      Mental Status: She is alert and oriented to person, place, and time. Mental status is at baseline.      Cranial Nerves: No cranial nerve deficit.   Psychiatric:         Mood and Affect: Mood normal.         Behavior: Behavior normal.

## 2025-04-28 ENCOUNTER — APPOINTMENT (OUTPATIENT)
Dept: LAB | Facility: CLINIC | Age: 85
End: 2025-04-28
Attending: INTERNAL MEDICINE
Payer: MEDICARE

## 2025-04-28 DIAGNOSIS — E03.9 ACQUIRED HYPOTHYROIDISM: ICD-10-CM

## 2025-04-28 LAB
T4 FREE SERPL-MCNC: 1.2 NG/DL (ref 0.61–1.12)
TSH SERPL DL<=0.05 MIU/L-ACNC: 2.25 UIU/ML (ref 0.45–4.5)

## 2025-04-28 PROCEDURE — 84439 ASSAY OF FREE THYROXINE: CPT

## 2025-04-28 PROCEDURE — 36415 COLL VENOUS BLD VENIPUNCTURE: CPT

## 2025-04-28 PROCEDURE — 84443 ASSAY THYROID STIM HORMONE: CPT

## 2025-05-04 DIAGNOSIS — I10 ESSENTIAL HYPERTENSION: ICD-10-CM

## 2025-05-04 DIAGNOSIS — E03.9 ACQUIRED HYPOTHYROIDISM: ICD-10-CM

## 2025-05-05 RX ORDER — LEVOTHYROXINE SODIUM 50 MCG
TABLET ORAL
Qty: 117 TABLET | Refills: 1 | Status: SHIPPED | OUTPATIENT
Start: 2025-05-05

## 2025-05-05 RX ORDER — LISINOPRIL 20 MG/1
20 TABLET ORAL DAILY
Qty: 90 TABLET | Refills: 1 | Status: SHIPPED | OUTPATIENT
Start: 2025-05-05

## 2025-05-15 DIAGNOSIS — G47.00 INSOMNIA, UNSPECIFIED TYPE: ICD-10-CM

## 2025-05-15 NOTE — TELEPHONE ENCOUNTER
11/15/24 script has , pharmacy requesting new script     Reason for call:   [x] Refill   [] Prior Auth  [] Other:     Office:   [x] PCP/Provider - Phuc Sampson   [] Specialty/Provider -     Medication: zolpidem (AMBIEN) 5 mg tablet     Dose/Frequency: Take 1 tablet (5 mg total) by mouth daily     Quantity: 30    Pharmacy: [facility]   Does the patient have enough for 3 days?   [] Yes   [x] No - Send as HP to POD

## 2025-05-16 RX ORDER — ZOLPIDEM TARTRATE 5 MG/1
5 TABLET ORAL DAILY
Qty: 30 TABLET | Refills: 0 | Status: SHIPPED | OUTPATIENT
Start: 2025-05-16

## 2025-05-30 ENCOUNTER — TELEPHONE (OUTPATIENT)
Age: 85
End: 2025-05-30

## 2025-05-30 NOTE — TELEPHONE ENCOUNTER
Pt's spouse called for [facility] but she wasn't available. He wanted Dr Sampson to put labs on chart T3, T4 and TSH so they can do that before upcoming appt.

## 2025-05-30 NOTE — TELEPHONE ENCOUNTER
Patient's  called in regards to patient's recent thyroid labs.  He would like to speak with Eden when available.  Please call  Misael.

## 2025-06-03 NOTE — TELEPHONE ENCOUNTER
Spoke with patients  he understood about the lab test wife has ceci on 06/20/2025 he will talk to Dr Sampson then as well then

## 2025-06-20 ENCOUNTER — OFFICE VISIT (OUTPATIENT)
Dept: INTERNAL MEDICINE CLINIC | Facility: CLINIC | Age: 85
End: 2025-06-20
Payer: MEDICARE

## 2025-06-20 VITALS
HEART RATE: 59 BPM | SYSTOLIC BLOOD PRESSURE: 118 MMHG | OXYGEN SATURATION: 97 % | TEMPERATURE: 98.7 F | DIASTOLIC BLOOD PRESSURE: 58 MMHG | HEIGHT: 62 IN | WEIGHT: 128 LBS | BODY MASS INDEX: 23.55 KG/M2

## 2025-06-20 DIAGNOSIS — I10 ESSENTIAL HYPERTENSION: Primary | ICD-10-CM

## 2025-06-20 DIAGNOSIS — G47.00 INSOMNIA, UNSPECIFIED TYPE: ICD-10-CM

## 2025-06-20 DIAGNOSIS — R41.89 COGNITIVE DECLINE: ICD-10-CM

## 2025-06-20 DIAGNOSIS — R73.01 IMPAIRED FASTING BLOOD SUGAR: ICD-10-CM

## 2025-06-20 DIAGNOSIS — D32.9 BENIGN NEOPLASM OF MENINGES, UNSPECIFIED (HCC): ICD-10-CM

## 2025-06-20 DIAGNOSIS — E03.9 ACQUIRED HYPOTHYROIDISM: ICD-10-CM

## 2025-06-20 PROCEDURE — G2211 COMPLEX E/M VISIT ADD ON: HCPCS | Performed by: INTERNAL MEDICINE

## 2025-06-20 PROCEDURE — 99214 OFFICE O/P EST MOD 30 MIN: CPT | Performed by: INTERNAL MEDICINE

## 2025-06-20 RX ORDER — TERBINAFINE HYDROCHLORIDE 250 MG/1
250 TABLET ORAL
COMMUNITY
Start: 2025-04-23 | End: 2025-06-20

## 2025-06-20 RX ORDER — ZOLPIDEM TARTRATE 5 MG/1
5 TABLET ORAL DAILY
Qty: 30 TABLET | Refills: 1 | Status: SHIPPED | OUTPATIENT
Start: 2025-06-20

## 2025-06-20 NOTE — PROGRESS NOTES
Assessment/Plan:           1. Essential hypertension  Comments:  Stable continue amlodipine and lisinopril.  2. Acquired hypothyroidism  Comments:  Continue Synthroid.  3. Benign neoplasm of meninges, unspecified (HCC)  4. Impaired fasting blood sugar  Comments:  Continue diabetic diet and continue to monitor.  5. Insomnia, unspecified type  Comments:  As needed zolpidem.  Advised limiting benzodiazepines  Orders:  -     zolpidem (AMBIEN) 5 mg tablet; Take 1 tablet (5 mg total) by mouth daily  6. Cognitive decline         There are no diagnoses linked to this encounter.     No problem-specific Assessment & Plan notes found for this encounter.           Subjective:      Patient ID: Ramona Webb is a 85 y.o. female.    HPI    The following portions of the patient's history were reviewed and updated as appropriate: She  has a past medical history of Disease of thyroid gland, GERD (gastroesophageal reflux disease), and Shoulder impingement.  She   Patient Active Problem List    Diagnosis Date Noted    Prediabetes 07/22/2021    Cold hands 01/20/2021    Acquired hypothyroidism 01/20/2021    Other insomnia 01/20/2021    Rosacea 01/20/2021    Chronic gastritis 11/19/2020    Diverticular disease of colon 11/19/2020    Hemorrhoids 11/19/2020    Meningioma, multiple (HCC) 12/12/2017    Essential hypertension 10/21/2017    Helicobacter pylori gastritis 10/19/2017    Sinus bradycardia 12/01/2014     She  has a past surgical history that includes Femur Surgery (Right); Appendectomy (1993); Oophorectomy; Hysterectomy (1993); and Breast biopsy (Left).  Her family history includes Breast cancer in her family; Breast cancer (age of onset: 39) in her sister; Colon cancer in her family; Colon cancer (age of onset: 86) in her mother; Hypertension in her family and father; No Known Problems in her maternal aunt, maternal aunt, maternal aunt, and paternal aunt; Pancreatic cancer in her sister and sister; Stroke in her family and  father.  She  reports that she has never smoked. She has never used smokeless tobacco. She reports that she does not drink alcohol and does not use drugs.  Current Outpatient Medications   Medication Sig Dispense Refill    amLODIPine (NORVASC) 5 mg tablet Take 1 tablet (5 mg total) by mouth daily 100 tablet 1    B Complex Vitamins (VITAMIN-B COMPLEX PO) Take 1 tablet by mouth      Betaine HCl 300 MG TABS Take 2 tablets (600 mg total) by mouth daily as needed (indigestion) 30 tablet 0    lisinopril (ZESTRIL) 20 mg tablet TAKE ONE TABLET BY MOUTH EVERY DAY 90 tablet 1    metroNIDAZOLE (METROGEL) 0.75 % gel Apply topically 2 (two) times a day 135 g 1    naproxen (NAPROSYN) 375 mg tablet As needed for pain      omeprazole (PriLOSEC) 20 mg delayed release capsule       pancrelipase, Lip-Prot-Amyl, (CREON) 12,000 units capsule Take 12,000 units of lipase by mouth 3 (three) times a day with meals 90 capsule 5    phenazopyridine (PYRIDIUM) 200 mg tablet Take 1 tablet (200 mg total) by mouth 3 (three) times a day as needed for bladder spasms 30 tablet 0    Synthroid 50 MCG tablet TAKE ONE TABLET BY MOUTH EVERY DAY ON AN EMPTY STOMACH EXCEPT ON SATURDAY AND SUNDAY TAKE TWO TABLETS. 117 tablet 1    triamcinolone (KENALOG) 0.1 % cream Apply topically as needed in the morning and as needed in the evening.      zolpidem (AMBIEN) 5 mg tablet Take 1 tablet (5 mg total) by mouth daily 30 tablet 1     No current facility-administered medications for this visit.     Current Outpatient Medications on File Prior to Visit   Medication Sig    amLODIPine (NORVASC) 5 mg tablet Take 1 tablet (5 mg total) by mouth daily    B Complex Vitamins (VITAMIN-B COMPLEX PO) Take 1 tablet by mouth    Betaine HCl 300 MG TABS Take 2 tablets (600 mg total) by mouth daily as needed (indigestion)    lisinopril (ZESTRIL) 20 mg tablet TAKE ONE TABLET BY MOUTH EVERY DAY    metroNIDAZOLE (METROGEL) 0.75 % gel Apply topically 2 (two) times a day    naproxen  (NAPROSYN) 375 mg tablet As needed for pain    omeprazole (PriLOSEC) 20 mg delayed release capsule     pancrelipase, Lip-Prot-Amyl, (CREON) 12,000 units capsule Take 12,000 units of lipase by mouth 3 (three) times a day with meals    phenazopyridine (PYRIDIUM) 200 mg tablet Take 1 tablet (200 mg total) by mouth 3 (three) times a day as needed for bladder spasms    Synthroid 50 MCG tablet TAKE ONE TABLET BY MOUTH EVERY DAY ON AN EMPTY STOMACH EXCEPT ON SATURDAY AND SUNDAY TAKE TWO TABLETS.    triamcinolone (KENALOG) 0.1 % cream Apply topically as needed in the morning and as needed in the evening.    [DISCONTINUED] predniSONE 10 mg tablet     [DISCONTINUED] zolpidem (AMBIEN) 5 mg tablet Take 1 tablet (5 mg total) by mouth daily    [DISCONTINUED] terbinafine (LamISIL) 250 mg tablet Take 250 mg by mouth (Patient not taking: Reported on 6/20/2025)     No current facility-administered medications on file prior to visit.     Medications Discontinued During This Encounter   Medication Reason    terbinafine (LamISIL) 250 mg tablet     zolpidem (AMBIEN) 5 mg tablet Reorder    predniSONE 10 mg tablet       She is allergic to no active allergies..    Review of Systems   Constitutional:  Negative for appetite change, chills, fatigue and fever.   HENT:  Negative for sore throat and trouble swallowing.    Eyes:  Negative for redness.   Respiratory:  Negative for shortness of breath.    Cardiovascular:  Negative for chest pain and palpitations.   Gastrointestinal:  Negative for abdominal pain, constipation and diarrhea.   Genitourinary:  Negative for dysuria and hematuria.   Musculoskeletal:  Negative for back pain and neck pain.   Skin:  Negative for rash.   Neurological:  Negative for seizures, weakness and headaches.   Hematological:  Negative for adenopathy.   Psychiatric/Behavioral:  Negative for confusion. The patient is not nervous/anxious.          Objective:      /58 (BP Location: Left arm, Patient Position:  "Sitting, Cuff Size: Standard)   Pulse 59   Temp 98.7 °F (37.1 °C) (Temporal)   Ht 5' 2\" (1.575 m)   Wt 58.1 kg (128 lb)   SpO2 97%   BMI 23.41 kg/m²     Results Reviewed       None            Recent Results (from the past 8 weeks)   T4, free    Collection Time: 04/28/25 10:14 AM   Result Value Ref Range    Free T4 1.20 (H) 0.61 - 1.12 ng/dL   TSH, 3rd generation    Collection Time: 04/28/25 10:14 AM   Result Value Ref Range    TSH 3RD GENERATION 2.248 0.450 - 4.500 uIU/mL        Physical Exam  Constitutional:       General: She is not in acute distress.     Appearance: Normal appearance.   HENT:      Head: Normocephalic and atraumatic.      Nose: Nose normal.      Mouth/Throat:      Mouth: Mucous membranes are moist.     Eyes:      Extraocular Movements: Extraocular movements intact.      Pupils: Pupils are equal, round, and reactive to light.       Cardiovascular:      Rate and Rhythm: Normal rate and regular rhythm.      Pulses: Normal pulses.      Heart sounds: Normal heart sounds. No murmur heard.     No friction rub.   Pulmonary:      Effort: Pulmonary effort is normal. No respiratory distress.      Breath sounds: Normal breath sounds. No wheezing.   Abdominal:      General: Abdomen is flat. Bowel sounds are normal. There is no distension.      Palpations: Abdomen is soft. There is no mass.      Tenderness: There is no abdominal tenderness. There is no guarding.     Musculoskeletal:         General: Normal range of motion.      Cervical back: Neck supple.     Neurological:      General: No focal deficit present.      Mental Status: She is alert and oriented to person, place, and time. Mental status is at baseline.      Cranial Nerves: No cranial nerve deficit.     Psychiatric:         Mood and Affect: Mood normal.         Behavior: Behavior normal.         "

## 2025-07-21 ENCOUNTER — OFFICE VISIT (OUTPATIENT)
Dept: INTERNAL MEDICINE CLINIC | Facility: CLINIC | Age: 85
End: 2025-07-21
Payer: MEDICARE

## 2025-07-21 VITALS
HEART RATE: 72 BPM | TEMPERATURE: 98.2 F | BODY MASS INDEX: 23.55 KG/M2 | DIASTOLIC BLOOD PRESSURE: 60 MMHG | WEIGHT: 128 LBS | SYSTOLIC BLOOD PRESSURE: 118 MMHG | OXYGEN SATURATION: 98 % | HEIGHT: 62 IN

## 2025-07-21 DIAGNOSIS — E03.9 ACQUIRED HYPOTHYROIDISM: ICD-10-CM

## 2025-07-21 DIAGNOSIS — R41.89 COGNITIVE DECLINE: ICD-10-CM

## 2025-07-21 DIAGNOSIS — L71.9 ROSACEA: ICD-10-CM

## 2025-07-21 DIAGNOSIS — I10 ESSENTIAL HYPERTENSION: Primary | ICD-10-CM

## 2025-07-21 DIAGNOSIS — G47.00 INSOMNIA, UNSPECIFIED TYPE: ICD-10-CM

## 2025-07-21 DIAGNOSIS — K90.89 OTHER SPECIFIED INTESTINAL MALABSORPTION: ICD-10-CM

## 2025-07-21 DIAGNOSIS — D32.9 BENIGN NEOPLASM OF MENINGES, UNSPECIFIED (HCC): ICD-10-CM

## 2025-07-21 DIAGNOSIS — R73.01 IMPAIRED FASTING BLOOD SUGAR: ICD-10-CM

## 2025-07-21 DIAGNOSIS — Z00.00 MEDICARE ANNUAL WELLNESS VISIT, SUBSEQUENT: ICD-10-CM

## 2025-07-21 PROCEDURE — G0439 PPPS, SUBSEQ VISIT: HCPCS | Performed by: INTERNAL MEDICINE

## 2025-07-21 PROCEDURE — 99214 OFFICE O/P EST MOD 30 MIN: CPT | Performed by: INTERNAL MEDICINE

## 2025-07-21 RX ORDER — METRONIDAZOLE TOPICAL 7.5 MG/G
GEL TOPICAL 2 TIMES DAILY
Qty: 135 G | Refills: 1 | Status: CANCELLED | OUTPATIENT
Start: 2025-07-21

## 2025-07-21 NOTE — PATIENT INSTRUCTIONS
Medicare Preventive Visit Patient Instructions  Thank you for completing your Welcome to Medicare Visit or Medicare Annual Wellness Visit today. Your next wellness visit will be due in one year (7/22/2026).  The screening/preventive services that you may require over the next 5-10 years are detailed below. Some tests may not apply to you based off risk factors and/or age. Screening tests ordered at today's visit but not completed yet may show as past due. Also, please note that scanned in results may not display below.  Preventive Screenings:  Service Recommendations Previous Testing/Comments   Colorectal Cancer Screening  * Colonoscopy    * Fecal Occult Blood Test (FOBT)/Fecal Immunochemical Test (FIT)  * Fecal DNA/Cologuard Test  * Flexible Sigmoidoscopy Age: 45-75 years old   Colonoscopy: every 10 years (may be performed more frequently if at higher risk)  OR  FOBT/FIT: every 1 year  OR  Cologuard: every 3 years  OR  Sigmoidoscopy: every 5 years  Screening may be recommended earlier than age 45 if at higher risk for colorectal cancer. Also, an individualized decision between you and your healthcare provider will decide whether screening between the ages of 76-85 would be appropriate. Colonoscopy: 11/30/2020  FOBT/FIT: Not on file  Cologuard: Not on file  Sigmoidoscopy: Not on file          Breast Cancer Screening Age: 40+ years old  Frequency: every 1-2 years  Not required if history of left and right mastectomy Mammogram: 11/02/2023    Screening Current   Cervical Cancer Screening Between the ages of 21-29, pap smear recommended once every 3 years.   Between the ages of 30-65, can perform pap smear with HPV co-testing every 5 years.   Recommendations may differ for women with a history of total hysterectomy, cervical cancer, or abnormal pap smears in past. Pap Smear: Not on file    Screening Not Indicated   Hepatitis C Screening Once for adults born between 1945 and 1965  More frequently in patients at high risk  for Hepatitis C Hep C Antibody: Not on file        Diabetes Screening 1-2 times per year if you're at risk for diabetes or have pre-diabetes Fasting glucose: 88 mg/dL (2/24/2025)  A1C: 6.1 % (2/24/2025)  Screening Current   Cholesterol Screening Once every 5 years if you don't have a lipid disorder. May order more often based on risk factors. Lipid panel: 01/12/2024    Screening Current     Other Preventive Screenings Covered by Medicare:  Abdominal Aortic Aneurysm (AAA) Screening: covered once if your at risk. You're considered to be at risk if you have a family history of AAA.  Lung Cancer Screening: covers low dose CT scan once per year if you meet all of the following conditions: (1) Age 55-77; (2) No signs or symptoms of lung cancer; (3) Current smoker or have quit smoking within the last 15 years; (4) You have a tobacco smoking history of at least 20 pack years (packs per day multiplied by number of years you smoked); (5) You get a written order from a healthcare provider.  Glaucoma Screening: covered annually if you're considered high risk: (1) You have diabetes OR (2) Family history of glaucoma OR (3)  aged 50 and older OR (4)  American aged 65 and older  Osteoporosis Screening: covered every 2 years if you meet one of the following conditions: (1) You're estrogen deficient and at risk for osteoporosis based off medical history and other findings; (2) Have a vertebral abnormality; (3) On glucocorticoid therapy for more than 3 months; (4) Have primary hyperparathyroidism; (5) On osteoporosis medications and need to assess response to drug therapy.   Last bone density test (DXA Scan): 07/18/2022.  HIV Screening: covered annually if you're between the age of 15-65. Also covered annually if you are younger than 15 and older than 65 with risk factors for HIV infection. For pregnant patients, it is covered up to 3 times per pregnancy.    Immunizations:  Immunization Recommendations    Influenza Vaccine Annual influenza vaccination during flu season is recommended for all persons aged >= 6 months who do not have contraindications   Pneumococcal Vaccine   * Pneumococcal conjugate vaccine = PCV13 (Prevnar 13), PCV15 (Vaxneuvance), PCV20 (Prevnar 20)  * Pneumococcal polysaccharide vaccine = PPSV23 (Pneumovax) Adults 19-63 yo with certain risk factors or if 65+ yo  If never received any pneumonia vaccine: recommend Prevnar 20 (PCV20)  Give PCV20 if previously received 1 dose of PCV13 or PPSV23   Hepatitis B Vaccine 3 dose series if at intermediate or high risk (ex: diabetes, end stage renal disease, liver disease)   Respiratory syncytial virus (RSV) Vaccine - COVERED BY MEDICARE PART D  * RSVPreF3 (Arexvy) CDC recommends that adults 60 years of age and older may receive a single dose of RSV vaccine using shared clinical decision-making (SCDM)   Tetanus (Td) Vaccine - COST NOT COVERED BY MEDICARE PART B Following completion of primary series, a booster dose should be given every 10 years to maintain immunity against tetanus. Td may also be given as tetanus wound prophylaxis.   Tdap Vaccine - COST NOT COVERED BY MEDICARE PART B Recommended at least once for all adults. For pregnant patients, recommended with each pregnancy.   Shingles Vaccine (Shingrix) - COST NOT COVERED BY MEDICARE PART B  2 shot series recommended in those 19 years and older who have or will have weakened immune systems or those 50 years and older     Health Maintenance Due:  There are no preventive care reminders to display for this patient.  Immunizations Due:      Topic Date Due   • Pneumococcal Vaccine: 50+ Years (1 of 1 - PCV) Never done   • COVID-19 Vaccine (3 - 2024-25 season) 09/01/2024   • Influenza Vaccine (1) 09/01/2025     Advance Directives   What are advance directives?  Advance directives are legal documents that state your wishes and plans for medical care. These plans are made ahead of time in case you lose your  ability to make decisions for yourself. Advance directives can apply to any medical decision, such as the treatments you want, and if you want to donate organs.   What are the types of advance directives?  There are many types of advance directives, and each state has rules about how to use them. You may choose a combination of any of the following:  Living will:  This is a written record of the treatment you want. You can also choose which treatments you do not want, which to limit, and which to stop at a certain time. This includes surgery, medicine, IV fluid, and tube feedings.   Durable power of  for healthcare (DPAHC):  This is a written record that states who you want to make healthcare choices for you when you are unable to make them for yourself. This person, called a proxy, is usually a family member or a friend. You may choose more than 1 proxy.  Do not resuscitate (DNR) order:  A DNR order is used in case your heart stops beating or you stop breathing. It is a request not to have certain forms of treatment, such as CPR. A DNR order may be included in other types of advance directives.  Medical directive:  This covers the care that you want if you are in a coma, near death, or unable to make decisions for yourself. You can list the treatments you want for each condition. Treatment may include pain medicine, surgery, blood transfusions, dialysis, IV or tube feedings, and a ventilator (breathing machine).  Values history:  This document has questions about your views, beliefs, and how you feel and think about life. This information can help others choose the care that you would choose.  Why are advance directives important?  An advance directive helps you control your care. Although spoken wishes may be used, it is better to have your wishes written down. Spoken wishes can be misunderstood, or not followed. Treatments may be given even if you do not want them. An advance directive may make it easier  for your family to make difficult choices about your care.   Urinary Incontinence   Urinary incontinence (UI)  is when you lose control of your bladder. UI develops because your bladder cannot store or empty urine properly. The 3 most common types of UI are stress incontinence, urge incontinence, or both.  Medicines:   May be given to help strengthen your bladder control. Report any side effects of medication to your healthcare provider.  Do pelvic muscle exercises often:  Your pelvic muscles help you stop urinating. Squeeze these muscles tight for 5 seconds, then relax for 5 seconds. Gradually work up to squeezing for 10 seconds. Do 3 sets of 15 repetitions a day, or as directed. This will help strengthen your pelvic muscles and improve bladder control.  Train your bladder:  Go to the bathroom at set times, such as every 2 hours, even if you do not feel the urge to go. You can also try to hold your urine when you feel the urge to go. For example, hold your urine for 5 minutes when you feel the urge to go. As that becomes easier, hold your urine for 10 minutes.   Self-care:   Keep a UI record.  Write down how often you leak urine and how much you leak. Make a note of what you were doing when you leaked urine.  Drink liquids as directed. You may need to limit the amount of liquid you drink to help control your urine leakage. Do not drink any liquid right before you go to bed. Limit or do not have drinks that contain caffeine or alcohol.   Prevent constipation.  Eat a variety of high-fiber foods. Good examples are high-fiber cereals, beans, vegetables, and whole-grain breads. Walking is the best way to trigger your intestines to have a bowel movement.  Exercise regularly and maintain a healthy weight.  Weight loss and exercise will decrease pressure on your bladder and help you control your leakage.   Use a catheter as directed  to help empty your bladder. A catheter is a tiny, plastic tube that is put into your  bladder to drain your urine.   Go to behavior therapy as directed.  Behavior therapy may be used to help you learn to control your urge to urinate.     © Copyright [employer] 2018 Information is for End User's use only and may not be sold, redistributed or otherwise used for commercial purposes. All illustrations and images included in CareNotes® are the copyrighted property of [employer] or [employer] [facility]

## 2025-07-21 NOTE — ASSESSMENT & PLAN NOTE
Continue amlodipine and lisinopril.  Orders:    CBC and differential    Comprehensive metabolic panel    Urinalysis with microscopic

## 2025-07-21 NOTE — PROGRESS NOTES
Name: Ramona Webb      : 1940      MRN: [id number]  Encounter Provider: Phuc Sampson MD  Encounter Date: 2025   Encounter department: [facility] INTERNAL MEDICINE  :  Assessment & Plan  Rosacea         Essential hypertension  Continue amlodipine and lisinopril.  Orders:    CBC and differential    Comprehensive metabolic panel    Urinalysis with microscopic    Acquired hypothyroidism  Continue levothyroxine.  Orders:    TSH, 3rd generation    T4, free    Benign neoplasm of meninges, unspecified (HCC)  Following with neurology.       Impaired fasting blood sugar    Orders:    Hemoglobin A1C    Insomnia, unspecified type         Cognitive decline         Other specified intestinal malabsorption         Insomnia, unspecified type         Medicare annual wellness visit, subsequent            Preventive health issues were discussed with patient, and age appropriate screening tests were ordered as noted in patient's After Visit Summary. Personalized health advice and appropriate referrals for health education or preventive services given if needed, as noted in patient's After Visit Summary.    History of Present Illness     HPI   Patient Care Team:  Phuc Sampson MD as PCP - General (Internal Medicine)    Review of Systems   Constitutional:  Negative for appetite change, chills, fatigue and fever.   HENT:  Negative for sore throat and trouble swallowing.    Eyes:  Negative for redness.   Respiratory:  Negative for shortness of breath.    Cardiovascular:  Negative for chest pain and palpitations.   Gastrointestinal:  Negative for abdominal pain, constipation and diarrhea.   Genitourinary:  Negative for dysuria and hematuria.   Musculoskeletal:  Negative for back pain and neck pain.   Skin:  Negative for rash.   Neurological:  Negative for seizures, weakness and headaches.   Hematological:  Negative for adenopathy.   Psychiatric/Behavioral:  Negative for confusion. The patient is not  nervous/anxious.      Medical History Reviewed by provider this encounter:       Annual Wellness Visit Questionnaire   Ramona is here for her Subsequent Wellness visit. Last Medicare Wellness visit information reviewed, patient interviewed and updates made to the record today.      Health Risk Assessment:   Patient rates overall health as good. Patient feels that their physical health rating is same. Patient is satisfied with their life. Eyesight was rated as same. Hearing was rated as same. Patient feels that their emotional and mental health rating is same. Patients states they are sometimes angry. Patient states they are sometimes unusually tired/fatigued. Pain experienced in the last 7 days has been some. Patient's pain rating has been 3/10. Patient states that she has experienced no weight loss or gain in last 6 months.     Depression Screening:   PHQ-2 Score: 2      Fall Risk Screening:   In the past year, patient has experienced: no history of falling in past year      Urinary Incontinence Screening:   Patient has leaked urine accidently in the last six months.     Home Safety:  Patient does not have trouble with stairs inside or outside of their home. Patient has working smoke alarms and has working carbon monoxide detector. Home safety hazards include: none.     Nutrition:   Current diet is Regular.     Medications:   Patient is not currently taking any over-the-counter supplements. Patient is able to manage medications.     Activities of Daily Living (ADLs)/Instrumental Activities of Daily Living (IADLs):   Walk and transfer into and out of bed and chair?: No  Dress and groom yourself?: Yes    Bathe or shower yourself?: Yes    Feed yourself? Yes  Do your laundry/housekeeping?: Yes  Manage your money, pay your bills and track your expenses?: Yes  Make your own meals?: Yes    Do your own shopping?: Yes    Previous Hospitalizations:   Any hospitalizations or ED visits within the last 12 months?: No       Advance Care Planning:   Living will: Yes    Durable POA for healthcare: Yes    Advanced directive: Yes      Preventive Screenings      Cardiovascular Screening:    General: Screening Current      Diabetes Screening:     General: Screening Current      Breast Cancer Screening:     General: Screening Current      Cervical Cancer Screening:    General: Screening Not Indicated      Lung Cancer Screening:     General: Screening Not Indicated    Immunizations:  - Immunizations due: Prevnar 20    Screening, Brief Intervention, and Referral to Treatment (SBIRT)     Screening  Typical number of drinks in a day: 0  Typical number of drinks in a week: 0  Interpretation: Low risk drinking behavior.    Single Item Drug Screening:  How often have you used an illegal drug (including marijuana) or a prescription medication for non-medical reasons in the past year? never    Single Item Drug Screen Score: 0  Interpretation: Negative screen for possible drug use disorder    Other Counseling Topics:   Car/seat belt/driving safety, skin self-exam, sunscreen and calcium and vitamin D intake and regular weightbearing exercise.     Social Drivers of Health     Financial Resource Strain: Low Risk  (7/17/2023)    Overall Financial Resource Strain (CARDIA)     Difficulty of Paying Living Expenses: Not hard at all   Food Insecurity: No Food Insecurity (7/21/2025)    Nursing - Inadequate Food Risk Classification     Worried About Running Out of Food in the Last Year: Never true     Ran Out of Food in the Last Year: Never true   Transportation Needs: No Transportation Needs (7/21/2025)    PRAPARE - Transportation     Lack of Transportation (Medical): No     Lack of Transportation (Non-Medical): No   Housing Stability: Low Risk  (7/21/2025)    Housing Stability Vital Sign     Unable to Pay for Housing in the Last Year: No     Number of Times Moved in the Last Year: 0     Homeless in the Last Year: No   Utilities: Not At Risk (7/21/2025)     "[facility] Utilities     Threatened with loss of utilities: No     No results found.    Objective   /60 (BP Location: Left arm, Patient Position: Sitting, Cuff Size: Standard)   Pulse 72   Temp 98.2 °F (36.8 °C) (Temporal)   Ht 5' 2\" (1.575 m)   Wt 58.1 kg (128 lb)   SpO2 98%   BMI 23.41 kg/m²     Physical Exam  Vitals and nursing note reviewed.   Constitutional:       General: She is not in acute distress.     Appearance: She is well-developed.   HENT:      Head: Normocephalic and atraumatic.      Mouth/Throat:      Mouth: Mucous membranes are moist.     Eyes:      Conjunctiva/sclera: Conjunctivae normal.       Cardiovascular:      Rate and Rhythm: Normal rate and regular rhythm.      Heart sounds: No murmur heard.  Pulmonary:      Effort: Pulmonary effort is normal. No respiratory distress.      Breath sounds: Normal breath sounds.   Abdominal:      Palpations: Abdomen is soft.      Tenderness: There is no abdominal tenderness.     Musculoskeletal:         General: No swelling.      Cervical back: Normal range of motion and neck supple.     Skin:     General: Skin is warm and dry.      Capillary Refill: Capillary refill takes less than 2 seconds.     Neurological:      Mental Status: She is alert.     Psychiatric:         Mood and Affect: Mood normal.         "